# Patient Record
Sex: FEMALE | Race: WHITE | NOT HISPANIC OR LATINO | Employment: OTHER | URBAN - METROPOLITAN AREA
[De-identification: names, ages, dates, MRNs, and addresses within clinical notes are randomized per-mention and may not be internally consistent; named-entity substitution may affect disease eponyms.]

---

## 2017-05-09 ENCOUNTER — TRANSFERRED RECORDS (OUTPATIENT)
Dept: HEALTH INFORMATION MANAGEMENT | Facility: CLINIC | Age: 82
End: 2017-05-09

## 2017-06-06 ENCOUNTER — APPOINTMENT (OUTPATIENT)
Dept: OPHTHALMOLOGY | Facility: CLINIC | Age: 82
End: 2017-06-06
Attending: OPHTHALMOLOGY
Payer: MEDICARE

## 2017-06-06 DIAGNOSIS — H40.003 GLAUCOMA SUSPECT, BILATERAL: ICD-10-CM

## 2017-06-06 DIAGNOSIS — Z96.1 PSEUDOPHAKIA OF BOTH EYES: ICD-10-CM

## 2017-06-06 DIAGNOSIS — H40.1133 PRIMARY OPEN ANGLE GLAUCOMA OF BOTH EYES, SEVERE STAGE: Primary | ICD-10-CM

## 2017-06-06 PROCEDURE — 92020 GONIOSCOPY: CPT | Mod: ZF | Performed by: OPHTHALMOLOGY

## 2017-06-06 PROCEDURE — 92083 EXTENDED VISUAL FIELD XM: CPT | Mod: ZF | Performed by: OPHTHALMOLOGY

## 2017-06-06 PROCEDURE — 92133 CPTRZD OPH DX IMG PST SGM ON: CPT | Mod: ZF | Performed by: OPHTHALMOLOGY

## 2017-06-06 PROCEDURE — 99215 OFFICE O/P EST HI 40 MIN: CPT | Mod: ZF

## 2017-06-06 RX ORDER — DORZOLAMIDE HCL 20 MG/ML
SOLUTION/ DROPS OPHTHALMIC
Refills: 10 | COMMUNITY
Start: 2017-04-13 | End: 2020-01-06

## 2017-06-06 RX ORDER — LISINOPRIL 10 MG/1
10 TABLET ORAL
COMMUNITY
Start: 2016-08-15 | End: 2020-01-06

## 2017-06-06 RX ORDER — METOPROLOL SUCCINATE 25 MG/1
25 TABLET, EXTENDED RELEASE ORAL DAILY
COMMUNITY
Start: 2016-08-15 | End: 2020-03-23

## 2017-06-06 RX ORDER — BRINZOLAMIDE 10 MG/ML
SUSPENSION/ DROPS OPHTHALMIC
Refills: 2 | COMMUNITY
Start: 2017-03-13 | End: 2017-10-17

## 2017-06-06 RX ORDER — TRAZODONE HYDROCHLORIDE 50 MG/1
150 TABLET, FILM COATED ORAL AT BEDTIME
COMMUNITY
Start: 2017-02-21 | End: 2020-02-26

## 2017-06-06 RX ORDER — TRAVOPROST OPHTHALMIC SOLUTION 0.04 MG/ML
1 SOLUTION OPHTHALMIC AT BEDTIME
COMMUNITY
Start: 2016-04-12 | End: 2020-04-21

## 2017-06-06 RX ORDER — ALENDRONATE SODIUM 70 MG/1
70 TABLET ORAL
COMMUNITY
Start: 2017-04-13 | End: 2020-02-11

## 2017-06-06 RX ORDER — LEVOTHYROXINE SODIUM 88 UG/1
88 TABLET ORAL
COMMUNITY
Start: 2017-05-18 | End: 2017-06-06

## 2017-06-06 ASSESSMENT — PACHYMETRY
OS_CT(UM): 495
OD_CT(UM): 519

## 2017-06-06 ASSESSMENT — VISUAL ACUITY
OS_SC: 20/40-2
OD_PH_SC: 20/40
OD_SC: 20/70-1
METHOD: SNELLEN - LINEAR

## 2017-06-06 ASSESSMENT — GONIOSCOPY
OS_TEMPORAL: 4
OS_INFERIOR: 4
OD_TEMPORAL: 4
OS_NASAL: 4
ADDITIONAL_COMMENTS: 1-2+ TMP OU
OD_INFERIOR: 4
OS_SUPERIOR: 4
OD_NASAL: 4
OD_SUPERIOR: 4

## 2017-06-06 ASSESSMENT — SLIT LAMP EXAM - LIDS
COMMENTS: NORMAL
COMMENTS: NORMAL

## 2017-06-06 ASSESSMENT — TONOMETRY
IOP_METHOD: APPLANATION
OD_IOP_MMHG: 12
OS_IOP_MMHG: 19
OD_IOP_MMHG: 15
IOP_METHOD: APPLANATION
OS_IOP_MMHG: 22

## 2017-06-06 ASSESSMENT — REFRACTION_MANIFEST
OD_AXIS: 015
OD_ADD: +2.75
OS_AXIS: 015
OS_ADD: +2.75
OD_SPHERE: -1.50
OS_CYLINDER: +0.25
OD_CYLINDER: +0.75
OS_SPHERE: -0.50

## 2017-06-06 ASSESSMENT — CONF VISUAL FIELD
OD_INFERIOR_NASAL_RESTRICTION: 3
OS_INFERIOR_TEMPORAL_RESTRICTION: 3
OS_INFERIOR_NASAL_RESTRICTION: 1
OS_SUPERIOR_TEMPORAL_RESTRICTION: 3
OS_SUPERIOR_NASAL_RESTRICTION: 1
OD_SUPERIOR_TEMPORAL_RESTRICTION: 3
OD_INFERIOR_TEMPORAL_RESTRICTION: 3
OD_SUPERIOR_NASAL_RESTRICTION: 3

## 2017-06-06 ASSESSMENT — EXTERNAL EXAM - RIGHT EYE: OD_EXAM: NORMAL

## 2017-06-06 ASSESSMENT — CUP TO DISC RATIO
OD_RATIO: 0.6
OS_RATIO: 0.9

## 2017-06-06 ASSESSMENT — EXTERNAL EXAM - LEFT EYE: OS_EXAM: NORMAL

## 2017-06-06 NOTE — LETTER
June 6, 2017      Martín Jones MD  [facility]  [address] [place] [address]       RE: Annabel May  [address]  [facility] [address]       Dear [place]:     Thank you for referring your patient, Annabel May, to the EYE CLINIC at [facility]. Please see a copy of my visit note below.    Assessment:  1)POAG/?LTG OS>>OD -- ?H/O Hypotension and Bradycardia per son  K pachy: 519/495    Tmax: teens/24  HVF: OD:?Sup arcuate and OS:Sup hemifield with inferior arcuate and dec MD ?[place]  CDR: 0.6/0.9  HRT/OCT: OD:Mod-Severe RNFl thinning and OS:Severe RNFL thinning    FHX of Glc: No  Gonio: open  Intolerant to: ?Cosopt -- drop was d/c 2/2 irritation per son (?T1/2 as patient is tolerating Azopt)  Asthma/COPD: No, on po BB  Steroid Use: No  Kidney Stones: No    Sulfa Allergy: No  IOP targets: -- IOP above target OS   2)PCIOL OU -- following with Dr. Jones  3)H/O Afib on Eliquis s/p Pacemaker -- Brother (Kelvin De La Torre) who is a radiologist is POA -- [phone]    MD:Assisted living facility administers drops                        Plan:   Will obtain old notes and visual field tests.  Patient will return to clinic in 2-3 months with repeat visual field test (OS:LVC only) and IOP check.  Patient will continue on Travatan which is a teal top drop at bedtime in both eyes.  Patient will discontinue on Azopt (brinzolamide) which is an orange top drop and start Simbrinza (Brinzolamide/Brimonidine) which is a white top drop 3x/day (8 hours apart) in both eyes.  Pending results and review of old notes consideration will be given to possible surgery in the left eye.      Again, thank you for allowing me to participate in the care of your patient.        Sincerely,      Gisele Castro MD

## 2017-06-06 NOTE — NURSING NOTE
Chief Complaints and History of Present Illnesses   Patient presents with     Glaucoma Evaluation     HPI    Affected eye(s):  Left   Symptoms:     Blurred vision   Decreased vision   Dryness         Do you have eye pain now?:  No      Comments:  LE VA slowly getting worse. Pt has with azopt, dorzolamide and travatan   Beverly PANDYA June 6, 2017 9:32 AM

## 2017-06-06 NOTE — MR AVS SNAPSHOT
After Visit Summary   6/6/2017    Annabel May    MRN: [id number]           Patient Information     Date Of Birth          5/12/1934        Visit Information        Provider Department      6/6/2017 9:00 AM Gisele Castro MD Eye Clinic        Today's Diagnoses     Glaucoma suspect, bilateral    -  1      Care Instructions    Will obtain old notes and visual field tests.  Patient will return to clinic in 2-3 months with repeat visual field test (OS:LVC only) and IOP check.  Patient will continue on Travatan which is a teal top drop at bedtime in both eyes.  Patient will discontinue on Azopt (brinzolamide) which is an orange top drop and start Simbrinza (Brinzolamide/Brimonidine) which is a white top drop 3x/day (8 hours apart) in both eyes.            Follow-ups after your visit        Follow-up notes from your care team     Return 2-3 months with repeat visual field test (OS:LVC only) and IOP check.      Who to contact     Please call your clinic at [phone] to:    Ask questions about your health    Make or cancel appointments    Discuss your medicines    Learn about your test results    Speak to your doctor   If you have compliments or concerns about an experience at your clinic, or if you wish to file a complaint, please contact [facility] Patient Relations at [phone] or email us at [email or web address]         Additional Information About Your Visit        MyChart Information     DEMANDIT is an electronic gateway that provides easy, online access to your medical records. With DEMANDIT, you can request a clinic appointment, read your test results, renew a prescription or communicate with your care team.     To sign up for BTIGt visit the website at [email or web address]   You will be asked to enter the access code listed below, as well as some personal information. Please follow the directions to create your username and password.      Your access code is: [email or web address]  Expires: 2017  6:30 AM     Your access code will  in 90 days. If you need help or a new code, please contact your [facility] or call [phone] for assistance.        Care EveryWhere ID     This is your Care EveryWhere ID. This could be used by other organizations to access your [place] medical records  [id number]         Blood Pressure from Last 3 Encounters:   10/30/14 138/80   12 134/85   12 109/77    Weight from Last 3 Encounters:   10/30/14 59.9 kg (132 lb)   12 64.4 kg (142 lb)   12 65.8 kg (145 lb)              We Performed the Following     OCT Optic Nerve RNFL Spectralis OU (both eyes)     OVF 24-2 Dynamic OU          Today's Medication Changes          These changes are accurate as of: 17 11:35 AM.  If you have any questions, ask your nurse or doctor.               These medicines have changed or have updated prescriptions.        Dose/Directions    levothyroxine 100 MCG tablet   Commonly known as:  SYNTHROID   Indication:  Underactive Thyroid   This may have changed:  Another medication with the same name was removed. Continue taking this medication, and follow the directions you see here.   Used for:  Hypothyroidism   Changed by:  Mynor Alvarez MD        Dose:  100 mcg   Take 1 tablet by mouth daily. Indications: Underactive Thyroid   Quantity:  90 tablet   Refills:  3                Primary Care Provider Office Phone # Fax #    Mynor Alvarez [phone]       [address] [address]        Thank you!     Thank you for choosing EYE CLINIC  for your care. Our goal is always to provide you with excellent care. Hearing back from our patients is one way we can continue to improve our services. Please take a few minutes to complete the written survey that you may receive in the mail after your visit with us. Thank you!             Your  Updated Medication List - Protect others around you: Learn how to safely use, store and throw away your medicines at www.disposemymeds.org.          This list is accurate as of: 6/6/17 11:35 AM.  Always use your most recent med list.                   Brand Name Dispense Instructions for use    alendronate 70 MG tablet    FOSAMAX     Take 70 mg by mouth       apixaban ANTICOAGULANT 2.5 MG tablet    ELIQUIS     Take 2.5 mg by mouth       aspirin 325 MG tablet      Take 325 mg by mouth daily.       AZOPT 1 % ophthalmic susp   Generic drug:  brinzolamide      INSTILL 1 GTT IN LEFT EAR BID       butalbital-aspirin-caffeine -40 MG per capsule    FIORINAL     Take 1 capsule by mouth 3 times daily as needed.       dorzolamide 2 % ophthalmic solution    TRUSOPT     INT ONE GTT KELLEE BID       levothyroxine 100 MCG tablet    SYNTHROID    90 tablet    Take 1 tablet by mouth daily. Indications: Underactive Thyroid       lisinopril 10 MG tablet    PRINIVIL/ZESTRIL     Take 10 mg by mouth       metoprolol 25 MG 24 hr tablet    TOPROL-XL     Take 25 mg by mouth       TRAVATAN Z 0.004 % ophthalmic solution   Generic drug:  travoprost (BAK Free)      1 drop       traZODone 50 MG tablet    DESYREL     Take 50 mg by mouth

## 2017-06-06 NOTE — PROGRESS NOTES
1)POAG/?LTG OS>>OD -- ?H/O Hypotension and Bradycardia per son -- K pachy:519/495    Tmax: teens/24    HVF: OD:?Sup arcuate and OS:Sup hemifield with inferior arcuate and dec MD ?[place]      CDR:0.6/0.9     HRT/OCT: OD:Mod-Severe RNFl thinning and OS:Severe RNFL thinning       FHX of Glc: No     Gonio: open        Intolerant to: ?Cosopt -- drop was d/c 2/2 irritation per son (?T1/2 as patient is tolerating Azopt)    Asthma/COPD:No, on po BB   Steroid Use: No     Kidney Stones: No     Sulfa Allergy: No     IOP targets: -- IOP above target OS   2)PCIOL OU -- following with Dr. Jones  3)H/O Afib on Eliquis s/p Pacemaker -- Brother (Kelvin De La Torre) who is a radiologist is POA -- [phone]    MD:Assisted living facility administers drops    Will obtain old notes and visual field tests.  Patient will return to clinic in 2-3 months with repeat visual field test (OS:LVC only) and IOP check.  Patient will continue on Travatan which is a teal top drop at bedtime in both eyes.  Patient will discontinue on Azopt (brinzolamide) which is an orange top drop and start Simbrinza (Brinzolamide/Brimonidine) which is a white top drop 3x/day (8 hours apart) in both eyes.  Pending results and review of old notes consideration will be given to possible surgery in the left eye.      Attending Physician Attestation:  Complete documentation of historical and exam elements from today's encounter can be found in the full encounter summary report (not reduplicated in this progress note). I personally obtained the chief complaint(s) and history of present illness.  I confirmed and edited as necessary the review of systems, past medical/surgical history, family history, social history, and examination findings as documented by others; and I examined the patient myself. I personally reviewed the relevant tests, images, and reports as documented above. I formulated and edited as necessary the assessment and plan and discussed the findings  and management plan with the patient and family.  - Gisele Castro MD

## 2017-06-06 NOTE — PATIENT INSTRUCTIONS
Will obtain old notes and visual field tests.  Patient will return to clinic in 2-3 months with repeat visual field test (OS:LVC only) and IOP check.  Patient will continue on Travatan which is a teal top drop at bedtime in both eyes.  Patient will discontinue on Azopt (brinzolamide) which is an orange top drop and start Simbrinza (Brinzolamide/Brimonidine) which is a white top drop 3x/day (8 hours apart) in both eyes.

## 2017-06-15 ENCOUNTER — TELEPHONE (OUTPATIENT)
Dept: PSYCHIATRY | Facility: CLINIC | Age: 82
End: 2017-06-15

## 2017-06-15 DIAGNOSIS — H40.1130 PRIMARY OPEN ANGLE GLAUCOMA OF BOTH EYES, UNSPECIFIED GLAUCOMA STAGE: Primary | ICD-10-CM

## 2017-06-15 NOTE — TELEPHONE ENCOUNTER
Call from patients care giver concerned that the Rx for Simbrinza (Brinzolamide/Brimonidine) was sent to the wrong pharmacy. Medication list reviewed and there is no order for this medication. Chart note from 6-6-17 states: start Simbrinza (Brinzolamide/Brimonidine) which is a white top drop 3x/day (8 hours apart) in both eyes.  However the note is unsigned. The medication will pended and routed to the provider refill team for review and authorization. Once signed it will go to the requested pharmacy.      Kathleen M Doege RN

## 2017-08-24 ENCOUNTER — APPOINTMENT (OUTPATIENT)
Dept: OPHTHALMOLOGY | Facility: CLINIC | Age: 82
End: 2017-08-24
Attending: OPHTHALMOLOGY
Payer: MEDICARE

## 2017-08-24 DIAGNOSIS — Z96.1 PSEUDOPHAKIA OF BOTH EYES: ICD-10-CM

## 2017-08-24 DIAGNOSIS — H40.1133 PRIMARY OPEN ANGLE GLAUCOMA OF BOTH EYES, SEVERE STAGE: Primary | ICD-10-CM

## 2017-08-24 PROCEDURE — 92083 EXTENDED VISUAL FIELD XM: CPT | Mod: ZF | Performed by: OPHTHALMOLOGY

## 2017-08-24 PROCEDURE — 99212 OFFICE O/P EST SF 10 MIN: CPT | Mod: 25,ZF

## 2017-08-24 ASSESSMENT — VISUAL ACUITY
METHOD: SNELLEN - LINEAR
OS_CC: 20/25
OD_CC: 20/20
OD_CC+: -2
CORRECTION_TYPE: GLASSES
OS_CC+: -2

## 2017-08-24 ASSESSMENT — EXTERNAL EXAM - RIGHT EYE: OD_EXAM: NORMAL

## 2017-08-24 ASSESSMENT — SLIT LAMP EXAM - LIDS
COMMENTS: NORMAL
COMMENTS: NORMAL

## 2017-08-24 ASSESSMENT — CUP TO DISC RATIO
OD_RATIO: 0.6
OS_RATIO: 0.9

## 2017-08-24 ASSESSMENT — CONF VISUAL FIELD
OD_NORMAL: 1
OS_SUPERIOR_TEMPORAL_RESTRICTION: 3
OS_INFERIOR_TEMPORAL_RESTRICTION: 3
OS_INFERIOR_NASAL_RESTRICTION: 1
OS_SUPERIOR_NASAL_RESTRICTION: 3

## 2017-08-24 ASSESSMENT — TONOMETRY
OS_IOP_MMHG: 29
OD_IOP_MMHG: 15
IOP_METHOD: APPLANATION

## 2017-08-24 ASSESSMENT — REFRACTION_WEARINGRX
OD_CYLINDER: +0.75
OD_SPHERE: -1.50
OS_ADD: +2.75
OS_SPHERE: -0.50
OS_AXIS: 015
OD_ADD: +2.75
OD_AXIS: 015
OS_CYLINDER: +0.25

## 2017-08-24 ASSESSMENT — EXTERNAL EXAM - LEFT EYE: OS_EXAM: NORMAL

## 2017-08-24 NOTE — MR AVS SNAPSHOT
After Visit Summary   8/24/2017    Annabel May    MRN: [id number]           Patient Information     Date Of Birth          5/12/1934        Visit Information        Provider Department      8/24/2017 3:00 PM Gisele Castro MD Eye Clinic        Today's Diagnoses     Primary open angle glaucoma of both eyes, severe stage    -  1    Pseudophakia of both eyes          Care Instructions    Will obtain old notes and visual field tests.  Patient will continue on Travatan which is a teal top drop at bedtime in BOTH EYES and Azopt (brinzolamide) which is an orange top drop 3x/day (8 hours apart) in BOTH EYES.  A long discussion of the risks, benefits, and alternatives including potential treatment and management options were had with patient and a decision was made to proceed with possible Trabeculectomy (glaucoma surgery) in the left eye.              Follow-ups after your visit        Who to contact     Please call your clinic at [phone] to:    Ask questions about your health    Make or cancel appointments    Discuss your medicines    Learn about your test results    Speak to your doctor   If you have compliments or concerns about an experience at your clinic, or if you wish to file a complaint, please contact [facility] Patient Relations at [phone] or email us at [email or web address]         Additional Information About Your Visit        MyChart Information     HexAirbot is an electronic gateway that provides easy, online access to your medical records. With HexAirbot, you can request a clinic appointment, read your test results, renew a prescription or communicate with your care team.     To sign up for ConnectSolutionst visit the website at [email or web address]   You will be asked to enter the access code listed below, as well as some personal information. Please follow the directions to create your username and password.     Your access code is:  [email or web address]  Expires: 2017  4:29 PM     Your access code will  in 90 days. If you need help or a new code, please contact your [facility] or call [phone] for assistance.        Care EveryWhere ID     This is your Care EveryWhere ID. This could be used by other organizations to access your [place] medical records  [id number]         Blood Pressure from Last 3 Encounters:   10/30/14 138/80   12 134/85   12 109/77    Weight from Last 3 Encounters:   10/30/14 59.9 kg (132 lb)   12 64.4 kg (142 lb)   12 65.8 kg (145 lb)              We Performed the Following     Low Vision Central OS        Primary Care Provider Office Phone # Fax #    Mynor Alvarez [phone]       [place] [address]        Equal Access to Services     ILIA [facility] CenterLIZETTE : Hadii aad ku hadasho Soomaali, waaxda luqadaha, qaybta kaalmada adeegyada, waxay idiin hayaan adeeg kharash la'jrn . So [facility] [phone].    ATENCIÓN: Si habla español, tiene a murillo disposición servicios gratuitos de asistencia lingüística. Llame al [phone].    We comply with applicable federal civil rights laws and [place] laws. We do not discriminate on the basis of race, color, national origin, age, disability sex, sexual orientation or gender identity.            Thank you!     Thank you for choosing EYE CLINIC  for your care. Our goal is always to provide you with excellent care. Hearing back from our patients is one way we can continue to improve our services. Please take a few minutes to complete the written survey that you may receive in the mail after your visit with us. Thank you!             Your Updated Medication List - Protect others around you: Learn how to safely use, store and throw away your medicines at www.disposemymeds.org.          This list is accurate as of: 17  4:30 PM.  Always use your most recent med list.                    Brand Name Dispense Instructions for use Diagnosis    alendronate 70 MG tablet    FOSAMAX     Take 70 mg by mouth        apixaban ANTICOAGULANT 2.5 MG tablet    ELIQUIS     Take 2.5 mg by mouth        aspirin 325 MG tablet      Take 325 mg by mouth daily.        AZOPT 1 % ophthalmic susp   Generic drug:  brinzolamide      INSTILL 1 GTT IN LEFT EAR BID        brinzolamide-brimonidine 1-0.2 % ophthalmic suspension    SIMBRINZA    8 mL    Place 1 drop into both eyes 3 times daily 8 hours apart    Primary open angle glaucoma of both eyes, unspecified glaucoma stage       butalbital-aspirin-caffeine -40 MG per capsule    FIORINAL     Take 1 capsule by mouth 3 times daily as needed.        dorzolamide 2 % ophthalmic solution    TRUSOPT     INT ONE GTT KELLEE BID        levothyroxine 100 MCG tablet    SYNTHROID    90 tablet    Take 1 tablet by mouth daily. Indications: Underactive Thyroid    Hypothyroidism       lisinopril 10 MG tablet    PRINIVIL/ZESTRIL     Take 10 mg by mouth        metoprolol 25 MG 24 hr tablet    TOPROL-XL     Take 25 mg by mouth        TRAVATAN Z 0.004 % ophthalmic solution   Generic drug:  travoprost (BAK Free)      1 drop        traZODone 50 MG tablet    DESYREL     Take 50 mg by mouth

## 2017-08-24 NOTE — NURSING NOTE
Chief Complaints and History of Present Illnesses   Patient presents with     Follow Up For     Primary open angle glaucoma of both eyes, severe stage     HPI    Affected eye(s):  Both   Symptoms:        Duration:  2 months   Frequency:  Constant       Do you have eye pain now?:  No      Comments:  Pt. States no change in VA BE.  No c/o comfort BE.  Roxy Rios COT 2:42 PM August 24, 2017

## 2017-08-24 NOTE — PATIENT INSTRUCTIONS
Will obtain old notes and visual field tests.  Patient will continue on Travatan which is a teal top drop at bedtime in BOTH EYES and Azopt (brinzolamide) which is an orange top drop 3x/day (8 hours apart) in BOTH EYES.  A long discussion of the risks, benefits, and alternatives including potential treatment and management options were had with patient and a decision was made to proceed with possible Trabeculectomy (glaucoma surgery) in the left eye.

## 2017-08-24 NOTE — PROGRESS NOTES
1)POAG/?LTG OS>>OD -- ?H/O Hypotension and Bradycardia per son -- K pachy:519/495    Tmax: teens/28    HVF: OD:?Sup arcuate and OS:Sup hemifield with inferior arcuate and dec MD ?[place]      CDR:0.6/0.9     HRT/OCT: OD:Mod-Severe RNFl thinning and OS:Severe RNFL thinning       FHX of Glc: No     Gonio: open        Intolerant to: ?Cosopt -- drop was d/c 2/2 irritation per son (?T1/2 as patient is tolerating Azopt), Simbrinza -- irritation, injection all day  (d/c 8/17)   Asthma/COPD:No, on po BB   Steroid Use: No     Kidney Stones: No     Sulfa Allergy: No     IOP targets: -- IOP above target OS -- rec Trab, mCPC, Tube OS  2)PCIOL OU -- following with Dr. Jones  3)H/O Afib on Eliquis s/p Pacemaker -- (Kelvin De La Torre) who is a radiologist is POA -- [phone], Caregiver (Lou) [phone]    MD:Assisted living facility administers drops    Will obtain old notes and visual field tests.  Patient will continue on Travatan which is a teal top drop at bedtime in BOTH EYES and Azopt (brinzolamide) which is an orange top drop 3x/day (8 hours apart) in BOTH EYES.  A long discussion of the risks, benefits, and alternatives including potential treatment and management options were had with patient and a decision was made to proceed with possible Trabeculectomy (glaucoma surgery) in the left eye after discussing with her son.      8/19/17: Phone dIscussion with Kelvin (POA) -- would proceed with tube given follow up issues and declining misha.  WIll have aptient scheduled for tube surgery      Attending Physician Attestation:  Complete documentation of historical and exam elements from today's encounter can be found in the full encounter summary report (not reduplicated in this progress note). I personally obtained the chief complaint(s) and history of present illness.  I confirmed and edited as necessary the review of systems, past medical/surgical history, family history, social history, and examination findings as  documented by others; and I examined the patient myself. I personally reviewed the relevant tests, images, and reports as documented above. I formulated and edited as necessary the assessment and plan and discussed the findings and management plan with the patient and family.  - Gisele Castro MD

## 2017-09-01 ENCOUNTER — TELEPHONE (OUTPATIENT)
Dept: OPHTHALMOLOGY | Facility: CLINIC | Age: 82
End: 2017-09-01

## 2017-09-18 ENCOUNTER — TELEPHONE (OUTPATIENT)
Dept: OPHTHALMOLOGY | Facility: CLINIC | Age: 82
End: 2017-09-18

## 2017-10-12 ENCOUNTER — TRANSFERRED RECORDS (OUTPATIENT)
Dept: HEALTH INFORMATION MANAGEMENT | Facility: CLINIC | Age: 82
End: 2017-10-12

## 2017-10-16 ENCOUNTER — TRANSFERRED RECORDS (OUTPATIENT)
Dept: HEALTH INFORMATION MANAGEMENT | Facility: CLINIC | Age: 82
End: 2017-10-16

## 2017-10-17 ENCOUNTER — OFFICE VISIT (OUTPATIENT)
Dept: OPHTHALMOLOGY | Facility: CLINIC | Age: 82
End: 2017-10-17
Attending: OPHTHALMOLOGY
Payer: MEDICARE

## 2017-10-17 DIAGNOSIS — H40.1133 PRIMARY OPEN ANGLE GLAUCOMA OF BOTH EYES, SEVERE STAGE: Primary | ICD-10-CM

## 2017-10-17 DIAGNOSIS — Z96.1 PSEUDOPHAKIA OF BOTH EYES: ICD-10-CM

## 2017-10-17 PROCEDURE — 99213 OFFICE O/P EST LOW 20 MIN: CPT | Mod: ZF

## 2017-10-17 RX ORDER — BRINZOLAMIDE 10 MG/ML
1 SUSPENSION/ DROPS OPHTHALMIC 2 TIMES DAILY
COMMUNITY
End: 2020-04-29

## 2017-10-17 RX ORDER — CIPROFLOXACIN HYDROCHLORIDE 3.5 MG/ML
1 SOLUTION/ DROPS TOPICAL 4 TIMES DAILY
Qty: 1 BOTTLE | Refills: 0 | Status: SHIPPED | OUTPATIENT
Start: 2017-10-17 | End: 2020-01-06

## 2017-10-17 RX ORDER — PREDNISOLONE ACETATE 10 MG/ML
1 SUSPENSION/ DROPS OPHTHALMIC
Qty: 15 ML | Refills: 3 | Status: SHIPPED | OUTPATIENT
Start: 2017-10-17 | End: 2020-01-06

## 2017-10-17 ASSESSMENT — EXTERNAL EXAM - RIGHT EYE: OD_EXAM: NORMAL

## 2017-10-17 ASSESSMENT — REFRACTION_WEARINGRX
OS_AXIS: 015
OS_SPHERE: -0.50
OD_CYLINDER: +0.75
OD_ADD: +2.75
OD_SPHERE: -1.50
OS_CYLINDER: +0.25
OD_AXIS: 015
OS_ADD: +2.75

## 2017-10-17 ASSESSMENT — SLIT LAMP EXAM - LIDS
COMMENTS: NORMAL
COMMENTS: NORMAL

## 2017-10-17 ASSESSMENT — VISUAL ACUITY
OD_CC: 20/40
METHOD: SNELLEN - LINEAR
OS_SC: 20/125
OD_CC+: -2

## 2017-10-17 ASSESSMENT — TONOMETRY
IOP_METHOD: TONOPEN
OS_IOP_MMHG: 09
OD_IOP_MMHG: 14

## 2017-10-17 ASSESSMENT — EXTERNAL EXAM - LEFT EYE: OS_EXAM: NORMAL

## 2017-10-17 NOTE — NURSING NOTE
Chief Complaints and History of Present Illnesses   Patient presents with     Post Op (Ophthalmology) Left Eye     HPI    Symptoms:           Do you have eye pain now?:  Yes   Location:  OS   Pain Level:  Moderate Pain (4)   Pain Frequency:  Constant      Comments:  One day POP left eye.    MUMTAZ Larsen 8:01 AM 10/17/2017

## 2017-10-17 NOTE — PATIENT INSTRUCTIONS
Patient will continue on Travatan which is a teal top drop at bedtime in the RIGHT EYE ONLY and Azopt (brinzolamide) which is an orange top drop 3x/day (8 hours apart) in the RIGHT EYE ONLY.      No heavy lifting, bending, stooping, straining, rubbing the eye, or getting water in the eye.  Please wear protective eyewear over the eye at all times including glasses during the day and the protective shield at bedtime.  Patient will return to clinic in 1 week with repeat evaluation.    Use the following drops (LEFT EYE ONLY):  Antibiotic --  Ciprofloxacin: 4x/day until finished (use for at least 5-7 days after surgery)    Steroid -- Pred Forte/Prednisolone Acetate: every 2 hours while awake    Please be sure to call if you have any decrease in vision, increase in pain, flashing lights, redness, or a lot of drainage.

## 2017-10-17 NOTE — MR AVS SNAPSHOT
After Visit Summary   10/17/2017    Annabel May    MRN: [id number]           Patient Information     Date Of Birth          5/12/1934        Visit Information        Provider Department      10/17/2017 7:45 AM Gisele Castro MD [facility]        Today's Diagnoses     Primary open angle glaucoma of both eyes, severe stage    -  1    Pseudophakia of both eyes          Care Instructions    Patient will continue on Travatan which is a teal top drop at bedtime in the RIGHT EYE ONLY and Azopt (brinzolamide) which is an orange top drop 3x/day (8 hours apart) in the RIGHT EYE ONLY.      No heavy lifting, bending, stooping, straining, rubbing the eye, or getting water in the eye.  Please wear protective eyewear over the eye at all times including glasses during the day and the protective shield at bedtime.  Patient will return to clinic in 1 week with repeat evaluation.    Use the following drops (LEFT EYE ONLY):  Antibiotic --  Ciprofloxacin: 4x/day until finished (use for at least 5-7 days after surgery)    Steroid -- Pred Forte/Prednisolone Acetate: every 2 hours while awake    Please be sure to call if you have any decrease in vision, increase in pain, flashing lights, redness, or a lot of drainage.              Follow-ups after your visit        Your next 10 appointments already scheduled     Oct 24, 2017  2:45 PM CDT   Post-Op with Gisele Castro MD   Eye Clinic ([facility])    [facility]  [address]  [address]  [facility] [facility] [address]   [phone]              Who to contact     Please call your clinic at [phone] to:    Ask questions about your health    Make or cancel appointments    Discuss your medicines    Learn about your test results    Speak to your doctor   If you have compliments or concerns about an experience at your clinic, or if you wish to file a complaint, please contact [facility] Patient Relations at  [phone] or email us at [email or web address]         Additional Information About Your Visit        WorkHound Information     WorkHound is an electronic gateway that provides easy, online access to your medical records. With WorkHound, you can request a clinic appointment, read your test results, renew a prescription or communicate with your care team.     To sign up for WorkHound visit the website at [email or web address]   You will be asked to enter the access code listed below, as well as some personal information. Please follow the directions to create your username and password.     Your access code is: [email or web address]  Expires: 2017  4:29 PM     Your access code will  in 90 days. If you need help or a new code, please contact your [facility] or call [phone] for assistance.        Care EveryWhere ID     This is your Care EveryWhere ID. This could be used by other organizations to access your [facility] medical records  [id number]         Blood Pressure from Last 3 Encounters:   10/30/14 138/80   12 134/85   12 109/77    Weight from Last 3 Encounters:   10/30/14 59.9 kg (132 lb)   12 64.4 kg (142 lb)   12 65.8 kg (145 lb)              Today, you had the following     No orders found for display         Today's Medication Changes          These changes are accurate as of: 10/17/17  8:33 AM.  If you have any questions, ask your nurse or doctor.               Start taking these medicines.        Dose/Directions    ciprofloxacin 0.3 % ophthalmic solution   Commonly known as:  CILOXAN   Used for:  Primary open angle glaucoma of both eyes, severe stage   Started by:  Gisele Castro MD        Dose:  1 drop   Place 1 drop Into the left eye 4 times daily   Quantity:  1 Bottle   Refills:  0       prednisoLONE acetate 1 % ophthalmic susp   Commonly known as:  PRED FORTE   Used for:  Primary open angle glaucoma of both eyes, severe  stage   Started by:  Gisele Castro MD        Dose:  1 drop   Place 1 drop Into the left eye every 2 hours   Quantity:  15 mL   Refills:  3         These medicines have changed or have updated prescriptions.        Dose/Directions    brinzolamide 1 % ophthalmic susp   Commonly known as:  AZOPT   This may have changed:  Another medication with the same name was removed. Continue taking this medication, and follow the directions you see here.   Changed by:  Gisele Castro MD        Dose:  1 drop   Place 1 drop into the right eye 3 times daily   Refills:  0            Where to get your medicines      These medications were sent to [facility] [address] [facility], [place] - [address] [facility] [address] [place] [address]     Phone:  [phone]     ciprofloxacin 0.3 % ophthalmic solution    prednisoLONE acetate 1 % ophthalmic susp                Primary Care Provider Office Phone # Fax #    Mynor Alvarez [phone]       [address] [address]        Equal Access to Services     [facility]: Hadii aad ku hadasho Soomaali, waaxda luqadaha, qaybta kaalmada adeegyada, waxashtyn russ . So [facility] [phone].    ATENCIÓN: Si habla español, tiene a murillo disposición servicios gratmichaelaos de asistencia lingüística. [facility] [phone].    We comply with applicable federal civil rights laws and [place] laws. We do not discriminate on the basis of race, color, national origin, age, disability, sex, sexual orientation, or gender identity.            Thank you!     Thank you for choosing EYE CLINIC  for your care. Our goal is always to provide you with excellent care. Hearing back from our patients is one way we can continue to improve our services. Please take a few minutes to complete the written survey that you may receive in the mail after your visit with us. Thank you!             Your  Updated Medication List - Protect others around you: Learn how to safely use, store and throw away your medicines at www.disposemymeds.org.          This list is accurate as of: 10/17/17  8:33 AM.  Always use your most recent med list.                   Brand Name Dispense Instructions for use Diagnosis    alendronate 70 MG tablet    FOSAMAX     Take 70 mg by mouth        apixaban ANTICOAGULANT 2.5 MG tablet    ELIQUIS     Take 2.5 mg by mouth        aspirin 325 MG tablet      Take 325 mg by mouth daily.        brinzolamide 1 % ophthalmic susp    AZOPT     Place 1 drop into the right eye 3 times daily        brinzolamide-brimonidine 1-0.2 % ophthalmic suspension    SIMBRINZA    8 mL    Place 1 drop into both eyes 3 times daily 8 hours apart    Primary open angle glaucoma of both eyes, unspecified glaucoma stage       butalbital-aspirin-caffeine -40 MG per capsule    FIORINAL     Take 1 capsule by mouth 3 times daily as needed.        ciprofloxacin 0.3 % ophthalmic solution    CILOXAN    1 Bottle    Place 1 drop Into the left eye 4 times daily    Primary open angle glaucoma of both eyes, severe stage       dorzolamide 2 % ophthalmic solution    TRUSOPT     INT ONE GTT KELLEE BID        levothyroxine 100 MCG tablet    SYNTHROID    90 tablet    Take 1 tablet by mouth daily. Indications: Underactive Thyroid    Hypothyroidism       lisinopril 10 MG tablet    PRINIVIL/ZESTRIL     Take 10 mg by mouth        metoprolol 25 MG 24 hr tablet    TOPROL-XL     Take 25 mg by mouth        prednisoLONE acetate 1 % ophthalmic susp    PRED FORTE    15 mL    Place 1 drop Into the left eye every 2 hours    Primary open angle glaucoma of both eyes, severe stage       TRAVATAN Z 0.004 % ophthalmic solution   Generic drug:  travoprost (BAK Free)      Place 1 drop into the right eye At Bedtime        traZODone 50 MG tablet    DESYREL     Take 50 mg by mouth

## 2017-10-17 NOTE — PROGRESS NOTES
1)POAG/?LTG OS>>OD -- s/p Tube OS (10/16/17), ?H/O Hypotension and Bradycardia per son -- K pachy:519/495    Tmax: teens/28    HVF: OD:?Sup arcuate and OS:Sup hemifield with inferior arcuate and dec MD ?[place]      CDR:0.6/0.9     HRT/OCT: OD:Mod-Severe RNFl thinning and OS:Severe RNFL thinning       FHX of Glc: No     Gonio: open        Intolerant to: ?Cosopt -- drop was d/c 2/2 irritation per son (?T1/2 as patient is tolerating Azopt), Simbrinza -- irritation, injection all day  (d/c 8/17)   Asthma/COPD:No, on po BB   Steroid Use: No     Kidney Stones: No     Sulfa Allergy: No     IOP targets: -- IOP improved -- rec Trab, mCPC, Tube OS  2)PCIOL OU -- following with Dr. Jones  3)H/O Afib on Eliquis s/p Pacemaker -- (Kelvin De La Torre) who is a radiologist is POA -- [phone], Caregiver (Lou) [phone]    MD:Assisted living facility administers drops    8/19/17: Phone dIscussion with Kelvin (LETHA) -- would proceed with tube given follow up issues and declining misha.      Will obtain old notes and visual field tests.  Patient will continue on Travatan which is a teal top drop at bedtime in the RIGHT EYE ONLY and Azopt (brinzolamide) which is an orange top drop 3x/day (8 hours apart) in the RIGHT EYE ONLY.      No heavy lifting, bending, stooping, straining, rubbing the eye, or getting water in the eye.  Please wear protective eyewear over the eye at all times including glasses during the day and the protective shield at bedtime.  Patient will return to clinic in 1 week with repeat evaluation.    Use the following drops (LEFT EYE ONLY):  Antibiotic --  Ciprofloxacin: 4x/day until finished (use for at least 5-7 days after surgery)    Steroid -- Pred Forte/Prednisolone Acetate: every 2 hours while awake    Please be sure to call if you have any decrease in vision, increase in pain, flashing lights, redness, or a lot of drainage.          Attending Physician Attestation:  Complete documentation of historical  and exam elements from today's encounter can be found in the full encounter summary report (not reduplicated in this progress note). I personally obtained the chief complaint(s) and history of present illness.  I confirmed and edited as necessary the review of systems, past medical/surgical history, family history, social history, and examination findings as documented by others; and I examined the patient myself. I personally reviewed the relevant tests, images, and reports as documented above. I formulated and edited as necessary the assessment and plan and discussed the findings and management plan with the patient and family.  - Gisele Castro MD

## 2017-10-18 ENCOUNTER — TELEPHONE (OUTPATIENT)
Dept: OPHTHALMOLOGY | Facility: CLINIC | Age: 82
End: 2017-10-18

## 2017-10-18 NOTE — TELEPHONE ENCOUNTER
Daughter with pt today helping with care    Pt last seen yesterday for one day post-op glaucoma tube  Pt/daughter stated mattering in post-op eye during day yesterday and today-- yellowish  Started using warm cloth to clean and today improvement on yellow discharge and able to open eye more today than yesterday    Pt had complaint of eye pain after eye drop administered.  Reviewed may us preservative free artificial tear about 5 minutes before administering drops to see if helps    No complaints of vision changes  H/o dementia per daughter    Pt may use cool compresses also for comfort  Pt to monitor for any worsening eye pain, complaints of vision changes  Reviewed weekend on call protocols and after hours  Daughter comfortable with plan    Note to glaucoma resident for review and amendment if applicable  Fran Thomas RN 3:27 PM 10/18/17

## 2017-10-18 NOTE — TELEPHONE ENCOUNTER
----- Message from Frederick Grant sent at 10/18/2017  2:30 PM CDT -----  Regarding: Questions about Post Op Symptoms - Pt of Dr Castro  Contact: [phone]  Pt's daughter/care giver, Loli said mom had procedure done Mon (10/16) to take pressure off eye ball  On Tuesday, Pt had goopy (glue-like) eyes where the eye lashes were sticking to each other and on Wednesday, it felt like glued shut.  With warm water and constant wiping it is now starting to open. Pt's daughter says it's becoming painful and is still sticky. It's black and blue, no fever, no drainage.    Pt's daughter is wondering if the goop is from the drops.     Please call Pt's daughter back at [phone].    Thank you!  CH  Please DO NOT send this message and/or reply back to sender. Call Center Representatives DO NOT respond to messages.

## 2017-10-24 ENCOUNTER — OFFICE VISIT (OUTPATIENT)
Dept: OPHTHALMOLOGY | Facility: CLINIC | Age: 82
End: 2017-10-24
Attending: OPHTHALMOLOGY
Payer: MEDICARE

## 2017-10-24 DIAGNOSIS — H40.1133 PRIMARY OPEN ANGLE GLAUCOMA OF BOTH EYES, SEVERE STAGE: Primary | ICD-10-CM

## 2017-10-24 PROCEDURE — 99212 OFFICE O/P EST SF 10 MIN: CPT | Mod: ZF

## 2017-10-24 ASSESSMENT — VISUAL ACUITY
OS_SC: 20/30
OS_SC+: -2
METHOD: SNELLEN - LINEAR
OD_SC: 20/70
OD_PH_SC: 20/40

## 2017-10-24 ASSESSMENT — TONOMETRY
OD_IOP_MMHG: 12
OS_IOP_MMHG: 10
IOP_METHOD: APPLANATION

## 2017-10-24 ASSESSMENT — EXTERNAL EXAM - RIGHT EYE: OD_EXAM: NORMAL

## 2017-10-24 ASSESSMENT — SLIT LAMP EXAM - LIDS: COMMENTS: NORMAL

## 2017-10-24 ASSESSMENT — EXTERNAL EXAM - LEFT EYE: OS_EXAM: NORMAL

## 2017-10-24 NOTE — PATIENT INSTRUCTIONS
Will obtain old notes and visual field tests.  Patient will continue on Travatan which is a teal top drop at bedtime in the RIGHT EYE ONLY and Azopt (brinzolamide) which is an orange top drop 3x/day (8 hours apart) in the RIGHT EYE ONLY.      No heavy lifting, bending, stooping, straining, rubbing the eye, or getting water in the eye.  Please wear protective eyewear over the eye at all times including glasses during the day and the protective shield at bedtime.  Patient will return to clinic in 3-4 weeks with repeat evaluation.    Use the following drops (LEFT EYE ONLY):  Antibiotic --  Ciprofloxacin: DIscontinue    Steroid -- Pred Forte/Prednisolone Acetate: every 2 hours while awake for 1 week then 4x/day until seen    Please be sure to call if you have any decrease in vision, increase in pain, flashing lights, redness, or a lot of drainage.

## 2017-10-24 NOTE — NURSING NOTE
Chief Complaints and History of Present Illnesses   Patient presents with     Glaucoma Follow Up     1 week follow up      HPI    Affected eye(s):  Left   Symptoms:     No floaters   No flashes   Redness   No Dryness         Do you have eye pain now?:  No      Comments:  Pt uncertain if vision has changed since last visit. Pt notes LE is uncomfortable today.    Cassandra ZUNIGA October 24, 2017 2:55 PM

## 2017-10-24 NOTE — MR AVS SNAPSHOT
After Visit Summary   10/24/2017    Annabel May    MRN: [id number]           Patient Information     Date Of Birth          5/12/1934        Visit Information        Provider Department      10/24/2017 2:45 PM Gisele Castro MD [facility]        Today's Diagnoses     Primary open angle glaucoma of both eyes, severe stage    -  1      Care Instructions    Will obtain old notes and visual field tests.  Patient will continue on Travatan which is a teal top drop at bedtime in the RIGHT EYE ONLY and Azopt (brinzolamide) which is an orange top drop 3x/day (8 hours apart) in the RIGHT EYE ONLY.      No heavy lifting, bending, stooping, straining, rubbing the eye, or getting water in the eye.  Please wear protective eyewear over the eye at all times including glasses during the day and the protective shield at bedtime.  Patient will return to clinic in 3-4 weeks with repeat evaluation.    Use the following drops (LEFT EYE ONLY):  Antibiotic --  Ciprofloxacin: DIscontinue    Steroid -- Pred Forte/Prednisolone Acetate: every 2 hours while awake for 1 week then 4x/day until seen    Please be sure to call if you have any decrease in vision, increase in pain, flashing lights, redness, or a lot of drainage.            Follow-ups after your visit        Follow-up notes from your care team     Return 3-4 weeks with repeat evaluation..      Your next 10 appointments already scheduled     Nov 21, 2017  2:45 PM CST   RETURN GLAUCOMA with Gisele Castro MD   Eye Clinic ([facility])    [facility]  [address]  9[address]  [place] [address] [phone]              Who to contact     Please call your clinic at [phone] to:    Ask questions about your health    Make or cancel appointments    Discuss your medicines    Learn about your test results    Speak to your doctor   If you have compliments or concerns about an experience at your clinic, or if you wish to file a  complaint, please contact [facility] Patient Relations at [phone] or email us at [email or web address]         Additional Information About Your Visit        WhoochharProtectus Technologies Information     Canvita is an electronic gateway that provides easy, online access to your medical records. With Canvita, you can request a clinic appointment, read your test results, renew a prescription or communicate with your care team.     To sign up for Canvita visit the website at [email or web address]   You will be asked to enter the access code listed below, as well as some personal information. Please follow the directions to create your username and password.     Your access code is: [email or web address]  Expires: 2017  4:29 PM     Your access code will  in 90 days. If you need help or a new code, please contact your [facility] or call [phone] for assistance.        Care EveryWhere ID     This is your Care EveryWhere ID. This could be used by other organizations to access your [facility] medical records  [id number]         Blood Pressure from Last 3 Encounters:   10/30/14 138/80   12 134/85   12 109/77    Weight from Last 3 Encounters:   10/30/14 59.9 kg (132 lb)   12 64.4 kg (142 lb)   12 65.8 kg (145 lb)              Today, you had the following     No orders found for display       Primary Care Provider Office Phone # Fax #    Mynor Alvarez [phone]       [place] [address]        Equal Access to Services     [facility]: Hadii imchi ku hadasho Sogreyson, waaxda luqadaha, qaybta kaalmada randalyaizzy, zeinab parson. So [facility] [phone].    ATENCIÓN: Si habla español, tiene a murillo disposición servicios gratuitos de asistencia lingüística. Carlos Albertoame al [phone].    We comply with applicable federal civil rights laws and [place] laws. We do  not discriminate on the basis of race, color, national origin, age, disability, sex, sexual orientation, or gender identity.            Thank you!     Thank you for choosing EYE CLINIC  for your care. Our goal is always to provide you with excellent care. Hearing back from our patients is one way we can continue to improve our services. Please take a few minutes to complete the written survey that you may receive in the mail after your visit with us. Thank you!             Your Updated Medication List - Protect others around you: Learn how to safely use, store and throw away your medicines at www.disposemymeds.org.          This list is accurate as of: 10/24/17  3:28 PM.  Always use your most recent med list.                   Brand Name Dispense Instructions for use Diagnosis    alendronate 70 MG tablet    FOSAMAX     Take 70 mg by mouth        apixaban ANTICOAGULANT 2.5 MG tablet    ELIQUIS     Take 2.5 mg by mouth        aspirin 325 MG tablet      Take 325 mg by mouth daily.        brinzolamide 1 % ophthalmic susp    AZOPT     Place 1 drop into the right eye 3 times daily        butalbital-aspirin-caffeine -40 MG per capsule    FIORINAL     Take 1 capsule by mouth 3 times daily as needed.        ciprofloxacin 0.3 % ophthalmic solution    CILOXAN    1 Bottle    Place 1 drop Into the left eye 4 times daily    Primary open angle glaucoma of both eyes, severe stage       dorzolamide 2 % ophthalmic solution    TRUSOPT     INT ONE GTT KELLEE BID        levothyroxine 100 MCG tablet    SYNTHROID    90 tablet    Take 1 tablet by mouth daily. Indications: Underactive Thyroid    Hypothyroidism       lisinopril 10 MG tablet    PRINIVIL/ZESTRIL     Take 10 mg by mouth        metoprolol 25 MG 24 hr tablet    TOPROL-XL     Take 25 mg by mouth        prednisoLONE acetate 1 % ophthalmic susp    PRED FORTE    15 mL    Place 1 drop Into the left eye every 2 hours    Primary open angle glaucoma of both eyes, severe stage        TRAVATAN Z 0.004 % ophthalmic solution   Generic drug:  travoprost (SOCO Free)      Place 1 drop into the right eye At Bedtime        traZODone 50 MG tablet    DESYREL     Take 50 mg by mouth

## 2017-10-24 NOTE — PROGRESS NOTES
1)POAG/?LTG OS>>OD -- s/p Tube OS (10/16/17), ?H/O Hypotension and Bradycardia per son -- K pachy:519/495    Tmax: teens/28    HVF: OD:?Sup arcuate and OS:Sup hemifield with inferior arcuate and dec MD ?[place]      CDR:0.6/0.9     HRT/OCT: OD:Mod-Severe RNFl thinning and OS:Severe RNFL thinning       FHX of Glc: No     Gonio: open        Intolerant to: ?Cosopt -- drop was d/c 2/2 irritation per son (?T1/2 as patient is tolerating Azopt), Simbrinza -- irritation, injection all day  (d/c 8/17)   Asthma/COPD:No, on po BB   Steroid Use: No     Kidney Stones: No     Sulfa Allergy: No     IOP targets: -- IOP improved -- rec Trab, mCPC, Tube OS  2)PCIOL OU -- following with Dr. Jones  3)H/O Afib on Eliquis s/p Pacemaker -- (Kelvin De La Torre) who is a radiologist is POA -- [phone], Caregiver (Lou) [phone]    MD:Assisted living facility administers drops    8/19/17: Phone dIscussion with Kelvin (LETHA) -- would proceed with tube given follow up issues and declining misha.      Will obtain old notes and visual field tests.  Patient will continue on Travatan which is a teal top drop at bedtime in the RIGHT EYE ONLY and Azopt (brinzolamide) which is an orange top drop 3x/day (8 hours apart) in the RIGHT EYE ONLY.      No heavy lifting, bending, stooping, straining, rubbing the eye, or getting water in the eye.  Please wear protective eyewear over the eye at all times including glasses during the day and the protective shield at bedtime.  Patient will return to clinic in 3-4 weeks with repeat evaluation.    Use the following drops (LEFT EYE ONLY):  Antibiotic --  Ciprofloxacin: DIscontinue    Steroid -- Pred Forte/Prednisolone Acetate: every 2 hours while awake for 1 week then 4x/day until seen    Please be sure to call if you have any decrease in vision, increase in pain, flashing lights, redness, or a lot of drainage.          Attending Physician Attestation:  Complete documentation of historical and exam  elements from today's encounter can be found in the full encounter summary report (not reduplicated in this progress note). I personally obtained the chief complaint(s) and history of present illness.  I confirmed and edited as necessary the review of systems, past medical/surgical history, family history, social history, and examination findings as documented by others; and I examined the patient myself. I personally reviewed the relevant tests, images, and reports as documented above. I formulated and edited as necessary the assessment and plan and discussed the findings and management plan with the patient and family.  - Gisele Castro MD

## 2017-11-21 ENCOUNTER — OFFICE VISIT (OUTPATIENT)
Dept: OPHTHALMOLOGY | Facility: CLINIC | Age: 82
End: 2017-11-21
Attending: OPHTHALMOLOGY
Payer: MEDICARE

## 2017-11-21 DIAGNOSIS — H40.1133 PRIMARY OPEN ANGLE GLAUCOMA OF BOTH EYES, SEVERE STAGE: Primary | ICD-10-CM

## 2017-11-21 PROCEDURE — 99213 OFFICE O/P EST LOW 20 MIN: CPT | Mod: ZF

## 2017-11-21 ASSESSMENT — VISUAL ACUITY
OS_CC: 20/30
OD_CC: 20/25
METHOD: SNELLEN - LINEAR
CORRECTION_TYPE: GLASSES
OS_CC+: -2

## 2017-11-21 ASSESSMENT — SLIT LAMP EXAM - LIDS: COMMENTS: NORMAL

## 2017-11-21 ASSESSMENT — TONOMETRY
IOP_METHOD: APPLANATION
OS_IOP_MMHG: 14
OD_IOP_MMHG: 13

## 2017-11-21 ASSESSMENT — REFRACTION_WEARINGRX
OS_CYLINDER: +0.25
OS_ADD: +2.75
OD_ADD: +2.75
OS_AXIS: 015
OD_AXIS: 015
OD_SPHERE: -1.50
OS_SPHERE: -0.50
OD_CYLINDER: +0.75

## 2017-11-21 ASSESSMENT — CONF VISUAL FIELD: OS_SUPERIOR_TEMPORAL_RESTRICTION: 3

## 2017-11-21 ASSESSMENT — EXTERNAL EXAM - LEFT EYE: OS_EXAM: NORMAL

## 2017-11-21 ASSESSMENT — EXTERNAL EXAM - RIGHT EYE: OD_EXAM: NORMAL

## 2017-11-21 NOTE — NURSING NOTE
Chief Complaints and History of Present Illnesses   Patient presents with     Follow Up For     POAG     HPI    Affected eye(s):  Both   Symptoms:        Frequency:  Constant       Do you have eye pain now?:  No      Comments:  States va is the same since last visit  occ arnold Arrington COT 3:11 PM November 21, 2017

## 2017-11-21 NOTE — PROGRESS NOTES
1)POAG/?LTG OS>>OD -- s/p Tube OS (10/16/17), ?H/O Hypotension and Bradycardia per son -- K pachy:519/495    Tmax: teens/28    HVF: OD:?Sup arcuate and OS:Sup hemifield with inferior arcuate and dec MD ?[place]      CDR:0.6/0.9     HRT/OCT: OD:Mod-Severe RNFl thinning and OS:Severe RNFL thinning       FHX of Glc: No     Gonio: open        Intolerant to: ?Cosopt -- drop was d/c 2/2 irritation per son (?T1/2 as patient is tolerating Azopt), Simbrinza -- irritation, injection all day  (d/c 8/17)   Asthma/COPD:No, on po BB   Steroid Use: No     Kidney Stones: No     Sulfa Allergy: No     IOP targets: -- IOP improved -- rec Trab, mCPC, Tube OS  2)PCIOL OU -- following with Dr. Jones  3)H/O Afib on Eliquis s/p Pacemaker -- (Kelvin De La Torre) who is a radiologist is POA -- [phone], Caregiver (Lou) [phone]    MD:Assisted living facility administers drops    8/19/17: Phone dIscussion with Kelvin (LETHA) -- would proceed with tube given follow up issues and declining misha.      Will obtain old notes and visual field tests.  Patient will continue on Travatan which is a teal top drop at bedtime in the RIGHT EYE ONLY and Azopt (brinzolamide) which is an orange top drop 3x/day (8 hours apart) in the RIGHT EYE ONLY.      No heavy lifting, bending, stooping, straining, rubbing the eye, or getting water in the eye.  Please wear protective eyewear over the eye at all times including glasses during the day and the protective shield at bedtime.  Patient will return to clinic in 1-2 months with repeat evaluation.    Use the following drops (LEFT EYE ONLY):    Steroid -- Pred Forte/Prednisolone Acetate: 3x/day for 3 weeks then 2x/day until seen    Please be sure to call if you have any decrease in vision, increase in pain, flashing lights, redness, or a lot of drainage.          Attending Physician Attestation:  Complete documentation of historical and exam elements from today's encounter can be found in the full  encounter summary report (not reduplicated in this progress note). I personally obtained the chief complaint(s) and history of present illness.  I confirmed and edited as necessary the review of systems, past medical/surgical history, family history, social history, and examination findings as documented by others; and I examined the patient myself. I personally reviewed the relevant tests, images, and reports as documented above. I formulated and edited as necessary the assessment and plan and discussed the findings and management plan with the patient and family.  - Gisele Castro MD

## 2017-11-21 NOTE — PATIENT INSTRUCTIONS
Will obtain old notes and visual field tests.  Patient will continue on Travatan which is a teal top drop at bedtime in the RIGHT EYE ONLY and Azopt (brinzolamide) which is an orange top drop 3x/day (8 hours apart) in the RIGHT EYE ONLY.      No heavy lifting, bending, stooping, straining, rubbing the eye, or getting water in the eye.  Please wear protective eyewear over the eye at all times including glasses during the day and the protective shield at bedtime.  Patient will return to clinic in 1-2 months with repeat evaluation.    Use the following drops (LEFT EYE ONLY):    Steroid -- Pred Forte/Prednisolone Acetate: 3x/day for 3 weeks then 2x/day for 3 weeks then 1x/day for 3 weeks then stop    Please be sure to call if you have any decrease in vision, increase in pain, flashing lights, redness, or a lot of drainage.

## 2017-11-21 NOTE — MR AVS SNAPSHOT
After Visit Summary   11/21/2017    Annabel May    MRN: [id number]           Patient Information     Date Of Birth          5/12/1934        Visit Information        Provider Department      11/21/2017 2:45 PM Gisele Castro [facility]        Today's Diagnoses     Primary open angle glaucoma of both eyes, severe stage    -  1      Care Instructions    Will obtain old notes and visual field tests.  Patient will continue on Travatan which is a teal top drop at bedtime in the RIGHT EYE ONLY and Azopt (brinzolamide) which is an orange top drop 3x/day (8 hours apart) in the RIGHT EYE ONLY.      No heavy lifting, bending, stooping, straining, rubbing the eye, or getting water in the eye.  Please wear protective eyewear over the eye at all times including glasses during the day and the protective shield at bedtime.  Patient will return to clinic in 1-2 months with repeat evaluation.    Use the following drops (LEFT EYE ONLY):    Steroid -- Pred Forte/Prednisolone Acetate: 3x/day for 3 weeks then 2x/day for 3 weeks then 1x/day for 3 weeks then stop    Please be sure to call if you have any decrease in vision, increase in pain, flashing lights, redness, or a lot of drainage.            Follow-ups after your visit        Follow-up notes from your care team     Return 2-3 months with repeat evaluation.      Who to contact     Please call your clinic at [phone] to:    Ask questions about your health    Make or cancel appointments    Discuss your medicines    Learn about your test results    Speak to your doctor   If you have compliments or concerns about an experience at your clinic, or if you wish to file a complaint, please contact [facility] Patient Relations at [phone] or email us at [email or web address]         Additional Information About Your Visit        knowNormalhart Information     [employer] is an electronic gateway that provides easy, online access to  your medical records. With [employer], you can request a clinic appointment, read your test results, renew a prescription or communicate with your care team.     To sign up for [employer] visit the website at [email or web address]   You will be asked to enter the access code listed below, as well as some personal information. Please follow the directions to create your username and password.     Your access code is: [email or web address]  Expires: 2017  3:29 PM     Your access code will  in 90 days. If you need help or a new code, please contact your [facility] or call [phone] for assistance.        Care EveryWhere ID     This is your Care EveryWhere ID. This could be used by other organizations to access your [facility] medical records  [id number]         Blood Pressure from Last 3 Encounters:   10/30/14 138/80   12 134/85   12 109/77    Weight from Last 3 Encounters:   10/30/14 59.9 kg (132 lb)   12 64.4 kg (142 lb)   12 65.8 kg (145 lb)              Today, you had the following     No orders found for display       Primary Care Provider Office Phone # Fax #    Mynor Alvarez [phone]       [place] [address]        Equal Access to Services     [facility] : Hadii michi ku hadasho Soomaali, waaxda luqadaha, qaybta kaalmada adeegyada, zeinab gastelum hayhilario russ . So [facility] [phone].    ATENCIÓN: Si habla español, tiene a murillo disposición servicios gratuitos de asistencia lingüística. Llame al [phone].    We comply with applicable federal civil rights laws and [place] laws. We do not discriminate on the basis of race, color, national origin, age, disability, sex, sexual orientation, or gender identity.            Thank you!     Thank you for choosing [facility]  for your care. Our goal is always to provide you with excellent care. Hearing back from our patients is one  way we can continue to improve our services. Please take a few minutes to complete the written survey that you may receive in the mail after your visit with us. Thank you!             Your Updated Medication List - Protect others around you: Learn how to safely use, store and throw away your medicines at www.disposemymeds.org.          This list is accurate as of: 11/21/17  3:32 PM.  Always use your most recent med list.                   Brand Name Dispense Instructions for use Diagnosis    alendronate 70 MG tablet    FOSAMAX     Take 70 mg by mouth        apixaban ANTICOAGULANT 2.5 MG tablet    ELIQUIS     Take 2.5 mg by mouth        aspirin 325 MG tablet      Take 325 mg by mouth daily.        brinzolamide 1 % ophthalmic susp    AZOPT     Place 1 drop into the right eye 3 times daily        butalbital-aspirin-caffeine -40 MG per capsule    FIORINAL     Take 1 capsule by mouth 3 times daily as needed.        ciprofloxacin 0.3 % ophthalmic solution    CILOXAN    1 Bottle    Place 1 drop Into the left eye 4 times daily    Primary open angle glaucoma of both eyes, severe stage       dorzolamide 2 % ophthalmic solution    TRUSOPT     INT ONE GTT KELLEE BID        levothyroxine 100 MCG tablet    SYNTHROID    90 tablet    Take 1 tablet by mouth daily. Indications: Underactive Thyroid    Hypothyroidism       lisinopril 10 MG tablet    PRINIVIL/ZESTRIL     Take 10 mg by mouth        metoprolol 25 MG 24 hr tablet    TOPROL-XL     Take 25 mg by mouth        prednisoLONE acetate 1 % ophthalmic susp    PRED FORTE    15 mL    Place 1 drop Into the left eye every 2 hours    Primary open angle glaucoma of both eyes, severe stage       TRAVATAN Z 0.004 % ophthalmic solution   Generic drug:  travoprost (BAK Free)      Place 1 drop into the right eye At Bedtime        traZODone 50 MG tablet    DESYREL     Take 50 mg by mouth

## 2019-06-20 ENCOUNTER — HOSPITAL ENCOUNTER (EMERGENCY)
Facility: CLINIC | Age: 84
Discharge: HOME | End: 2019-06-20
Attending: EMERGENCY MEDICINE | Admitting: EMERGENCY MEDICINE
Payer: MEDICARE

## 2019-06-20 ENCOUNTER — APPOINTMENT (OUTPATIENT)
Dept: GENERAL RADIOLOGY | Facility: CLINIC | Age: 84
End: 2019-06-20
Attending: EMERGENCY MEDICINE
Payer: MEDICARE

## 2019-06-20 VITALS
BODY MASS INDEX: 21.03 KG/M2 | HEIGHT: 67 IN | DIASTOLIC BLOOD PRESSURE: 94 MMHG | RESPIRATION RATE: 16 BRPM | SYSTOLIC BLOOD PRESSURE: 166 MMHG | OXYGEN SATURATION: 96 % | TEMPERATURE: 98 F | WEIGHT: 134 LBS | HEART RATE: 62 BPM

## 2019-06-20 DIAGNOSIS — S97.80XA CRUSHING INJURY OF FOOT, UNSPECIFIED LATERALITY, INITIAL ENCOUNTER: ICD-10-CM

## 2019-06-20 PROCEDURE — 99283 EMERGENCY DEPT VISIT LOW MDM: CPT

## 2019-06-20 PROCEDURE — 25000132 ZZH RX MED GY IP 250 OP 250 PS 637: Mod: GY | Performed by: EMERGENCY MEDICINE

## 2019-06-20 PROCEDURE — A9270 NON-COVERED ITEM OR SERVICE: HCPCS | Mod: GY | Performed by: EMERGENCY MEDICINE

## 2019-06-20 PROCEDURE — 25000131 ZZH RX MED GY IP 250 OP 636 PS 637: Mod: GY | Performed by: EMERGENCY MEDICINE

## 2019-06-20 PROCEDURE — 73630 X-RAY EXAM OF FOOT: CPT | Mod: 50

## 2019-06-20 RX ORDER — ONDANSETRON 4 MG/1
4 TABLET, ORALLY DISINTEGRATING ORAL ONCE
Status: COMPLETED | OUTPATIENT
Start: 2019-06-20 | End: 2019-06-20

## 2019-06-20 RX ORDER — HYDROCODONE BITARTRATE AND ACETAMINOPHEN 5; 325 MG/1; MG/1
1 TABLET ORAL ONCE
Status: COMPLETED | OUTPATIENT
Start: 2019-06-20 | End: 2019-06-20

## 2019-06-20 RX ORDER — HYDROCODONE BITARTRATE AND ACETAMINOPHEN 5; 325 MG/1; MG/1
1 TABLET ORAL EVERY 6 HOURS PRN
Qty: 10 TABLET | Refills: 0 | Status: SHIPPED | OUTPATIENT
Start: 2019-06-20 | End: 2019-06-23

## 2019-06-20 RX ADMIN — ONDANSETRON 4 MG: 4 TABLET, ORALLY DISINTEGRATING ORAL at 19:01

## 2019-06-20 RX ADMIN — HYDROCODONE BITARTRATE AND ACETAMINOPHEN 1 TABLET: 5; 325 TABLET ORAL at 19:15

## 2019-06-20 ASSESSMENT — MIFFLIN-ST. JEOR: SCORE: 1085.45

## 2019-06-20 ASSESSMENT — ENCOUNTER SYMPTOMS
ARTHRALGIAS: 1
MYALGIAS: 1
JOINT SWELLING: 1
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
## 2019-06-20 NOTE — ED AVS SNAPSHOT
Emergency Department  [address] [place] [address]  Phone:  [phone]  Fax:  [phone]                                    Annabel May   MRN: [id number]    Department:   Emergency Department   Date of Visit:  6/20/2019           After Visit Summary Signature Page    I have received my discharge instructions, and my questions have been answered. I have discussed any challenges I see with this plan with the nurse or doctor.    ..........................................................................................................................................  Patient/Patient Representative Signature      ..........................................................................................................................................  Patient Representative Print Name and Relationship to Patient    ..................................................               ................................................  Date                                   Time    ..........................................................................................................................................  Reviewed by Signature/Title    ...................................................              ..............................................  Date                                               Time          22EPIC Rev 08/18

## 2019-06-20 NOTE — ED NOTES
Bed: ED15  Expected date:   Expected time:   Means of arrival:   Comments:  E1  86 F bilat foot injury  1818
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
## 2019-12-04 ENCOUNTER — APPOINTMENT (OUTPATIENT)
Dept: GENERAL RADIOLOGY | Facility: CLINIC | Age: 84
End: 2019-12-04
Attending: EMERGENCY MEDICINE
Payer: MEDICARE

## 2019-12-04 ENCOUNTER — HOSPITAL ENCOUNTER (EMERGENCY)
Facility: CLINIC | Age: 84
Discharge: HOME | End: 2019-12-04
Attending: EMERGENCY MEDICINE | Admitting: EMERGENCY MEDICINE
Payer: MEDICARE

## 2019-12-04 VITALS
OXYGEN SATURATION: 97 % | BODY MASS INDEX: 20.4 KG/M2 | WEIGHT: 130 LBS | DIASTOLIC BLOOD PRESSURE: 97 MMHG | SYSTOLIC BLOOD PRESSURE: 172 MMHG | HEIGHT: 67 IN | TEMPERATURE: 97.3 F | RESPIRATION RATE: 18 BRPM

## 2019-12-04 DIAGNOSIS — R07.9 CHEST PAIN, UNSPECIFIED TYPE: ICD-10-CM

## 2019-12-04 LAB
ANION GAP SERPL CALCULATED.3IONS-SCNC: 6 MMOL/L (ref 3–14)
BASOPHILS # BLD AUTO: 0 10E9/L (ref 0–0.2)
BASOPHILS NFR BLD AUTO: 0.2 %
BUN SERPL-MCNC: 19 MG/DL (ref 7–30)
CALCIUM SERPL-MCNC: 8.8 MG/DL (ref 8.5–10.1)
CHLORIDE SERPL-SCNC: 111 MMOL/L (ref 94–109)
CO2 SERPL-SCNC: 23 MMOL/L (ref 20–32)
CREAT SERPL-MCNC: 1.06 MG/DL (ref 0.52–1.04)
DIFFERENTIAL METHOD BLD: NORMAL
EOSINOPHIL # BLD AUTO: 0.1 10E9/L (ref 0–0.7)
EOSINOPHIL NFR BLD AUTO: 1.5 %
ERYTHROCYTE [DISTWIDTH] IN BLOOD BY AUTOMATED COUNT: 13.5 % (ref 10–15)
GFR SERPL CREATININE-BSD FRML MDRD: 48 ML/MIN/{1.73_M2}
GLUCOSE SERPL-MCNC: 95 MG/DL (ref 70–99)
HCT VFR BLD AUTO: 42.5 % (ref 35–47)
HGB BLD-MCNC: 14 G/DL (ref 11.7–15.7)
IMM GRANULOCYTES # BLD: 0 10E9/L (ref 0–0.4)
IMM GRANULOCYTES NFR BLD: 0.2 %
INTERPRETATION ECG - MUSE: NORMAL
LYMPHOCYTES # BLD AUTO: 2.4 10E9/L (ref 0.8–5.3)
LYMPHOCYTES NFR BLD AUTO: 29.6 %
MCH RBC QN AUTO: 31.6 PG (ref 26.5–33)
MCHC RBC AUTO-ENTMCNC: 32.9 G/DL (ref 31.5–36.5)
MCV RBC AUTO: 96 FL (ref 78–100)
MONOCYTES # BLD AUTO: 0.6 10E9/L (ref 0–1.3)
MONOCYTES NFR BLD AUTO: 7.9 %
NEUTROPHILS # BLD AUTO: 4.9 10E9/L (ref 1.6–8.3)
NEUTROPHILS NFR BLD AUTO: 60.6 %
NRBC # BLD AUTO: 0 10*3/UL
NRBC BLD AUTO-RTO: 0 /100
PLATELET # BLD AUTO: 163 10E9/L (ref 150–450)
POTASSIUM SERPL-SCNC: 4.5 MMOL/L (ref 3.4–5.3)
RBC # BLD AUTO: 4.43 10E12/L (ref 3.8–5.2)
SODIUM SERPL-SCNC: 140 MMOL/L (ref 133–144)
TROPONIN I SERPL-MCNC: <0.015 UG/L (ref 0–0.04)
WBC # BLD AUTO: 8.1 10E9/L (ref 4–11)

## 2019-12-04 PROCEDURE — 99285 EMERGENCY DEPT VISIT HI MDM: CPT | Mod: 25

## 2019-12-04 PROCEDURE — 93005 ELECTROCARDIOGRAM TRACING: CPT | Mod: 76

## 2019-12-04 PROCEDURE — 80048 BASIC METABOLIC PNL TOTAL CA: CPT | Performed by: EMERGENCY MEDICINE

## 2019-12-04 PROCEDURE — 93005 ELECTROCARDIOGRAM TRACING: CPT

## 2019-12-04 PROCEDURE — 71046 X-RAY EXAM CHEST 2 VIEWS: CPT

## 2019-12-04 PROCEDURE — 84484 ASSAY OF TROPONIN QUANT: CPT | Performed by: EMERGENCY MEDICINE

## 2019-12-04 PROCEDURE — 85025 COMPLETE CBC W/AUTO DIFF WBC: CPT | Performed by: EMERGENCY MEDICINE

## 2019-12-04 ASSESSMENT — MIFFLIN-ST. JEOR: SCORE: 1067.31

## 2019-12-04 ASSESSMENT — ENCOUNTER SYMPTOMS: VOMITING: 0

## 2019-12-04 NOTE — DISCHARGE INSTRUCTIONS
Return to the emergency department if you have chest pain again or any other new or concerning symptoms.  Otherwise, follow-up with your primary care provider later this week to ensure you are doing well.

## 2019-12-04 NOTE — ED AVS SNAPSHOT
Emergency Department  [address] [place] [address]  Phone:  [phone]  Fax:  [phone]                                    Annabel May   MRN: [id number]    Department:   Emergency Department   Date of Visit:  12/4/2019           After Visit Summary Signature Page    I have received my discharge instructions, and my questions have been answered. I have discussed any challenges I see with this plan with the nurse or doctor.    ..........................................................................................................................................  Patient/Patient Representative Signature      ..........................................................................................................................................  Patient Representative Print Name and Relationship to Patient    ..................................................               ................................................  Date                                   Time    ..........................................................................................................................................  Reviewed by Signature/Title    ...................................................              ..............................................  Date                                               Time          22EPIC Rev 08/18

## 2019-12-04 NOTE — ED PROVIDER NOTES
History     Chief Complaint:  Chest Pain    The history is provided by the patient, the spouse and a caregiver.      Annabel May is an 85 year old female with dementia, atrial fibrillation s/p pacemaker on Eliquis, and HTN who presents with chest pain. Annabel cannot provide meaningful history due to dementia but believes she has had intermittent chest pain. She is unsure of any other events today and is requesting discharge.    Her PCA was with the patient and her  in the bedroom. Annabel got up and walked into the hallway. The PCA did not hear Annabel fall, but when she went to check on her, the patient was on her hands and knees on the ground. Her PCA believes the patient lowered herself to the floor. She was complaining of chest pain - which she has never done before - prompting ER visit. She had no vomiting or other symptoms noted. She is at her baseline mental status per PCA.    Notably, the patient's son Kelvin makes medical decisions for her. He is a radiologist.     Allergies:  Fentanyl   Midazolam   Vecuronium     Medications:    Fosamax   Eliquis   Fiorinal   Levothyroxine   Lisinopril   Metoprolol   Desyrel      Past Medical History:    Hyperlipidemia   Cardiac dysrhythmia   Memory loss   Hypothyroid   Hypertension   Arthritis   Breast Cancer   Migraines   Syncope    Past Surgical History:    Appendectomy   Breast surgery   Cholecystectomy   Partial Thyroidectomy   Glaucoma surgery   Head and neck surgery   Hysterectomy   Cornea implant right   Pacemaker implant    Family History:   No past pertinent family history.    Social History:  Negative for tobacco use.  Occasional alcohol use   Negative for drug use.  Marital Status:     Presents with her  and PCA.    Review of Systems   Unable to perform ROS: Dementia   Cardiovascular: Positive for chest pain.   Gastrointestinal: Negative for vomiting.   Psychiatric/Behavioral: Positive for confusion (baseline). The patient is nervous/anxious.   "    Physical Exam     Patient Vitals for the past 24 hrs:   BP Temp Temp src Heart Rate Resp SpO2 Height Weight   12/04/19 1501 -- -- -- 85 18 -- -- --   12/04/19 1412 (!) 172/97 97.3  F (36.3  C) Oral 63 16 97 % 1.702 m (5' 7\") 59 kg (130 lb)       Physical Exam  General: Well-developed and well-nourished. Well appearing elderly  woman. Cooperative.  Head:  Atraumatic.  Eyes:  Conjunctivae, lids, and sclerae are normal.  ENT:    Normal nose. Moist mucous membranes.  Neck:  Supple. Normal range of motion.  CV:  Regular rate and rhythm. Normal heart sounds with no murmurs, rubs, or gallops detected.  Resp:  No respiratory distress. Clear to auscultation bilaterally without decreased breath sounds, wheezing, rales, or rhonchi.  GI:  Soft. Non-distended. Non-tender.    MS:  Normal ROM. No bilateral lower extremity edema. No reproducible chest wall pain.  Skin:  Warm. Non-diaphoretic. No pallor.  Neuro: Awake. A&Ox1 (unable to state year or location). Normal strength.  Psych:  Anxious mood and tearful affect. Normal speech.  Vitals reviewed.    Emergency Department Course   EKG #1  Indication: Chest Pain  Time: 1417  Rate 72 bpm. MO interval 190. QRS duration 162. QT/QTc 462/505.   Ventricular paced rhythm.   No acute ST changes.  Pacemaker new compared to prior, dated 10/31/14.    EKG #2  Indication: Chest Pain  Time: 1644  Rate 72 bpm. MO interval 190. QRS duration 162. QT/QTc 462/505.   Ventricular paced rhythm.  No acute ST changes.  Unchanged from EKG #1.    Imaging:  Radiographic findings were communicated with the patient who voiced understanding of the findings.    XR Chest 2 views:   Two views of the chest are performed. Mild interstitial  lung changes appears stable. Possible infiltrate posterior right  costophrenic angle, obscured on the lateral view. Lungs are otherwise  clear on the frontal view. Heart is mildly prominent in size but  unchanged. There has been interval placement of a left cardiac " pacer  with leads overlying the right atrium and right ventricle.  Degenerative spine changes are noted. No evidence of pneumothorax, As per radiology.       Laboratory:  CBC: WBC: 8.1, HGB: 14.0, PLT: 163  BMP: Chloride: 111 (H), Creatinine: 1.06 (H), GFR: 48 (L), o/w WNL    1421 Troponin: <0.015    Emergency Department Course:  Nursing notes and vitals reviewed. (1510) I performed an exam of the patient as documented above.     IV inserted. Blood drawn. This was sent to the lab for further testing, results above.    The patient was sent for a chest XR while in the emergency department, findings above.   EKG obtained in the ED, see results above.     (1717) I rechecked the patient and discussed the results of her workup thus far. Patient is crying and saying that she wants to go home. Very confused.     (1718) I called and spoke with her son Kelvin ([phone]) regarding his mother's stay in the ED.     Findings and plan explained to the patient and caregiver. Patient discharged home with instructions regarding supportive care, medications, and reasons to return. The importance of close follow-up was reviewed.     I personally reviewed the laboratory and imaging results with the caregiver and answered all related questions prior to discharge.     Impression & Plan    Medical Decision Making:  Annabel is an 85-year-old female with dementia brought in by her PCA after she was found on the ground saying she had chest pain.  Her PCA stated Annabel was just outside the door and she does not believe the patient fell but rather lowered herself to the floor.  Annabel's dementia limits history and she is unable to clearly state if and when she had chest pain.  Throughout interview she frequently asks if she can go home.  Her exam is unremarkable aside from her dementia and anxiety.  EKG is ventricularly paced rhythm and unchanged on repeat without ischemia or arrhythmias.  Troponin is undetectable and laboratory studies are grossly  unremarkable with a creatinine of 1.06 which is mildly increased from last known value in our system of 0.88 in 2014 though this was several years ago and 1.06 is likely her baseline.  Chest x-ray reveals possible infiltrate in the posterior right costophrenic angle which is obscured on the lateral view.  PCA states patient has been well recently and she has no leukocytosis and had no cough or fever here.  As such, I think this is unlikely to represent a pneumonia and will not give antibiotics. I doubt syncope based on history from PCA. I doubt PE as she is on Eliquis.  I discussed these results with the patient's son, Kelvin, via phone.  He is a radiologist and agrees that she is appropriate for discharge given her reassuring work-up.  He understands that cardiac evaluation with serial troponins was not completed but states that family would likely not want to pursue angiogram, for example, if she did need this and feels comfortable with her going home.  On reevaluation Annabel is even more anxious and seems to be sundowning and I feel it is more appropriate to discharge this woman rather than cause worsening of these symptoms by keeping her in the hospital for chest pain rule out work up.  I discussed the plan with patient's PCA who is comfortable with discharge but does agree to return with the patient should there be recurrence of chest pain or any other symptoms.  I recommended patient be seen by primary care later this week.  All questions answered.  Patient requesting discharge and family and PCA comfortable with this plan.    Diagnosis:    ICD-10-CM    1. Chest pain, unspecified type R07.9        Disposition:  discharged home with PCA and     Scribe Disclosure:  MARIA DE JESUS, Patti Harman, am serving as a scribe on 12/4/2019 at 3:10 PM to personally document services performed by Gricelda De Jesus MD based on my observations and the provider's statements to me.       Patti Harman  12/4/2019    EMERGENCY DEPARTMENT        Gricelda De Jesus MD  12/06/19 150       Gricelda De Jesus MD  12/06/19 5245

## 2019-12-04 NOTE — ED NOTES
Pt anxious, asking for in home caregiver to leave the room.  Caregiver states this is baseline behavior for the pt.

## 2019-12-04 NOTE — ED TRIAGE NOTES
Pt found on ground complaining of chest pain by RN doing wellness visit for .  Pt currently denies CP.  Pt denies falling.

## 2019-12-06 ASSESSMENT — ENCOUNTER SYMPTOMS
CONFUSION: 1
NERVOUS/ANXIOUS: 1

## 2020-01-06 ENCOUNTER — APPOINTMENT (OUTPATIENT)
Dept: CT IMAGING | Facility: CLINIC | Age: 85
DRG: 884 | End: 2020-01-06
Attending: EMERGENCY MEDICINE
Payer: MEDICARE

## 2020-01-06 ENCOUNTER — APPOINTMENT (OUTPATIENT)
Dept: GENERAL RADIOLOGY | Facility: CLINIC | Age: 85
DRG: 884 | End: 2020-01-06
Attending: EMERGENCY MEDICINE
Payer: MEDICARE

## 2020-01-06 ENCOUNTER — HOSPITAL ENCOUNTER (INPATIENT)
Facility: CLINIC | Age: 85
LOS: 5 days | Discharge: NURSING, REHAB OR LONG-TERM CARE | DRG: 884 | End: 2020-01-14
Attending: EMERGENCY MEDICINE | Admitting: HOSPITALIST
Payer: MEDICARE

## 2020-01-06 DIAGNOSIS — F03.91 DEMENTIA WITH BEHAVIORAL DISTURBANCE, UNSPECIFIED DEMENTIA TYPE: Primary | ICD-10-CM

## 2020-01-06 DIAGNOSIS — K59.00 CONSTIPATION, UNSPECIFIED CONSTIPATION TYPE: ICD-10-CM

## 2020-01-06 DIAGNOSIS — R41.89 COGNITIVE AND BEHAVIORAL CHANGES: ICD-10-CM

## 2020-01-06 DIAGNOSIS — R46.89 COGNITIVE AND BEHAVIORAL CHANGES: ICD-10-CM

## 2020-01-06 PROBLEM — F03.918 DEMENTIA WITH BEHAVIORAL DISTURBANCE (H): Status: ACTIVE | Noted: 2020-01-06

## 2020-01-06 LAB
ALBUMIN SERPL-MCNC: 4.4 G/DL (ref 3.4–5)
ALBUMIN UR-MCNC: NEGATIVE MG/DL
ALP SERPL-CCNC: 66 U/L (ref 40–150)
ALT SERPL W P-5'-P-CCNC: 29 U/L (ref 0–50)
ANION GAP SERPL CALCULATED.3IONS-SCNC: 7 MMOL/L (ref 3–14)
APPEARANCE UR: ABNORMAL
AST SERPL W P-5'-P-CCNC: 31 U/L (ref 0–45)
BASOPHILS # BLD AUTO: 0 10E9/L (ref 0–0.2)
BASOPHILS NFR BLD AUTO: 0.3 %
BILIRUB SERPL-MCNC: 0.9 MG/DL (ref 0.2–1.3)
BILIRUB UR QL STRIP: NEGATIVE
BUN SERPL-MCNC: 22 MG/DL (ref 7–30)
CALCIUM SERPL-MCNC: 9.9 MG/DL (ref 8.5–10.1)
CHLORIDE SERPL-SCNC: 111 MMOL/L (ref 94–109)
CO2 SERPL-SCNC: 23 MMOL/L (ref 20–32)
COLOR UR AUTO: ABNORMAL
CREAT SERPL-MCNC: 1.02 MG/DL (ref 0.52–1.04)
DIFFERENTIAL METHOD BLD: NORMAL
EOSINOPHIL # BLD AUTO: 0.1 10E9/L (ref 0–0.7)
EOSINOPHIL NFR BLD AUTO: 1.5 %
ERYTHROCYTE [DISTWIDTH] IN BLOOD BY AUTOMATED COUNT: 13.8 % (ref 10–15)
GFR SERPL CREATININE-BSD FRML MDRD: 50 ML/MIN/{1.73_M2}
GLUCOSE SERPL-MCNC: 93 MG/DL (ref 70–99)
GLUCOSE UR STRIP-MCNC: NEGATIVE MG/DL
HCT VFR BLD AUTO: 44.3 % (ref 35–47)
HGB BLD-MCNC: 14.7 G/DL (ref 11.7–15.7)
HGB UR QL STRIP: NEGATIVE
IMM GRANULOCYTES # BLD: 0 10E9/L (ref 0–0.4)
IMM GRANULOCYTES NFR BLD: 0 %
KETONES UR STRIP-MCNC: 10 MG/DL
LEUKOCYTE ESTERASE UR QL STRIP: ABNORMAL
LIPASE SERPL-CCNC: 244 U/L (ref 73–393)
LYMPHOCYTES # BLD AUTO: 2.9 10E9/L (ref 0.8–5.3)
LYMPHOCYTES NFR BLD AUTO: 36.8 %
MCH RBC QN AUTO: 31.3 PG (ref 26.5–33)
MCHC RBC AUTO-ENTMCNC: 33.2 G/DL (ref 31.5–36.5)
MCV RBC AUTO: 94 FL (ref 78–100)
MONOCYTES # BLD AUTO: 0.7 10E9/L (ref 0–1.3)
MONOCYTES NFR BLD AUTO: 8.5 %
MUCOUS THREADS #/AREA URNS LPF: PRESENT /LPF
NEUTROPHILS # BLD AUTO: 4.2 10E9/L (ref 1.6–8.3)
NEUTROPHILS NFR BLD AUTO: 52.9 %
NITRATE UR QL: NEGATIVE
PH UR STRIP: 8 PH (ref 5–7)
PLATELET # BLD AUTO: 172 10E9/L (ref 150–450)
POTASSIUM SERPL-SCNC: 3.8 MMOL/L (ref 3.4–5.3)
PROT SERPL-MCNC: 7.7 G/DL (ref 6.8–8.8)
RBC # BLD AUTO: 4.7 10E12/L (ref 3.8–5.2)
RBC #/AREA URNS AUTO: 13 /HPF (ref 0–2)
SODIUM SERPL-SCNC: 141 MMOL/L (ref 133–144)
SOURCE: ABNORMAL
SP GR UR STRIP: 1.01 (ref 1–1.03)
SQUAMOUS #/AREA URNS AUTO: 5 /HPF (ref 0–1)
UROBILINOGEN UR STRIP-MCNC: NORMAL MG/DL (ref 0–2)
WBC # BLD AUTO: 7.9 10E9/L (ref 4–11)
WBC #/AREA URNS AUTO: 9 /HPF (ref 0–5)

## 2020-01-06 PROCEDURE — 73130 X-RAY EXAM OF HAND: CPT | Mod: RT

## 2020-01-06 PROCEDURE — 87086 URINE CULTURE/COLONY COUNT: CPT | Performed by: EMERGENCY MEDICINE

## 2020-01-06 PROCEDURE — 25800030 ZZH RX IP 258 OP 636: Performed by: HOSPITALIST

## 2020-01-06 PROCEDURE — 96374 THER/PROPH/DIAG INJ IV PUSH: CPT

## 2020-01-06 PROCEDURE — G0378 HOSPITAL OBSERVATION PER HR: HCPCS

## 2020-01-06 PROCEDURE — 99220 ZZC INITIAL OBSERVATION CARE,LEVL III: CPT | Performed by: HOSPITALIST

## 2020-01-06 PROCEDURE — 70450 CT HEAD/BRAIN W/O DYE: CPT

## 2020-01-06 PROCEDURE — 25000132 ZZH RX MED GY IP 250 OP 250 PS 637: Mod: GY | Performed by: EMERGENCY MEDICINE

## 2020-01-06 PROCEDURE — 74177 CT ABD & PELVIS W/CONTRAST: CPT

## 2020-01-06 PROCEDURE — 99291 CRITICAL CARE FIRST HOUR: CPT | Mod: 25

## 2020-01-06 PROCEDURE — 25000125 ZZHC RX 250: Performed by: EMERGENCY MEDICINE

## 2020-01-06 PROCEDURE — 85025 COMPLETE CBC W/AUTO DIFF WBC: CPT | Performed by: EMERGENCY MEDICINE

## 2020-01-06 PROCEDURE — 80053 COMPREHEN METABOLIC PANEL: CPT | Performed by: EMERGENCY MEDICINE

## 2020-01-06 PROCEDURE — 87186 SC STD MICRODIL/AGAR DIL: CPT | Performed by: EMERGENCY MEDICINE

## 2020-01-06 PROCEDURE — 83690 ASSAY OF LIPASE: CPT | Performed by: EMERGENCY MEDICINE

## 2020-01-06 PROCEDURE — 25000128 H RX IP 250 OP 636: Performed by: HOSPITALIST

## 2020-01-06 PROCEDURE — 25000132 ZZH RX MED GY IP 250 OP 250 PS 637: Mod: GY | Performed by: HOSPITALIST

## 2020-01-06 PROCEDURE — 87088 URINE BACTERIA CULTURE: CPT | Performed by: EMERGENCY MEDICINE

## 2020-01-06 PROCEDURE — 81001 URINALYSIS AUTO W/SCOPE: CPT | Performed by: EMERGENCY MEDICINE

## 2020-01-06 PROCEDURE — 25000128 H RX IP 250 OP 636: Performed by: EMERGENCY MEDICINE

## 2020-01-06 RX ORDER — QUETIAPINE FUMARATE 25 MG/1
25 TABLET, FILM COATED ORAL ONCE
Status: COMPLETED | OUTPATIENT
Start: 2020-01-06 | End: 2020-01-06

## 2020-01-06 RX ORDER — CEFTRIAXONE 1 G/1
1 INJECTION, POWDER, FOR SOLUTION INTRAMUSCULAR; INTRAVENOUS EVERY 24 HOURS
Status: DISCONTINUED | OUTPATIENT
Start: 2020-01-06 | End: 2020-01-09

## 2020-01-06 RX ORDER — IOPAMIDOL 755 MG/ML
65 INJECTION, SOLUTION INTRAVASCULAR ONCE
Status: COMPLETED | OUTPATIENT
Start: 2020-01-06 | End: 2020-01-06

## 2020-01-06 RX ORDER — AMOXICILLIN 250 MG
1 CAPSULE ORAL 2 TIMES DAILY
Status: DISCONTINUED | OUTPATIENT
Start: 2020-01-06 | End: 2020-01-14 | Stop reason: HOSPADM

## 2020-01-06 RX ORDER — AMOXICILLIN 250 MG
1 CAPSULE ORAL 2 TIMES DAILY PRN
Status: DISCONTINUED | OUTPATIENT
Start: 2020-01-06 | End: 2020-01-14 | Stop reason: HOSPADM

## 2020-01-06 RX ORDER — SODIUM CHLORIDE 9 MG/ML
INJECTION, SOLUTION INTRAVENOUS CONTINUOUS
Status: ACTIVE | OUTPATIENT
Start: 2020-01-06 | End: 2020-01-07

## 2020-01-06 RX ORDER — ACETAMINOPHEN 500 MG
500 TABLET ORAL EVERY 6 HOURS PRN
COMMUNITY
End: 2020-05-15

## 2020-01-06 RX ORDER — POLYETHYLENE GLYCOL 3350 17 G/17G
17 POWDER, FOR SOLUTION ORAL ONCE
Status: DISCONTINUED | OUTPATIENT
Start: 2020-01-06 | End: 2020-01-11 | Stop reason: CLARIF

## 2020-01-06 RX ORDER — QUETIAPINE FUMARATE 25 MG/1
25 TABLET, FILM COATED ORAL 2 TIMES DAILY
Status: DISCONTINUED | OUTPATIENT
Start: 2020-01-06 | End: 2020-01-07

## 2020-01-06 RX ORDER — QUETIAPINE FUMARATE 25 MG/1
25 TABLET, FILM COATED ORAL 2 TIMES DAILY
Status: ON HOLD | COMMUNITY
End: 2020-01-14

## 2020-01-06 RX ORDER — ONDANSETRON 2 MG/ML
4 INJECTION INTRAMUSCULAR; INTRAVENOUS EVERY 6 HOURS PRN
Status: DISCONTINUED | OUTPATIENT
Start: 2020-01-06 | End: 2020-01-14 | Stop reason: HOSPADM

## 2020-01-06 RX ORDER — LEVOTHYROXINE SODIUM 75 UG/1
75 TABLET ORAL DAILY
COMMUNITY
End: 2020-03-23

## 2020-01-06 RX ORDER — BISACODYL 10 MG
10 SUPPOSITORY, RECTAL RECTAL DAILY PRN
Status: DISCONTINUED | OUTPATIENT
Start: 2020-01-06 | End: 2020-01-14 | Stop reason: HOSPADM

## 2020-01-06 RX ORDER — ACETAMINOPHEN 650 MG/1
650 SUPPOSITORY RECTAL EVERY 4 HOURS PRN
Status: DISCONTINUED | OUTPATIENT
Start: 2020-01-06 | End: 2020-01-14 | Stop reason: HOSPADM

## 2020-01-06 RX ORDER — LEVOTHYROXINE SODIUM 75 UG/1
75 TABLET ORAL DAILY
Status: DISCONTINUED | OUTPATIENT
Start: 2020-01-07 | End: 2020-01-14 | Stop reason: HOSPADM

## 2020-01-06 RX ORDER — NALOXONE HYDROCHLORIDE 0.4 MG/ML
.1-.4 INJECTION, SOLUTION INTRAMUSCULAR; INTRAVENOUS; SUBCUTANEOUS
Status: DISCONTINUED | OUTPATIENT
Start: 2020-01-06 | End: 2020-01-14 | Stop reason: HOSPADM

## 2020-01-06 RX ORDER — QUETIAPINE FUMARATE 25 MG/1
25 TABLET, FILM COATED ORAL 2 TIMES DAILY PRN
Status: DISCONTINUED | OUTPATIENT
Start: 2020-01-06 | End: 2020-01-14 | Stop reason: HOSPADM

## 2020-01-06 RX ORDER — UBIDECARENONE 75 MG
100 CAPSULE ORAL DAILY
COMMUNITY
End: 2020-02-12

## 2020-01-06 RX ORDER — VIT A/VIT C/VIT E/ZINC/COPPER 4296-226
1 CAPSULE ORAL 2 TIMES DAILY
COMMUNITY
End: 2020-02-14

## 2020-01-06 RX ORDER — METOPROLOL SUCCINATE 25 MG/1
25 TABLET, EXTENDED RELEASE ORAL DAILY
Status: DISCONTINUED | OUTPATIENT
Start: 2020-01-07 | End: 2020-01-14 | Stop reason: HOSPADM

## 2020-01-06 RX ORDER — ACETAMINOPHEN 325 MG/1
650 TABLET ORAL EVERY 4 HOURS PRN
Status: DISCONTINUED | OUTPATIENT
Start: 2020-01-06 | End: 2020-01-14 | Stop reason: HOSPADM

## 2020-01-06 RX ORDER — AMOXICILLIN 250 MG
2 CAPSULE ORAL 2 TIMES DAILY PRN
Status: DISCONTINUED | OUTPATIENT
Start: 2020-01-06 | End: 2020-01-14 | Stop reason: HOSPADM

## 2020-01-06 RX ORDER — ONDANSETRON 4 MG/1
4 TABLET, ORALLY DISINTEGRATING ORAL EVERY 6 HOURS PRN
Status: DISCONTINUED | OUTPATIENT
Start: 2020-01-06 | End: 2020-01-14 | Stop reason: HOSPADM

## 2020-01-06 RX ORDER — ACETAMINOPHEN 500 MG
500 TABLET ORAL EVERY 6 HOURS PRN
Status: DISCONTINUED | OUTPATIENT
Start: 2020-01-06 | End: 2020-01-06

## 2020-01-06 RX ORDER — AMOXICILLIN 250 MG
2 CAPSULE ORAL 2 TIMES DAILY
Status: DISCONTINUED | OUTPATIENT
Start: 2020-01-06 | End: 2020-01-14 | Stop reason: HOSPADM

## 2020-01-06 RX ADMIN — SODIUM CHLORIDE 60 ML: 9 INJECTION, SOLUTION INTRAVENOUS at 16:39

## 2020-01-06 RX ADMIN — CEFTRIAXONE SODIUM 1 G: 1 INJECTION, POWDER, FOR SOLUTION INTRAMUSCULAR; INTRAVENOUS at 21:34

## 2020-01-06 RX ADMIN — SODIUM CHLORIDE: 9 INJECTION, SOLUTION INTRAVENOUS at 22:21

## 2020-01-06 RX ADMIN — APIXABAN 2.5 MG: 2.5 TABLET, FILM COATED ORAL at 21:34

## 2020-01-06 RX ADMIN — QUETIAPINE 25 MG: 25 TABLET ORAL at 16:23

## 2020-01-06 RX ADMIN — TRAZODONE HYDROCHLORIDE 150 MG: 100 TABLET ORAL at 21:24

## 2020-01-06 RX ADMIN — IOPAMIDOL 65 ML: 755 INJECTION, SOLUTION INTRAVENOUS at 16:39

## 2020-01-06 RX ADMIN — QUETIAPINE 25 MG: 25 TABLET ORAL at 18:39

## 2020-01-06 RX ADMIN — ACETAMINOPHEN 650 MG: 325 TABLET, FILM COATED ORAL at 21:24

## 2020-01-06 RX ADMIN — MELATONIN 5 MG TABLET 5 MG: at 21:41

## 2020-01-06 RX ADMIN — SENNOSIDES AND DOCUSATE SODIUM 1 TABLET: 8.6; 5 TABLET ORAL at 21:25

## 2020-01-06 RX ADMIN — QUETIAPINE 25 MG: 25 TABLET ORAL at 21:25

## 2020-01-06 NOTE — PROGRESS NOTES
I was asked to see this patient's family member who is at bedside.  I went to this patients room and Morteza the step daughter wanted to talk to me outside in the hallway.  In our discussion Morteza said that this patient lives at [facility] and has a care giver from 8 am-8pm everyday.  Morteza's sister has been staying over night with this patient and their father. This patient has been peeing and pooping in places in the apt.  The family is wondering if this patient needs memory care and stated that [facility] has Memory Care.  The obstacle that [facility] is having currently is with this patients son who is a radiologist.  She would like him to be more involved with what this patient needs.  Morteza would like this patient to go to memory care.  I encouraged her to talk with [facility] staff about that arrangement. It is not determined yet if this patient needs to be admitted. If this patient is discharged she can return to [facility] and Morteza is aware of this but is insistent that this patient be admitted for a full neurological work up for Alzheimer's.     I updated the ER provider of the above.

## 2020-01-06 NOTE — ED PROVIDER NOTES
History     Chief Complaint:  Abdominal Pain and Social Work Services      HPI   Annabel May is a 85 year old female with a history of atrial fibrillation anticoagulated on Eliquis who presents with abdominal pain and behavioral changes.  She has had a long history of suspected dementia.  She is currently been living in assisted living with her .  She has began requiring multiple family members to provide care for her 24 hours a day.  She has been grabbing at her stool from her rectum.  It is thought to be that she has been trying to disimpact herself for having some pain in the rectal area.  She is unable to provide any history herself.    Discussed care with patient's stepdaughter and son.  I spoke with son who is power of  by telephone.    She developed bruising on her right hand several days ago that is spread.  No known falls.    Allergies:  Fentanyl   Midazolam   Vecuronium     Medications:    Fosamax   Eliquis   Fiorinal   Levothyroxine   Lisinopril   Metoprolol   Desyrel       Past Medical History:    Hyperlipidemia   Cardiac dysrhythmia   Memory loss   Hypothyroid   Hypertension   Arthritis   Breast Cancer   Migraines   Syncope  PONV     Past Surgical History:    Appendectomy   Breast surgery   Cholecystectomy   Partial Thyroidectomy   Glaucoma surgery   Head and neck surgery   Hysterectomy   Cornea implant right   Pacemaker implant     Family History:   No past pertinent family history.    Social History:  Presents to the ED with step-daughter at the bedside  Tobacco Use: no  Alcohol Use: yes; occasionally  Drug Use: no  Marital Status:   [2]    Review of Systems   Unable to perform ROS: Dementia       Physical Exam     Patient Vitals for the past 24 hrs:   BP Temp Temp src Pulse Resp SpO2   01/06/20 2209 -- -- -- -- 20 --   01/06/20 1434 (!) 160/81 98.1  F (36.7  C) Temporal 80 20 99 %       Physical Exam  Eyes:  The pupils are equal and round    Conjunctivae and sclerae are  normal  ENT:    The nose is normal    Pinnae are normal    The oropharynx is normal  CV:  Regular rate and rhythm     No edema  Resp:  Lungs are clear    Non-labored    No rales    No wheezing   GI:  Abdomen is soft with tenderness on the left abdomen, there is no rigidity    No distension    No rebound tenderness   Rectal: No fissures or masses noted  MS:  Normal muscular tone    No asymmetric leg swelling    Bruising on right hand, tender to palpation  Skin:  No rash or acute skin lesions noted  Neuro:   Awake, alert.      Speech is normal and fluent.    Face is symmetric.     Moves all extremities      Emergency Department Course   Imaging:  XR Hand Right G/E 3 Views   Final Result   IMPRESSION: No acute-appearing bony abnormality. Generalized bony   demineralization. Advanced osteoarthritis at the first carpometacarpal   joint with milder osteoarthritis at the triscaphe joint.   Osteoarthritis at multiple metacarpophalangeal and interphalangeal   joints is also seen. The mid diaphysis of the proximal phalanx of the   fourth and fifth fingers are obscured by overlying metallic rings.      JETT DEJESUS MD      CT Head w/o Contrast   Final Result   IMPRESSION: Diffuse cerebral volume loss and cerebral white matter   changes consistent with chronic small vessel ischemic disease. No   evidence for acute intracranial pathology. Probable chronic ischemic   infarct involving the right frontal lobe and right insular cortex.         Radiation dose for this scan was reduced using automated exposure   control, adjustment of the mA and/or kV according to patient size, or   iterative reconstruction technique      DOMONIQUE DUBOIS MD      CT Abdomen Pelvis w Contrast   Final Result   IMPRESSION:   1. No acute pathology in the abdomen or pelvis.   2. Large amount of stool throughout the colon, nonspecific, can be   seen with constipation.      ALEXANDREA SCOTT MD           Laboratory:  Labs Ordered and Resulted from Time of  ED Arrival Up to the Time of Departure from the ED   COMPREHENSIVE METABOLIC PANEL - Abnormal; Notable for the following components:       Result Value    Chloride 111 (*)     GFR Estimate 50 (*)     GFR Estimate If Black 58 (*)     All other components within normal limits   UA MACROSCOPIC WITH REFLEX TO MICRO AND CULTURE - Abnormal; Notable for the following components:    Ketones Urine 10 (*)     pH Urine 8.0 (*)     Leukocyte Esterase Urine Large (*)     RBC Urine 13 (*)     WBC Urine 9 (*)     Squamous Epithelial /HPF Urine 5 (*)     Mucous Urine Present (*)     All other components within normal limits   CBC WITH PLATELETS DIFFERENTIAL   LIPASE   PERIPHERAL IV CATHETER   FREE WATER      Interventions:  Medications   QUEtiapine (SEROquel) tablet 25 mg (25 mg Oral Given 1/6/20 1623)   Saline Flush - CT (60 mLs Intravenous Given 1/6/20 1639)   iopamidol (ISOVUE-370) solution 65 mL (65 mLs Intravenous Given 1/6/20 1639)   QUEtiapine (SEROquel) tablet 25 mg (25 mg Oral Given 1/6/20 1839)           Impression & Plan      Medical Decision Making:  Annabel May is a 85 year old female who presents the emergency department with behavioral disturbances and abdominal pain with suspected constipation.  Patient does not able to provide any history due to presumed advanced dementia.  Began having more behavioral disturbances at home in her assisted living facility.  She is currently on blood thinner.  No known trauma although she has bruising on her right hand.  X-ray of her hand was negative.  CT scan of her abdomen pelvis was obtained due to concerns for abdominal pain, rectal pain and possible obstruction.  CT scan revealed quite a bit of stool through her colon but no significant amount of stool in her rectal vault.  CT scan otherwise did not reveal any cause of her pain.  Rectal exam did not reveal any large hemorrhoids or fissures.  The patient here was having anxiety.  She is given 2 doses of Seroquel.  After  discussion with family it was felt most appropriate to have her admitted to observation likely get placed in memory care where there is more help.  Patient admitted to the hospitalist for further evaluation.    Diagnosis:    ICD-10-CM    1. Constipation, unspecified constipation type K59.00    2. Cognitive and behavioral changes R41.89     R46.89        Disposition:  Admitted to observation    Andreea Licea  1/6/2020    EMERGENCY DEPARTMENT       Jay Lazaro MD  01/06/20 3809
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
## 2020-01-07 PROCEDURE — 99221 1ST HOSP IP/OBS SF/LOW 40: CPT | Performed by: PSYCHIATRY & NEUROLOGY

## 2020-01-07 PROCEDURE — G0378 HOSPITAL OBSERVATION PER HR: HCPCS

## 2020-01-07 PROCEDURE — 25000128 H RX IP 250 OP 636: Performed by: HOSPITALIST

## 2020-01-07 PROCEDURE — 99207 ZZC CDG-CODE CATEGORY CHANGED: CPT | Performed by: INTERNAL MEDICINE

## 2020-01-07 PROCEDURE — 40000893 ZZH STATISTIC PT IP EVAL DEFER

## 2020-01-07 PROCEDURE — 96376 TX/PRO/DX INJ SAME DRUG ADON: CPT

## 2020-01-07 PROCEDURE — 25800030 ZZH RX IP 258 OP 636: Performed by: HOSPITALIST

## 2020-01-07 PROCEDURE — 25000132 ZZH RX MED GY IP 250 OP 250 PS 637: Mod: GY | Performed by: HOSPITALIST

## 2020-01-07 PROCEDURE — 99225 ZZC SUBSEQUENT OBSERVATION CARE,LEVEL II: CPT | Performed by: INTERNAL MEDICINE

## 2020-01-07 RX ADMIN — LEVOTHYROXINE SODIUM 75 MCG: 75 TABLET ORAL at 08:27

## 2020-01-07 RX ADMIN — METOPROLOL SUCCINATE 25 MG: 25 TABLET, EXTENDED RELEASE ORAL at 08:27

## 2020-01-07 RX ADMIN — TRAZODONE HYDROCHLORIDE 150 MG: 100 TABLET ORAL at 22:22

## 2020-01-07 RX ADMIN — MELATONIN 5 MG TABLET 5 MG: at 22:22

## 2020-01-07 RX ADMIN — SENNOSIDES AND DOCUSATE SODIUM 1 TABLET: 8.6; 5 TABLET ORAL at 08:26

## 2020-01-07 RX ADMIN — APIXABAN 2.5 MG: 2.5 TABLET, FILM COATED ORAL at 08:27

## 2020-01-07 RX ADMIN — SENNOSIDES AND DOCUSATE SODIUM 2 TABLET: 8.6; 5 TABLET ORAL at 20:32

## 2020-01-07 RX ADMIN — APIXABAN 2.5 MG: 2.5 TABLET, FILM COATED ORAL at 20:32

## 2020-01-07 RX ADMIN — CEFTRIAXONE SODIUM 1 G: 1 INJECTION, POWDER, FOR SOLUTION INTRAMUSCULAR; INTRAVENOUS at 20:29

## 2020-01-07 RX ADMIN — QUETIAPINE 25 MG: 25 TABLET ORAL at 08:27

## 2020-01-07 RX ADMIN — SODIUM CHLORIDE: 9 INJECTION, SOLUTION INTRAVENOUS at 12:31

## 2020-01-07 RX ADMIN — BISACODYL 10 MG: 10 SUPPOSITORY RECTAL at 10:55

## 2020-01-07 NOTE — PLAN OF CARE
Discharge Planner OT   Patient plan for discharge: not stated  Current status: OT orders received and chart reviewed. PTA, pt was living at the [facility], had a caregiver 8AM-8PM and a daughter slept overnight with her per chart. Pt was up with SBA at baseline. Currently requires SBA for gait in room and transfers with nursing. Pt was observed ambulating in the halls with nursing with SBA and was moving safely. No skilled IP OT needs as pt has A w/ ADL's from her caregiver at baseline. OT order completed.  Barriers to return to prior living situation: Family unable to meet patient's needs in her current apartment at the [facility].  Recommendations for discharge: Higher level of care such as memory care  Rationale for recommendations: Pt's admission was due to dementia with behaviors and did not affect her ADL's/mobility. Pt has no skilled IP OT needs. Defer to  for placement in a higher level of care.       Entered by: Claudine Ellisno 01/07/2020 4:01 PM

## 2020-01-07 NOTE — PROGRESS NOTES
RECEIVING UNIT ED HANDOFF REVIEW    ED Nurse Handoff Report was reviewed by: Pamela Curran RN on January 6, 2020 at 8:05 PM

## 2020-01-07 NOTE — PLAN OF CARE
PT:  Discharge Planner PT   Patient plan for discharge: Did not state  Current status: Orders received, chart reviewed, spoke with RN. Pt admitted under observation status with dementia with agitation and behavioral disturbances. Prior to admit pt was living at the [facility], had a caregiver 8AM-8PM and a daughter slept overnight with her per chart. Pt was up with SBA at baseline. Currently requires SBA for gait in room and transfers with nursing. Pt was observed ambulating in the halls with nursing with SBA and was moving safely.  Barriers to return to prior living situation: Family unable to meet patient's needs in her current apartment at the [facility].  Recommendations for discharge: Pt will need to live in a higher level of care with more assistance and supervision. Would benefit from memory care.  Rationale for recommendations: Pt's admission was due to dementia with behaviors and did not affect her mobility. Pt has no skilled IP PT needs at this time. Defer to  for placement in a higher level of care.       Entered by: Reema Dewitt 01/07/2020 2:18 PM
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
## 2020-01-07 NOTE — H&P
Admitted:     01/06/2020      PRIMARY CARE PHYSICIAN:  Mynor Alvarez MD      CHIEF COMPLAINT:  Dementia with agitation.      HISTORY OF PRESENT ILLNESS:  History is limited from the patient because of her dementia.  History was reviewed from her stepdaughter present in the room and also her son over the phone, who is a radiologist.  Ms. Annabel May is an 85-year-old female with severe dementia, hypertension, hypothyroidism, and glaucoma who was brought to ER for agitation issues.  She lives with her  at [facility] assisted living facility, but it seems like her memory issues have been progressively worse, and now she does not recognize her  and it has been getting difficult for family to take care of her.  Difficult to assess if she has any particular symptoms.  She was recently started on Seroquel.  She was taken to primary care provider today and was sent to ER for further evaluation.  Per family, she has not been having any fever, chills or rigors.  No chest pain or shortness of breath.  She apparently has been reaching out to her rectal area constantly.  I am not sure if she has been having irregular bowel or bladder movements.  In the ER, she was seen by Dr. Lazaro.  Initial workup was noted with slightly abnormal UA and CT with some large stools.  Hospitalist was requested admission for observation.      REVIEW OF SYSTEMS:  A limited 10-point review of systems was done and was negative apart from those mentioned in the history of present illness.      PAST MEDICAL HISTORY:   1.  Hypertension.   2.  Hypothyroidism.   3.  Severe dementia.   4.  Glaucoma.   5.  Atrial fibrillation, on anticoagulation.      MEDICATIONS PRIOR TO ADMISSION:  Medications Prior to Admission   Medication Sig Dispense Refill Last Dose     acetaminophen (TYLENOL) 500 MG tablet Take 500 mg by mouth every 6 hours as needed for mild pain   prn     alendronate (FOSAMAX) 70 MG tablet Take 70 mg by mouth every 7 days sunday    1/5/2020     apixaban ANTICOAGULANT (ELIQUIS) 2.5 MG tablet Take 2.5 mg by mouth 2 times daily    1/6/2020 at am     brinzolamide (AZOPT) 1 % ophthalmic susp Place 1 drop into both eyes 2 times daily    1/6/2020 at am     cyanocobalamin (VITAMIN B-12) 100 MCG tablet Take 100 mcg by mouth daily   1/6/2020 at am     levothyroxine (SYNTHROID/LEVOTHROID) 75 MCG tablet Take 75 mcg by mouth daily   1/6/2020 at am     melatonin 5 MG tablet Take 5 mg by mouth At Bedtime   1/5/2020 at hs     metoprolol (TOPROL-XL) 25 MG 24 hr tablet Take 25 mg by mouth daily    1/6/2020 at am     Multiple Vitamins-Minerals (PRESERVISION AREDS) CAPS Take 1 capsule by mouth 2 times daily   1/6/2020 at am     QUEtiapine (SEROQUEL) 25 MG tablet Take 25 mg by mouth 2 times daily Takes 4-5 hours apart   1/6/2020 at am     travoprost, SOCO Free, (TRAVATAN Z) 0.004 % ophthalmic solution Place 1 drop into both eyes At Bedtime    1/5/2020 at hs     traZODone (DESYREL) 50 MG tablet Take 150 mg by mouth At Bedtime    1/5/2020 at hs          ALLERGIES:  TO FENTANYL, MIDAZOLAM, VECURONIUM.      SOCIAL HISTORY:  She resides at assisted living facility.  No smoking, alcohol or illicit drug use.      FAMILY HISTORY:  Reviewed and not pertinent to current presentation.      PHYSICAL EXAMINATION:   GENERAL:  The patient is conscious, alert, awake.  Seems oriented to self only, lying in bed.  Does appear slightly restless.   VITAL SIGNS:  Temperature 98.1, heart rate of 80, blood pressure 160/81, saturation 99% on room air.   HEENT:  Pupils are equal and reactive to light and accommodation.  Extraocular movements are intact.  Oral mucosa appears slightly dry.   NECK:  Supple, no raised JVD.   RESPIRATORY SYSTEM:  Lung sounds bilaterally clear to auscultation, no wheezes or crepitation.   CARDIOVASCULAR:  Normal S1, S2.  Regular rate and rhythm, no murmur.   ABDOMEN:  Soft, nontender.  No guarding, rigidity or rebound tenderness.   LOWER EXTREMITIES:  With no  edema.   NEUROLOGIC:  Difficult to assess full neurological exam because of her dementia, but she seems to be moving all her extremities equally and no focal weakness noted on limited exam.      LABORATORY AND IMAGING:  Reviewed in Epic.  CBC, CMP are mostly unremarkable.  UA with large leukocyte esterase, 9 WBC.  Urine culture is pending.  X-ray of right hand noted with no acute-appearing bony abnormality.  Did note advanced osteoarthritis changes.  CT abdomen and pelvis reviewed and noted with no acute pathology.  Did note large amount of stool throughout the colon.  CT head without contrast noted with no acute intracranial pathology.  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      ASSESSMENT AND PLAN:  Ms. Annabel May is an 85-year-old female with severe dementia, hypertension, hypothyroidism, glaucoma, and atrial fibrillation who was brought to ER today for dementia with agitation.     1.  Dementia with behavioral disturbances and agitation.    - We will admit her for observation.  It seems her memory issues have been worsening and she has been getting more agitated, getting difficult for family to control at the assisted living facility.  Will address probable urinary tract infection and constipation as below.  Will get PT/OT evaluation and  for disposition planning.     2.  Abnormal urinalysis, likely urinary tract infection.   - Difficult to assess her symptoms.  UA does look slightly abnormal.  We will start her on empiric Rocephin while awaiting urine culture.     3.  Constipation.    - CT abdomen, as noted, showed large stool, and I suspect constipation might be worsening her agitation.  We will start her on scheduled laxatives along with p.r.n.  We will give a dose of MiraLax tonight, and we will try to avoid narcotics.     4.  Hypertension.  Resume lisinopril and metoprolol.   5.  Atrial fibrillation.  Continue  metoprolol.  Continue Eliquis.   6.  Hypothyroidism.  Continue Synthroid.   7.  Anxiety, depression.  Continue trazodone.   8.  Glaucoma.  We will hold off on her eyedrops while under observation status.   9.  Deep venous thrombosis prophylaxis:  She will be on Eliquis for atrial fibrillation.      CODE STATUS:  This was discussed with family, and she will be DNR/DNI.         RENETTA SOTO MD             D: 2020   T: 2020   MT:       Name:     VANDANA CUNNINGHAM   MRN:      [id number]        Account:      [id number]   :      1934        Admitted:     2020                   Document: [id number]       cc: Mynor Alvarez MD

## 2020-01-07 NOTE — CONSULTS
Psychiatry consult:  Patient seen; full note to follow    Recommend increasing the dose of Seroquel to 37.5mg BID to address agitation and paranoia in the context of dementia. Timing of the second dose was also changed to be sooner to reflect her outpatient schedule.  Superimposed delirium may be complicated her presentation in the context of a suspected UTI and constipation. These issues are being address by her primary treatment team.    Please reconsult with psychiatry as needed.

## 2020-01-07 NOTE — PLAN OF CARE
DATE & TIME: 1/6/2020 evening              Cognitive Concerns/ Orientation : Alert to self only- hx alzheimers  BEHAVIOR & AGGRESSION TOOL COLOR: Green- can be yellow at times- ED reported pt was verbally aggressive and physically combative. Pt has been anxious but cooperative on this unit.  CIWA SCORE: n/a  ABNL VS/O2: Unable to get vital signs since admission to this unit- pt refuses  MOBILITY: SBA with GB  PAIN MANAGMENT: c/o perineal pain- skin looks good no redness/rash. Given Tylenol PRN x1 and positioned on side.  DIET: Reg- ate well  BOWEL/BLADDER: continent- incontinent at times per report  ABNL LAB/BG: none  DRAIN/DEVICES: PIV in R arm infusing NaCl 75mL/hr  TELEMETRY RHYTHM: n/a  SKIN: WDL ex bruising/dry skin  TESTS/PROCEDURES: none  D/C DAY/GOALS/PLACE: to memory care unit  OTHER IMPORTANT INFO: Pt anxious and confused, can be aggressive verbally and physically per ED report. Sitter at bedside.

## 2020-01-07 NOTE — PHARMACY-ADMISSION MEDICATION HISTORY
Pharmacy Medication History  Admission medication history interview status for the 1/6/2020  admission is complete. See EPIC admission navigator for prior to admission medications     Medication history sources: Patient's family/friend (stepdaughter who had written list)  Medication history source reliability: Good  Adherence assessment: Good    Significant changes made to the medication list:  Removed aspirin, fiorinal, dorzolamide 2%, lisinopril  Changed levothyroxine dose  Added Vit b12, AREDS, seroquel, melatonin, tylenol prn    Medication reconciliation completed by provider prior to medication history? No    Time spent in this activity: 20 min      Prior to Admission medications    Medication Sig Last Dose Taking? Auth Provider   acetaminophen (TYLENOL) 500 MG tablet Take 500 mg by mouth every 6 hours as needed for mild pain prn Yes Unknown, Entered By History   alendronate (FOSAMAX) 70 MG tablet Take 70 mg by mouth every 7 days sunday 1/5/2020 Yes Reported, Patient   apixaban ANTICOAGULANT (ELIQUIS) 2.5 MG tablet Take 2.5 mg by mouth 2 times daily  1/6/2020 at am Yes Reported, Patient   brinzolamide (AZOPT) 1 % ophthalmic susp Place 1 drop into both eyes 2 times daily  1/6/2020 at am Yes Reported, Patient   cyanocobalamin (VITAMIN B-12) 100 MCG tablet Take 100 mcg by mouth daily 1/6/2020 at am Yes Unknown, Entered By History   levothyroxine (SYNTHROID/LEVOTHROID) 75 MCG tablet Take 75 mcg by mouth daily 1/6/2020 at am Yes Unknown, Entered By History   melatonin 5 MG tablet Take 5 mg by mouth At Bedtime 1/5/2020 at hs Yes Unknown, Entered By History   metoprolol (TOPROL-XL) 25 MG 24 hr tablet Take 25 mg by mouth daily  1/6/2020 at am Yes Reported, Patient   Multiple Vitamins-Minerals (PRESERVISION AREDS) CAPS Take 1 capsule by mouth 2 times daily 1/6/2020 at am Yes Unknown, Entered By History   QUEtiapine (SEROQUEL) 25 MG tablet Take 25 mg by mouth 2 times daily Takes 4-5 hours apart 1/6/2020 at am Yes  Unknown, Entered By History   travoprost, SOCO Free, (TRAVATAN Z) 0.004 % ophthalmic solution Place 1 drop into both eyes At Bedtime  1/5/2020 at hs Yes Reported, Patient   traZODone (DESYREL) 50 MG tablet Take 150 mg by mouth At Bedtime  1/5/2020 at hs Yes Reported, Patient

## 2020-01-07 NOTE — PLAN OF CARE
"DATE & TIME: 1/7/20, AM shift     Cognitive Concerns/ Orientation : Alert to self only. Very confused and tearful at times. Asking about her family and why they are not here with her, asks about her  but does not remember his name.   BEHAVIOR & AGGRESSION TOOL COLOR: Green to yellow. Restless and agitated at times. Redirectable at times, needs a lot of reassurance and emotional support  CIWA SCORE: N/a    ABNL VS/O2: VSS on room air  MOBILITY: Up SBA and GB. Ambulated in the ramírez.   PAIN MANAGMENT: c/o abdominal pain and points at her rectum \"its there...dig it out\". Senna x 1 given, Suppository x 1 given, Pt was able to have very small BM. Abdomen is distended. Denies pain.   DIET: Regular  BOWEL/BLADDER: Incontinent of urine at times. Voiding.   ABNL LAB/BG: urine culture growing gram negative rods  DRAIN/DEVICES: PIV infusing at 75 ml/hr  TELEMETRY RHYTHM: N/a  SKIN: Bruised especially R hand/wrist area.   TESTS/PROCEDURES: N/a  D/C DAY/GOALS/PLACE: Pending. Continues on Seroquel and IV Rocephin.   OTHER IMPORTANT INFO: Sitter at bedside  "

## 2020-01-07 NOTE — PROGRESS NOTES
Admission    Patient arrives to room 624 via cart from ED.  Care plan note: Pt anxious and agitated upon arrival- calmed down with redirection and warm blankets.    Inpatient nursing criteria listed below were met:    PCD's Documented: No  Skin issues/needs documented :Yes  Isolation education started/completed NA  Patient allergies verified with patient: No  Verified completion of [place] Risk Assessment Tool:  Yes  Verified completion of Guardianship screening tool: Yes  Fall Prevention: Care plan updated, Education given and documented Yes  Care Plan initiated: Yes  Home medications documented in belongings flowsheet: Yes  Patient belongings documented in belongings flowsheet: No  Reminder note (belongings/ medications) placed in discharge instructions:No  Admission profile/ required documentation complete: No  Bedside Report Letter given and explained to patient Yes

## 2020-01-07 NOTE — PLAN OF CARE
DATE & TIME: 1/6/20, 4090 - 1993   Cognitive Concerns/ Orientation : A&O x self  nly, confused   BEHAVIOR & AGGRESSION TOOL COLOR: Green  CIWA SCORE: N/a   ABNL VS/O2: VSS on room air  MOBILITY: Up SBA and GB  PAIN MANAGMENT: Denied  DIET: Regular  BOWEL/BLADDER: Incontinent of urine at times  ABNL LAB/BG: N/a  DRAIN/DEVICES: PIV infusing at 75 ml/hr  TELEMETRY RHYTHM: N/a  SKIN: Bruised  TESTS/PROCEDURES: N/a  D/C DAY/GOALS/PLACE: Pending  OTHER IMPORTANT INFO: Sitter at bedside

## 2020-01-08 LAB
BACTERIA SPEC CULT: ABNORMAL
Lab: ABNORMAL
SPECIMEN SOURCE: ABNORMAL

## 2020-01-08 PROCEDURE — 25000128 H RX IP 250 OP 636: Performed by: INTERNAL MEDICINE

## 2020-01-08 PROCEDURE — 93005 ELECTROCARDIOGRAM TRACING: CPT

## 2020-01-08 PROCEDURE — 96375 TX/PRO/DX INJ NEW DRUG ADDON: CPT

## 2020-01-08 PROCEDURE — 25000132 ZZH RX MED GY IP 250 OP 250 PS 637: Mod: GY | Performed by: HOSPITALIST

## 2020-01-08 PROCEDURE — 99225 ZZC SUBSEQUENT OBSERVATION CARE,LEVEL II: CPT | Performed by: INTERNAL MEDICINE

## 2020-01-08 PROCEDURE — 25000132 ZZH RX MED GY IP 250 OP 250 PS 637: Mod: GY | Performed by: PSYCHIATRY & NEUROLOGY

## 2020-01-08 PROCEDURE — G0378 HOSPITAL OBSERVATION PER HR: HCPCS

## 2020-01-08 PROCEDURE — 25000128 H RX IP 250 OP 636: Performed by: HOSPITALIST

## 2020-01-08 PROCEDURE — 93010 ELECTROCARDIOGRAM REPORT: CPT | Performed by: INTERNAL MEDICINE

## 2020-01-08 PROCEDURE — 99232 SBSQ HOSP IP/OBS MODERATE 35: CPT | Performed by: PSYCHIATRY & NEUROLOGY

## 2020-01-08 PROCEDURE — 96376 TX/PRO/DX INJ SAME DRUG ADON: CPT

## 2020-01-08 RX ORDER — QUETIAPINE FUMARATE 50 MG/1
50 TABLET, FILM COATED ORAL
Status: DISCONTINUED | OUTPATIENT
Start: 2020-01-09 | End: 2020-01-14 | Stop reason: HOSPADM

## 2020-01-08 RX ORDER — HALOPERIDOL 5 MG/ML
1-2 INJECTION INTRAMUSCULAR EVERY 6 HOURS PRN
Status: DISCONTINUED | OUTPATIENT
Start: 2020-01-08 | End: 2020-01-14 | Stop reason: HOSPADM

## 2020-01-08 RX ORDER — BENZTROPINE MESYLATE 1 MG/1
1-2 TABLET ORAL 3 TIMES DAILY PRN
Status: DISCONTINUED | OUTPATIENT
Start: 2020-01-08 | End: 2020-01-14 | Stop reason: HOSPADM

## 2020-01-08 RX ADMIN — SENNOSIDES AND DOCUSATE SODIUM 2 TABLET: 8.6; 5 TABLET ORAL at 20:36

## 2020-01-08 RX ADMIN — APIXABAN 2.5 MG: 2.5 TABLET, FILM COATED ORAL at 20:36

## 2020-01-08 RX ADMIN — HALOPERIDOL LACTATE 1 MG: 5 INJECTION, SOLUTION INTRAMUSCULAR at 02:53

## 2020-01-08 RX ADMIN — QUETIAPINE 25 MG: 25 TABLET ORAL at 05:09

## 2020-01-08 RX ADMIN — QUETIAPINE 37.5 MG: 25 TABLET ORAL at 14:16

## 2020-01-08 RX ADMIN — TRAZODONE HYDROCHLORIDE 150 MG: 100 TABLET ORAL at 22:01

## 2020-01-08 RX ADMIN — QUETIAPINE 37.5 MG: 25 TABLET ORAL at 08:20

## 2020-01-08 RX ADMIN — CEFTRIAXONE SODIUM 1 G: 1 INJECTION, POWDER, FOR SOLUTION INTRAMUSCULAR; INTRAVENOUS at 20:34

## 2020-01-08 RX ADMIN — METOPROLOL SUCCINATE 25 MG: 25 TABLET, EXTENDED RELEASE ORAL at 13:21

## 2020-01-08 RX ADMIN — QUETIAPINE 12.5 MG: 25 TABLET, FILM COATED ORAL at 15:23

## 2020-01-08 RX ADMIN — HALOPERIDOL LACTATE 1 MG: 5 INJECTION, SOLUTION INTRAMUSCULAR at 04:20

## 2020-01-08 RX ADMIN — MELATONIN 5 MG TABLET 5 MG: at 22:01

## 2020-01-08 NOTE — PROGRESS NOTES
[facility]    Medicine Progress Note - Hospitalist Service       Date of Admission:  1/6/2020  Assessment & Plan   Annabel May is an 85-year-old female with severe dementia, hypertension, hypothyroidism, glaucoma, and atrial fibrillation who was brought to ER today for dementia with agitation.     Dementia with behavioral disturbances and agitation  It seems her memory issues have been worsening and she has been getting more agitated, getting difficult for family to control at the assisted living facility.   Suspected UTI may also be contributing  - PT/OT evaluation and  for disposition planning  - Scheduled Seroquel increased by psychiatry  - Seroquel PRN   - Re-consulted psychiatry as had worsening agitation over night and required Haldol     Abnormal urinalysis, likely urinary tract infection  Difficult to assess her symptoms.  UA does look slightly abnormal.   Urine culture growing 10-50k E coli that is sensitive to Ceftriaxone   - Continue to follow urine culture  - Continue Ceftriaxone     Constipation  Noted on CT scan with large stool.  It is possible this could be worsening her agitation.    - Continue scheduled laxatives along with PRN for now   - Enema given on 1/7    Hypertension  - PTA Lisinopril and metoprolol    Atrial fibrillation  - Continue metoprolol  - Continue Eliquis    Hypothyroidism  - Continue Synthroid    Anxiety, depression  - Continue trazodone    Glaucoma  We will hold off on her eyedrops while under observation status       Diet: Regular Diet Adult    DVT Prophylaxis: Eliquis  Garcia Catheter: not present  Code Status: DNR/DNI      Disposition Plan   Expected discharge: TBD, once agitation controlled.  Likely needs a new long term care facility.   Entered: Jesus Meyers DO 01/08/2020, 12:14 PM       The patient's care was discussed with the Bedside Nurse, Care Coordinator/ and Patient.    Jesus Meyers DO  Hospitalist  Service  [facility]    ______________________________________________________________________    Interval History   Patient seen and examined.  Continues to have issues with agitation.  Over night required IV haldol.  No fevers or chills.  No complaints of pain on my evaluation.  Breathing is non-labored     Data reviewed today: I reviewed all medications, new labs and imaging results over the last 24 hours. I personally reviewed no images or EKG's today.    Physical Exam   Vital Signs: Temp: 97.6  F (36.4  C) Temp src: Oral BP: (!) 140/83 Pulse: 80   Resp: 16 SpO2: 98 % O2 Device: None (Room air)    Weight: 0 lbs 0 oz  General Appearance: Resting in bed.  Still easily agitated  Respiratory: Clear to auscultation.  No respiratory distress  Cardiovascular: RRR.  No obvious murmurs  GI: Bowel sounds present.  Non-tender  Skin: No rashes.  No cyanosis  Other: Demented.  Oriented to person only      Data   Recent Labs   Lab 01/06/20  1530   WBC 7.9   HGB 14.7   MCV 94         POTASSIUM 3.8   CHLORIDE 111*   CO2 23   BUN 22   CR 1.02   ANIONGAP 7   AMERICO 9.9   GLC 93   ALBUMIN 4.4   PROTTOTAL 7.7   BILITOTAL 0.9   ALKPHOS 66   ALT 29   AST 31   LIPASE 244     No results found for this or any previous visit (from the past 24 hour(s)).

## 2020-01-08 NOTE — CONSULTS
[facility]  Psychiatry Consultation      Patient name: Annabel May   MRN: [id number]    Age: 85 year old    YOB: 1934    Identifying information:   The patient is a 85 year old White female who is currently admitted to the hospitalist service on unit 66.    Reason for consult: Assist in comanaging agitation associated with dementia.    History of present illness:   The patient has a history of a neurocognitive disorder who currently resides in an assisted living home with her .  She was brought to the emergency room for evaluation of recent worsening of her mental state involving more pronounced confusion, emotional lability, and episodes of agitation.  During her hospital course, there was suspicion for the presence of a urinary tract infection along with ongoing constipation which could be complicating her presentation.  Psychiatric consultation was requested to further assist in treating these episodes of agitation.  Records indicate that due to the recent decline in her cognitive state, it has been difficult to care for her in her current living arrangement.  On examination today, the patient was sitting in a chair in her room next to the window with staff at her side.  She seemed upset and fearful of her surroundings.  She reiterated her anxiety in an unfamiliar setting and around unfamiliar people.  She would like to return home however could not provide additional details regarding where she resides or whom she resides with.  She had difficulty identifying details related to her surroundings or reasons related to her admission.  She denied suicidal and homicidal thoughts.  She did not endorse any auditory or visual hallucinations today however endorse some paranoia as she questioned the intent of others and her safety in the current environment.    Psychiatric Review of Systems:    -- Depressive episode: She reported depressed mood secondary to an unfamiliar  environment and being isolated from her family.  No vegetative symptoms reported.  She denied suicidal and homicidal thoughts.  -- Tanvi:  denies symptoms  -- Psychosis: She made some paranoid references however did not identify any specific delusions and did not endorse auditory or visual hallucinations.  -- Anxiety: denies symptoms corresponding to TRUMAN or panic disorder  -- PTSD: denies symptoms  -- OCD: denies  symptoms  -- Eating disorder: denies symptoms    Psychiatric History:    No prior inpatient hospitalizations.  No prior suicide attempts.  She is currently prescribed a combination of Seroquel and trazodone by her outpatient provider.    Substance Use History:    Denies using illicit substances or alcohol corresponding to a diagnosis of abuse or dependence. No prior chemical dependency treatments reported.    Medical History:  Refer to the internal medicine notes which I reviewed.       Current Facility-Administered Medications:      acetaminophen (TYLENOL) Suppository 650 mg, 650 mg, Rectal, Q4H PRN, Unruly Faust MD     acetaminophen (TYLENOL) tablet 650 mg, 650 mg, Oral, Q4H PRN, Unruly Faust MD, 650 mg at 01/06/20 2124     apixaban ANTICOAGULANT (ELIQUIS) tablet 2.5 mg, 2.5 mg, Oral, BID, Unruly Faust MD, 2.5 mg at 01/07/20 2032     bisacodyl (DULCOLAX) Suppository 10 mg, 10 mg, Rectal, Daily PRN, Unruly Faust MD, 10 mg at 01/07/20 1055     cefTRIAXone (ROCEPHIN) 1 g vial to attach to  mL bag for ADULTS or NS 50 mL bag for PEDS, 1 g, Intravenous, Q24H, Unruly Faust MD, Last Rate: 200 mL/hr at 01/07/20 2029, 1 g at 01/07/20 2029     levothyroxine (SYNTHROID/LEVOTHROID) tablet 75 mcg, 75 mcg, Oral, Daily, Unruly Faust MD, 75 mcg at 01/07/20 0827     magnesium hydroxide (MILK OF MAGNESIA) suspension 30 mL, 30 mL, Oral, Daily PRN, Unruly Faust MD     melatonin tablet 1 mg, 1 mg, Oral, At Bedtime PRN, Unruly Faust MD     melatonin  "tablet 5 mg, 5 mg, Oral, At Bedtime, Unruly Faust MD, 5 mg at 01/07/20 2222     metoprolol succinate ER (TOPROL-XL) 24 hr tablet 25 mg, 25 mg, Oral, Daily, Unruly Faust MD, 25 mg at 01/07/20 0827     naloxone (NARCAN) injection 0.1-0.4 mg, 0.1-0.4 mg, Intravenous, Q2 Min PRN, Unruly Faust MD     ondansetron (ZOFRAN-ODT) ODT tab 4 mg, 4 mg, Oral, Q6H PRN **OR** ondansetron (ZOFRAN) injection 4 mg, 4 mg, Intravenous, Q6H PRN, Unruly Faust MD     polyethylene glycol (MIRALAX/GLYCOLAX) Packet 17 g, 17 g, Oral, Once, Unruly Faust MD     QUEtiapine (SEROquel) half-tab 37.5 mg, 37.5 mg, Oral, BID, Andish, Daniel HORNE MD     QUEtiapine (SEROquel) tablet 25 mg, 25 mg, Oral, BID PRN, Unruly Faust MD     senna-docusate (SENOKOT-S/PERICOLACE) 8.6-50 MG per tablet 1 tablet, 1 tablet, Oral, BID, 1 tablet at 01/07/20 0826 **OR** senna-docusate (SENOKOT-S/PERICOLACE) 8.6-50 MG per tablet 2 tablet, 2 tablet, Oral, BID, Unruly Faust MD, 2 tablet at 01/07/20 2032     senna-docusate (SENOKOT-S/PERICOLACE) 8.6-50 MG per tablet 1 tablet, 1 tablet, Oral, BID PRN **OR** senna-docusate (SENOKOT-S/PERICOLACE) 8.6-50 MG per tablet 2 tablet, 2 tablet, Oral, BID PRN, Unruly Faust MD     traZODone (DESYREL) tablet 150 mg, 150 mg, Oral, At Bedtime, Unruly Faust MD, 150 mg at 01/07/20 3800     Social History:  Refer to the psychosocial assessment completed by the .     Family History:    No knowledge of mental health conditions in the family.    Vital Signs:    B/P: 166/95, T: 97.3, P: 83, R: 18  Estimated body mass index is 20.36 kg/m  as calculated from the following:    Height as of 12/4/19: 1.702 m (5' 7\").    Weight as of 12/4/19: 59 kg (130 lb).       Mental status examination:  Appearance:  Alert, hair is slightly unkempt, dressed in hospital scrubs, sitting in a chair, belt restraint was visible.  Mild emotional distress.  Attitude: Guarded however " "attempts to be cooperative  Eye Contact: Intense at times  Mood: \"Scared\"  Affect: Mood congruent, anxious, slightly labile  Speech: Slightly shaky with occasional difficulty with word finding  Psychomotor Behavior:  No psychomotor abnormalities noted  Thought Process: Slightly disorganized  Associations: No loose associations  Thought Content: Mild paranoia, no evidence of auditory or visual hallucinations. Denies suicidal Ideations. Denies homicidal ideations.  Insight: Limited  Judgment: Limited  Oriented to: Person only  Attention Span and Concentration: Limited  Recent and Remote Memory: Impaired  Language: Appropriate based on interviewing  Fund of Knowledge: Not able to assess  Muscle Strength and Tone: Not able to assess  Gait and Station: Not able to assess       Diagnoses:    Neurocognitive disorder, moderate  Rule out a delirium secondary to another medical condition (possible UTI, constipation)     Recommendations:  Recommend increasing the dose of Seroquel to 37.5mg BID to address agitation and paranoia in the context of dementia. Timing of the second dose was also changed to be sooner to reflect her outpatient schedule.    Superimposed delirium may be complicating her presentation in the context of a suspected UTI and constipation. These issues are being address by her primary treatment team.     Please reconsult with psychiatry as needed.   "

## 2020-01-08 NOTE — PLAN OF CARE
DATE & TIME: 1/8/20 0700- 1930  Cognitive Concerns/ Orientation : Alert to self, confused. Impulsive. Agitated, crying, scared, wants to go home.   BEHAVIOR & AGGRESSION TOOL COLOR: Green/yellow.      CIWA SCORE: N/A               ABNL VS/O2: VSS , / 94  MOBILITY: SBA with gait belt   PAIN MANAGMENT: Denies   DIET: Regular   BOWEL/BLADDER: continent and incontinent, need encouragement  to use BR  ABNL LAB/BG: N/A   DRAIN/DEVICES: Peripheral IV SL left arm   TELEMETRY RHYTHM: N/A   SKIN: Bruising   TESTS/PROCEDURES: N/A   D/C DAY/GOALS/PLACE: Pending   OTHER IMPORTANT INFO: Sitter at bedside, patient can get impulsive.  DNR-DNI.  Abdomen is rounded/distended, positive for bowel sounds., poor appetite, on scheduled Seroquel  and   PRN haldol, refused to take  AM meds, psych following, increased PM seroquel dose to 50 mg.Seems more calm and relaxed this evening.

## 2020-01-08 NOTE — PROGRESS NOTES
INTERNAL MEDICINE UPDATE    Called re: increased agitation    Won't take PRN Seroquel.    PLAN:  - Add PRN IV Haldol.    Abdon Rojo Jr., MD  [phone] (p)  Text Page

## 2020-01-08 NOTE — CONSULTS
[facility]  Psychiatry Consultation - Follow-up note      Interim History:   Reason for consultation: Continued agitation.  Records indicate that overnight, the patient had an episode of agitation in the context of confusion and disorientation.  She refused to take oral medications and has an alternative, received 2 injections of Haldol totaling 2 mg.  Staff reports that today, the patient has been concerned regarding the whereabouts of her mother and father, verbalizing concern that they have been kidnapped and that she needs to find them.    On examination today, the patient was sitting in a chair and seemed to be enjoying her lunch.  She continued to verbalize concern regarding the whereabouts of her mother and father.  She seems to miss them very much and is worried about them.  She continues to appear confused and disoriented.  As a result, she has moments of heightened anxiety and sadness.    No medication side effects reported.  She denied auditory and visual hallucinations today.  No reports indicating suicidal or homicidal thoughts.           Medications:       apixaban ANTICOAGULANT  2.5 mg Oral BID     cefTRIAXone  1 g Intravenous Q24H     levothyroxine  75 mcg Oral Daily     melatonin  5 mg Oral At Bedtime     metoprolol succinate ER  25 mg Oral Daily     polyethylene glycol  17 g Oral Once     QUEtiapine  37.5 mg Oral BID     senna-docusate  1 tablet Oral BID    Or     senna-docusate  2 tablet Oral BID     traZODone  150 mg Oral At Bedtime          Allergies:     Allergies   Allergen Reactions     Fentanyl Nausea and Vomiting     Midazolam      Other reaction(s): *Unknown     Vecuronium Unknown     Son reports reaction was severe          Labs:   No results found for this or any previous visit (from the past 24 hour(s)).       Psychiatric Examination:     BP (!) 140/83 (BP Location: Left arm)   Pulse 80   Temp 97.6  F (36.4  C) (Oral)   Resp 16   SpO2 98%   Weight is 0 lbs  0 oz  There is no height or weight on file to calculate BMI.  Orthostatic Vitals     None            Appearance: awake, alert and adequately groomed  Attitude:  somewhat cooperative  Eye Contact:  fair; less intense today  Mood:  anxious and better;   Affect:  Mood congruent, appearing less labile, and less tense.  Overall she appeared more calm and comfortable today.    Speech:  clear, coherent  Psychomotor Behavior:  tremor observed , Mild  Throught Process:  linear  Associations:  no loose associations  Thought Content:  no evidence of suicidal ideation or homicidal ideation and no evidence of psychotic thought  Insight:  limited  Judgement:  limited  Oriented to:  Person only  Attention Span and Concentration:  limited  Recent and Remote Memory:  poor           DIagnoses:     Neurocognitive disorder, moderate  Rule out a delirium secondary to another medical condition (possible UTI, constipation)      Recommendations:     Noting no significant sedation today to the higher dose of Seroquel along with another incident of significant agitation overnight, I would recommend increasing the afternoon dose of Seroquel to 50 mg. Consider increasing Seroquel to TID dosing if agitation continues.      Using Haldol injection as needed for severe agitation when the patient is refusing additional oral Seroquel would be reasonable to continue.  She appears to have responded adequately to 2 mg yesterday evening with no concerning side effects today.    EKG ordered to ensure stability of her QTc interval while utilizing antipsychotic medications.    Please reconsult with Psychiatry as needed.

## 2020-01-08 NOTE — PLAN OF CARE
Cognitive Concerns/ Orientation : Alert to self, confused. Impulsive.  Agitated, crying, scared, thought we were in her home and wanted us to leave starting at 0245.   BEHAVIOR & AGGRESSION TOOL COLOR: Green/yellow.  Code green called at 0250.  Patient with increased agitation, aggressive behavior, unable to redirect.  IV haldol given at 0250 and 0420 for a total of 2 mg.  PO seroquel given at 0510.    CIWA SCORE: N/A               ABNL VS/O2: VSS   MOBILITY: SBA with gait belt   PAIN MANAGMENT: Denies   DIET: Regular   BOWEL/BLADDER: Incontinent of bladder, patient has had senna, supps, tap water enema with little results   ABNL LAB/BG: N/A   DRAIN/DEVICES: Peripheral IV SL left arm   TELEMETRY RHYTHM: N/A   SKIN: Bruising   TESTS/PROCEDURES: N/A   D/C DAY/GOALS/PLACE: Pending   OTHER IMPORTANT INFO: Sitter at bedside, patient is impulsive.  DNR-DNI.  Abdomen is rounded/distended, positive for bowel sounds.

## 2020-01-08 NOTE — PLAN OF CARE
[id number]: Alert and oriented to self only. Sitter present at bedside. VSS. Denies pain. Refused enema from writer is ambulating hallways very well with sitter. Tolerating regular diet. Plan to possibly discharge tomorrow or Thursday. Will continue to monitor.

## 2020-01-08 NOTE — PROGRESS NOTES
SW:   D: Consult acknowledged. Awaiting patient stabilization from agitation. Currently has sitter.   P: Will continue to follow.     GODWIN Meléndez, [facility]  [phone]

## 2020-01-09 PROBLEM — F03.90 DEMENTIA (H): Status: ACTIVE | Noted: 2020-01-09

## 2020-01-09 LAB
CREAT SERPL-MCNC: 0.97 MG/DL (ref 0.52–1.04)
GFR SERPL CREATININE-BSD FRML MDRD: 53 ML/MIN/{1.73_M2}

## 2020-01-09 PROCEDURE — 36415 COLL VENOUS BLD VENIPUNCTURE: CPT | Performed by: HOSPITALIST

## 2020-01-09 PROCEDURE — 99232 SBSQ HOSP IP/OBS MODERATE 35: CPT | Performed by: INTERNAL MEDICINE

## 2020-01-09 PROCEDURE — 82565 ASSAY OF CREATININE: CPT | Performed by: HOSPITALIST

## 2020-01-09 PROCEDURE — 25000132 ZZH RX MED GY IP 250 OP 250 PS 637: Mod: GY | Performed by: HOSPITALIST

## 2020-01-09 PROCEDURE — 12000000 ZZH R&B MED SURG/OB

## 2020-01-09 PROCEDURE — 25000132 ZZH RX MED GY IP 250 OP 250 PS 637: Mod: GY | Performed by: PSYCHIATRY & NEUROLOGY

## 2020-01-09 PROCEDURE — G0378 HOSPITAL OBSERVATION PER HR: HCPCS

## 2020-01-09 RX ADMIN — QUETIAPINE FUMARATE 50 MG: 50 TABLET ORAL at 15:29

## 2020-01-09 RX ADMIN — QUETIAPINE 37.5 MG: 25 TABLET ORAL at 08:22

## 2020-01-09 RX ADMIN — MELATONIN 5 MG TABLET 5 MG: at 21:29

## 2020-01-09 RX ADMIN — TRAZODONE HYDROCHLORIDE 150 MG: 100 TABLET ORAL at 21:25

## 2020-01-09 RX ADMIN — LEVOTHYROXINE SODIUM 75 MCG: 75 TABLET ORAL at 06:38

## 2020-01-09 RX ADMIN — METOPROLOL SUCCINATE 25 MG: 25 TABLET, EXTENDED RELEASE ORAL at 08:23

## 2020-01-09 RX ADMIN — SENNOSIDES AND DOCUSATE SODIUM 2 TABLET: 8.6; 5 TABLET ORAL at 08:23

## 2020-01-09 RX ADMIN — SENNOSIDES AND DOCUSATE SODIUM 2 TABLET: 8.6; 5 TABLET ORAL at 21:25

## 2020-01-09 RX ADMIN — APIXABAN 2.5 MG: 2.5 TABLET, FILM COATED ORAL at 21:25

## 2020-01-09 RX ADMIN — APIXABAN 2.5 MG: 2.5 TABLET, FILM COATED ORAL at 08:23

## 2020-01-09 ASSESSMENT — ACTIVITIES OF DAILY LIVING (ADL)
ADLS_ACUITY_SCORE: 24
ADLS_ACUITY_SCORE: 24

## 2020-01-09 NOTE — PLAN OF CARE
DATE & TIME: 1/9/200700- 1930  Cognitive Concerns/ Orientation : Alert to self,  pleasentley confused.  Calm and   cooperative  BEHAVIOR & AGGRESSION TOOL COLOR: Green   CIWA SCORE: N/A               ABNL VS/O2: VSS 96% room air   MOBILITY: SBA   PAIN MANAGMENT: Denies   DIET: Regular   BOWEL/BLADDER: continent., needs encouragement to use BR   ABNL LAB/BG: N/A   DRAIN/DEVICES: Peripheral IV SL left arm   TELEMETRY RHYTHM: N/A   SKIN: Bruising   TESTS/PROCEDURES: N/A   D/C DAY/GOALS/PLACE: Pending   OTHER IMPORTANT INFO: DNR-DNI.  Sitter at bedside. Abdomen distended/rounded, bowel sounds hypoactive, appetite fair. Ambulated on the [address] many times. Steady on feet.sitter at bed side,. low urine OP,  Bladder scanned for zero,drinking fluids MD sybil aware.

## 2020-01-09 NOTE — PROGRESS NOTES
[facility]    Medicine Progress Note - Hospitalist Service       Date of Admission:  1/6/2020  Assessment & Plan   Annabel May is an 85-year-old female with severe dementia, hypertension, hypothyroidism, glaucoma, and atrial fibrillation who was brought to ER today for dementia with agitation.     Dementia with behavioral disturbances and agitation  It seems her memory issues have been worsening and she has been getting more agitated, getting difficult for family to control at the assisted living facility.   Suspected UTI may also be contributing.  Agitation improved with recent increase to Seroquel   - PT/OT evaluation and  for disposition planning  - Scheduled Seroquel increased by psychiatry to 50 mg in the evening and 37.5 mg in the AM   - Seroquel PRN     Abnormal urinalysis, likely urinary tract infection  Difficult to assess her symptoms.  UA does look slightly abnormal.   Urine culture growing 10-50k E coli that is sensitive to Ceftriaxone   - Finished a 3 day course of Ceftriaxone     Constipation  Noted on CT scan with large stool.  It is possible this could be worsening her agitation.    - Continue scheduled laxatives along with PRN for now   - Enema given on 1/7    Hypertension  - PTA Lisinopril and metoprolol    Atrial fibrillation  - Continue metoprolol  - Continue Eliquis    Hypothyroidism  - Continue Synthroid    Anxiety, depression  - Continue trazodone    Glaucoma  We will hold off on her eyedrops while under observation status       Diet: Regular Diet Adult    DVT Prophylaxis: Eliquis  Garcia Catheter: not present  Code Status: DNR/DNI      Disposition Plan   Expected discharge: TBD, patient is medically stable for discharge.  Now just need to find placement and be sitter free if required by the new facility.  Entered: Jesus Meyers DO 01/09/2020, 2:53 PM       The patient's care was discussed with the Bedside Nurse, Care Coordinator/ and  Patient.    Jesus Meyers DO  Hospitalist Service  [facility]    ______________________________________________________________________    Interval History   Patient seen and examined.  No acute events over night.  Patient is more calm and cooperative.  No complaints of pain or difficulty breathing.     Data reviewed today: I reviewed all medications, new labs and imaging results over the last 24 hours. I personally reviewed no images or EKG's today.    Physical Exam   Vital Signs: Temp: 98.7  F (37.1  C) Temp src: Oral BP: (!) 144/86(BP was taken 2x on both arms. Notified RN.) Pulse: 85   Resp: 16 SpO2: 97 % O2 Device: None (Room air)    Weight: 0 lbs 0 oz  General Appearance: Resting comfortably in chair.  NAD  Respiratory: Clear to auscultation.  No respiratory distress  Cardiovascular: RRR.  No murmurs  GI: Bowel sounds present.  Non-tender  Skin: No rashes.  No cyanosis  Other: Pleasantly demented.  Oriented to person only.  No edema     Data   Recent Labs   Lab 01/09/20  0903 01/06/20  1530   WBC  --  7.9   HGB  --  14.7   MCV  --  94   PLT  --  172   NA  --  141   POTASSIUM  --  3.8   CHLORIDE  --  111*   CO2  --  23   BUN  --  22   CR 0.97 1.02   ANIONGAP  --  7   AMERICO  --  9.9   GLC  --  93   ALBUMIN  --  4.4   PROTTOTAL  --  7.7   BILITOTAL  --  0.9   ALKPHOS  --  66   ALT  --  29   AST  --  31   LIPASE  --  244     No results found for this or any previous visit (from the past 24 hour(s)).

## 2020-01-09 NOTE — CONSULTS
Care Transition Initial Assessment - DIETER     Met with: Spoke with sonKelvin and step-daughter, Morteza    Active Problems:    Dementia with behavioral disturbance (H)    Dementia (H)       DATA  Lives With: facility resident, [facility] independent [facility]   Quality of Family Relationships: involved  Description of Support System: Involved  Who is your support system?: Children  Support Assessment: Adequate family and caregiver support.   Identified issues/concerns regarding health management: DIETER called [facility] and spoke with Paola, [phone]. Pt lives in an independent living apartment with her spouse. He has parkinson's. Neither receive services. Per RN CC note from 1/6, there was discussion about pt moving to memory care. Paola reports pt is not on the waitlist for memory care and they are currently full. DIETER spoke with both Kelvin and Morteza who have differing opinions about pt's disposition. Annabel appears to be Kelvin's mother while Morteza is her stepdaughter of 32 years. Initially, Kelvin prefered his mother return to [facility]. They have a caregiver with her and her spouse from 8am-8pm. Family occasionally spends the night. DIETER strongly encourged he get her on the wait-list for the memory care unit and hire a professional caregiver for the evening. DIETER then spoke with Morteza who was against this plan as she felt pt did not have enough support at [facility]. DIETER suggested they talk to each other. Kelvin then called DIETER back and agreed to TCU referrals until he can find a memory care assisted living facility. Pt is OBS and TCU would be private pay. Informed him of the down payment requirement for TCU, which he understood. Will send referrals to TCU.      Quality of Family Relationships: involved     ASSESSMENT  Cognitive Status: Confused, per nursing.  Concerns to be addressed: On-going discharge planning.     PLAN  Financial costs for the patient includes: Room and board at TCU.  Patient given options and  choices for discharge: Yes.  Patient/family is agreeable to the plan? YES  Patient Goals and Preferences: TCU.  Patient anticipates discharging to: TCU.    GODWIN Laughlin, [facility]  [phone]  [facility]

## 2020-01-09 NOTE — PLAN OF CARE
Cognitive Concerns/ Orientation : Alert to self, confused.  Pleasant and cooperative this evening  BEHAVIOR & AGGRESSION TOOL COLOR: Green   CIWA SCORE: N/A               ABNL VS/O2: VSS 96% room air   MOBILITY: SBA   PAIN MANAGMENT: Denies   DIET: Regular   BOWEL/BLADDER: Incontinent of urine   ABNL LAB/BG: N/A   DRAIN/DEVICES: Peripheral IV SL left arm   TELEMETRY RHYTHM: N/A   SKIN: Bruising   TESTS/PROCEDURES: N/A   D/C DAY/GOALS/PLACE: Pending   OTHER IMPORTANT INFO: DNR-DNI.  Sitter at bedside. Abdomen distended/rounded, bowel sounds hypoactive.

## 2020-01-10 LAB — INTERPRETATION ECG - MUSE: NORMAL

## 2020-01-10 PROCEDURE — 25000132 ZZH RX MED GY IP 250 OP 250 PS 637: Mod: GY | Performed by: PSYCHIATRY & NEUROLOGY

## 2020-01-10 PROCEDURE — 12000000 ZZH R&B MED SURG/OB

## 2020-01-10 PROCEDURE — 25000132 ZZH RX MED GY IP 250 OP 250 PS 637: Mod: GY | Performed by: HOSPITALIST

## 2020-01-10 PROCEDURE — 99231 SBSQ HOSP IP/OBS SF/LOW 25: CPT | Performed by: INTERNAL MEDICINE

## 2020-01-10 RX ADMIN — LEVOTHYROXINE SODIUM 75 MCG: 75 TABLET ORAL at 06:37

## 2020-01-10 RX ADMIN — QUETIAPINE 37.5 MG: 25 TABLET ORAL at 09:03

## 2020-01-10 RX ADMIN — SENNOSIDES AND DOCUSATE SODIUM 2 TABLET: 8.6; 5 TABLET ORAL at 22:17

## 2020-01-10 RX ADMIN — QUETIAPINE FUMARATE 50 MG: 50 TABLET ORAL at 16:04

## 2020-01-10 RX ADMIN — MELATONIN 5 MG TABLET 5 MG: at 22:14

## 2020-01-10 RX ADMIN — APIXABAN 2.5 MG: 2.5 TABLET, FILM COATED ORAL at 22:13

## 2020-01-10 RX ADMIN — SENNOSIDES AND DOCUSATE SODIUM 2 TABLET: 8.6; 5 TABLET ORAL at 09:04

## 2020-01-10 RX ADMIN — TRAZODONE HYDROCHLORIDE 150 MG: 100 TABLET ORAL at 22:15

## 2020-01-10 RX ADMIN — METOPROLOL SUCCINATE 25 MG: 25 TABLET, EXTENDED RELEASE ORAL at 09:04

## 2020-01-10 RX ADMIN — ACETAMINOPHEN 650 MG: 325 TABLET, FILM COATED ORAL at 11:56

## 2020-01-10 RX ADMIN — APIXABAN 2.5 MG: 2.5 TABLET, FILM COATED ORAL at 09:04

## 2020-01-10 ASSESSMENT — ACTIVITIES OF DAILY LIVING (ADL)
ADLS_ACUITY_SCORE: 15
ADLS_ACUITY_SCORE: 15
ADLS_ACUITY_SCORE: 13

## 2020-01-10 NOTE — PROGRESS NOTES
[facility]    Medicine Progress Note - Hospitalist Service       Date of Admission:  1/6/2020  Assessment & Plan   Annabel May is an 85-year-old female with severe dementia, hypertension, hypothyroidism, glaucoma, and atrial fibrillation who was brought to ER today for dementia with agitation.     Dementia with behavioral disturbances and agitation  It seems her memory issues have been worsening and she has been getting more agitated, getting difficult for family to control at the assisted living facility.   Suspected UTI may also be contributing.  Agitation improved with recent increase to Seroquel   - PT/OT evaluation and  for disposition planning  - Scheduled Seroquel increased by psychiatry to 50 mg in the evening and 37.5 mg in the AM   - Seroquel PRN     Abnormal urinalysis, likely urinary tract infection  Difficult to assess her symptoms.  UA does look slightly abnormal.   Urine culture growing 10-50k E coli that is sensitive to Ceftriaxone   - Finished a 3 day course of Ceftriaxone     Constipation  Noted on CT scan with large stool.  It is possible this could be worsening her agitation.    - Continue scheduled laxatives along with PRN for now   - Enema given on 1/7    Hypertension  - PTA Lisinopril and metoprolol    Atrial fibrillation  - Continue metoprolol  - Continue Eliquis    Hypothyroidism  - Continue Synthroid    Anxiety, depression  - Continue trazodone    Glaucoma  We will hold off on her eyedrops while under observation status       Diet: Regular Diet Adult    DVT Prophylaxis: Eliquis  Garcia Catheter: not present  Code Status: DNR/DNI      Disposition Plan   Expected discharge: TBD, recommended to transitional care unit once safe disposition plan/ TCU bed available.  Entered: Jesus Meyers DO 01/10/2020, 2:35 PM       The patient's care was discussed with the Bedside Nurse, Care Coordinator/ and Patient.    Jeuss Meyers DO  Hospitalist  Service  [facility]    ______________________________________________________________________    Interval History   Patient seen and examined.  No acute events over night.  No complaints of pain on my evaluation.  No difficulty breathing.  Tolerating diet     Data reviewed today: I reviewed all medications, new labs and imaging results over the last 24 hours. I personally reviewed no images or EKG's today.    Physical Exam   Vital Signs: Temp: 97.8  F (36.6  C) Temp src: Oral BP: 128/81 Pulse: 60   Resp: 16 SpO2: 95 % O2 Device: None (Room air)    Weight: 0 lbs 0 oz  General Appearance: Resting comfortably.  NAD   Respiratory: Clear to auscultation.  No respiratory distress   Cardiovascular: RRR.  No murmurs  GI: Bowel sounds present.  Non-tender  Skin: Bruising to the hand noted.  No rashes.  No cyanosis  Other: No edema.  Pleasantly demented     Data   Recent Labs   Lab 01/09/20  0903 01/06/20  1530   WBC  --  7.9   HGB  --  14.7   MCV  --  94   PLT  --  172   NA  --  141   POTASSIUM  --  3.8   CHLORIDE  --  111*   CO2  --  23   BUN  --  22   CR 0.97 1.02   ANIONGAP  --  7   AMERICO  --  9.9   GLC  --  93   ALBUMIN  --  4.4   PROTTOTAL  --  7.7   BILITOTAL  --  0.9   ALKPHOS  --  66   ALT  --  29   AST  --  31   LIPASE  --  244     No results found for this or any previous visit (from the past 24 hour(s)).

## 2020-01-10 NOTE — PLAN OF CARE
Cognitive Concerns/ Orientation : Alert to self, confused.  Pleasantly confused    BEHAVIOR & AGGRESSION TOOL COLOR: Green   CIWA SCORE: N/A               ABNL VS/O2: VSS 97% room air   MOBILITY: SBA with gait belt   PAIN MANAGMENT: Denies   DIET: Regular   BOWEL/BLADDER: Continent   ABNL LAB/BG: N/A   DRAIN/DEVICES: Peripheral IV SL left arm   TELEMETRY RHYTHM: N/A  SKIN: Bruising   TESTS/PROCEDURES: N/A   D/C DAY/GOALS/PLACE: Pending   OTHER IMPORTANT INFO: DNR-DNI.  Sitter at bedside, patient impulsive.  Abdomen distended/rounded.  Bowel sounds hypoactive.

## 2020-01-10 NOTE — PROGRESS NOTES
D: DIETER following for discharge planning.   I: Son Kelvin has contacted [facility]. They have an opening in the secured memory care unit. DIETER faxed a referral and spoke with Hollie Paige, [phone]. Kathy the nurse will come to assess Monday. Earliest they can accept is Tuesday. Family to make final arrangements such as deposit. Step-daughter Morteza will assist with moving belongings.   P: DIETER following.     GODWIN Laughlin, [facility]  [phone]  [facility]

## 2020-01-10 NOTE — PLAN OF CARE
Cognitive Concerns/ Orientation : Alert to self, confused.  Confused, tearful at times.  BEHAVIOR & AGGRESSION TOOL COLOR: Green   CIWA SCORE: N/A               ABNL VS/O2: VSS on r/a   MOBILITY: SBA    PAIN MANAGMENT: Denies   DIET: Regular   BOWEL/BLADDER: Continent   ABNL LAB/BG: N/A   DRAIN/DEVICES: Peripheral IV SL left arm   TELEMETRY RHYTHM: N/A  SKIN: Bruising   TESTS/PROCEDURES: N/A   D/C DAY/GOALS/PLACE: Plan is Tuesday to [facility], per CC.  OTHER IMPORTANT INFO:

## 2020-01-11 PROCEDURE — 12000000 ZZH R&B MED SURG/OB

## 2020-01-11 PROCEDURE — 25000132 ZZH RX MED GY IP 250 OP 250 PS 637: Mod: GY | Performed by: HOSPITALIST

## 2020-01-11 PROCEDURE — 25000132 ZZH RX MED GY IP 250 OP 250 PS 637: Mod: GY | Performed by: PSYCHIATRY & NEUROLOGY

## 2020-01-11 PROCEDURE — 99231 SBSQ HOSP IP/OBS SF/LOW 25: CPT | Performed by: INTERNAL MEDICINE

## 2020-01-11 RX ADMIN — SENNOSIDES AND DOCUSATE SODIUM 2 TABLET: 8.6; 5 TABLET ORAL at 08:31

## 2020-01-11 RX ADMIN — APIXABAN 2.5 MG: 2.5 TABLET, FILM COATED ORAL at 08:31

## 2020-01-11 RX ADMIN — ACETAMINOPHEN 650 MG: 325 TABLET, FILM COATED ORAL at 08:30

## 2020-01-11 RX ADMIN — APIXABAN 2.5 MG: 2.5 TABLET, FILM COATED ORAL at 21:03

## 2020-01-11 RX ADMIN — TRAZODONE HYDROCHLORIDE 150 MG: 100 TABLET ORAL at 21:02

## 2020-01-11 RX ADMIN — QUETIAPINE 37.5 MG: 25 TABLET ORAL at 08:31

## 2020-01-11 RX ADMIN — METOPROLOL SUCCINATE 25 MG: 25 TABLET, EXTENDED RELEASE ORAL at 08:30

## 2020-01-11 RX ADMIN — MELATONIN 5 MG TABLET 5 MG: at 21:03

## 2020-01-11 RX ADMIN — QUETIAPINE FUMARATE 50 MG: 50 TABLET ORAL at 16:24

## 2020-01-11 RX ADMIN — LEVOTHYROXINE SODIUM 75 MCG: 75 TABLET ORAL at 08:30

## 2020-01-11 RX ADMIN — SENNOSIDES AND DOCUSATE SODIUM 2 TABLET: 8.6; 5 TABLET ORAL at 21:03

## 2020-01-11 ASSESSMENT — ACTIVITIES OF DAILY LIVING (ADL)
ADLS_ACUITY_SCORE: 13

## 2020-01-11 NOTE — PLAN OF CARE
DATE & TIME: 1/11/20 @ 4101-4069  Cognitive Concerns/ Orientation : Oriented to self. Confused, forgetful and tearful.   BEHAVIOR & AGGRESSION TOOL COLOR: Green  CIWA SCORE: n/a   ABNL VS/O2: VSS on RA  MOBILITY: SB  PAIN MANAGMENT: Tylenol prn for HA  DIET: regular  BOWEL/BLADDER: BR  ABNL LAB/BG:   DRAIN/DEVICES: PIV SL  TELEMETRY RHYTHM: n/a  SKIN: intact  TESTS/PROCEDURES: none  D/C DAY/GOALS/PLACE:  [facility] Tuesday?  OTHER IMPORTANT INFO: Sitter at the bedside

## 2020-01-11 NOTE — PLAN OF CARE
DATE & TIME: 1/10/20 1900 - 1/11/20 0700    COGNITION/BEHAVIOR: Alert to self only.  Behavior is appropriate.  Vital signs:  Temp: 97.5  F (36.4  C) Temp src: Oral BP: 114/77 Pulse: 82   Resp: 16 SpO2: 97 % via RA  MOBILITY: SBA  PAIN: Denies  DIET: Regular  RESP: LS clear  CV/PV: On metoprolol and Eliquis for A-Fib.  Rate controlled.  GI/: Voiding adequately  SKIN: Intact  LINES: PIV  OTHER: Bedside attendant pulled at 0300

## 2020-01-11 NOTE — PROGRESS NOTES
[facility]    Medicine Progress Note - Hospitalist Service       Date of Admission:  1/6/2020  Assessment & Plan   Annabel May is an 85-year-old female with severe dementia, hypertension, hypothyroidism, glaucoma, and atrial fibrillation who was brought to ER today for dementia with agitation.     Dementia with behavioral disturbances and agitation  It seems her memory issues have been worsening and she has been getting more agitated, getting difficult for family to control at the assisted living facility.   Suspected UTI may also be contributing.  Agitation improved with recent increase to Seroquel   - PT/OT evaluation and  for disposition planning  - Scheduled Seroquel increased by psychiatry to 50 mg in the evening and 37.5 mg in the AM   - Seroquel PRN     Abnormal urinalysis, likely urinary tract infection  Difficult to assess her symptoms.  UA does look slightly abnormal.   Urine culture growing 10-50k E coli that is sensitive to Ceftriaxone   - Finished a 3 day course of Ceftriaxone     Constipation  Noted on CT scan with large stool.  It is possible this could be worsening her agitation.    - Continue scheduled laxatives along with PRN for now   - Enema given on 1/7    Hypertension  - PTA Lisinopril and metoprolol    Atrial fibrillation  - Continue metoprolol  - Continue Eliquis    Hypothyroidism  - Continue Synthroid    Anxiety, depression  - Continue trazodone    Glaucoma  We will hold off on her eyedrops while under observation status       Diet: Regular Diet Adult    DVT Prophylaxis: Pneumatic Compression Devices  Garcia Catheter: not present  Code Status: DNR/DNI      Disposition Plan   Expected discharge: 3-4 days, recommended to transitional care unit once safe disposition plan/ TCU bed available.  Entered: Jesus Meyers DO 01/11/2020, 1:18 PM       The patient's care was discussed with the Bedside Nurse, Care Coordinator/ and Patient.    Jesus FARMER  DO Luigi  Hospitalist Service  [facility]    ______________________________________________________________________    Interval History   Patient seen and examined.  No acute events over night.  No pain or difficulty breathing.     Data reviewed today: I reviewed all medications, new labs and imaging results over the last 24 hours. I personally reviewed no images or EKG's today.    Physical Exam   Vital Signs: Temp: 98  F (36.7  C) Temp src: Oral BP: (!) 140/80 Pulse: 74   Resp: 16 SpO2: 100 % O2 Device: None (Room air)    Weight: 0 lbs 0 oz  General Appearance: Pleasantly dementing.  NAD   Respiratory: Clear to auscultation.  No respiratory distress  Cardiovascular: RRR.  No obvious murmurs  GI: Bowel sounds present.  Non-tender  Skin: No rashes.  No cyanosis  Other: No edema.  No calf tenderness      Data   Recent Labs   Lab 01/09/20  0903 01/06/20  1530   WBC  --  7.9   HGB  --  14.7   MCV  --  94   PLT  --  172   NA  --  141   POTASSIUM  --  3.8   CHLORIDE  --  111*   CO2  --  23   BUN  --  22   CR 0.97 1.02   ANIONGAP  --  7   AMERICO  --  9.9   GLC  --  93   ALBUMIN  --  4.4   PROTTOTAL  --  7.7   BILITOTAL  --  0.9   ALKPHOS  --  66   ALT  --  29   AST  --  31   LIPASE  --  244     No results found for this or any previous visit (from the past 24 hour(s)).   Current some day smoker

## 2020-01-11 NOTE — PLAN OF CARE
"DATE & TIME: 1/10/2020 7485-6209                        Cognitive Concerns/ Orientation : Oriented to self only   BEHAVIOR & AGGRESSION TOOL COLOR: Yellow due to confusion  CIWA SCORE: NA    ABNL VS/O2: VSS on RA  MOBILITY: Up with SBA/independent, sitter at bedside  PAIN MANAGMENT: Denies  DIET: Regular  BOWEL/BLADDER: Continent  ABNL LAB/BG: see epic  DRAIN/DEVICES: PIV SL  TELEMETRY RHYTHM: NA  SKIN: Bruising  TESTS/PROCEDURES: NA  D/C DAY/GOALS/PLACE: Pending  OTHER IMPORTANT INFO: Sitter at bedside. Restless at times. Tearful, states \"she has been waiting for her  for a long time.\" Given scheduled seroquel. Nursing will continue to monitor.  "

## 2020-01-12 LAB
CREAT SERPL-MCNC: 1.09 MG/DL (ref 0.52–1.04)
GFR SERPL CREATININE-BSD FRML MDRD: 46 ML/MIN/{1.73_M2}

## 2020-01-12 PROCEDURE — 99231 SBSQ HOSP IP/OBS SF/LOW 25: CPT | Performed by: INTERNAL MEDICINE

## 2020-01-12 PROCEDURE — 82565 ASSAY OF CREATININE: CPT | Performed by: HOSPITALIST

## 2020-01-12 PROCEDURE — 25000132 ZZH RX MED GY IP 250 OP 250 PS 637: Mod: GY | Performed by: HOSPITALIST

## 2020-01-12 PROCEDURE — 36415 COLL VENOUS BLD VENIPUNCTURE: CPT | Performed by: HOSPITALIST

## 2020-01-12 PROCEDURE — 12000000 ZZH R&B MED SURG/OB

## 2020-01-12 PROCEDURE — 25000132 ZZH RX MED GY IP 250 OP 250 PS 637: Mod: GY | Performed by: PSYCHIATRY & NEUROLOGY

## 2020-01-12 RX ADMIN — APIXABAN 2.5 MG: 2.5 TABLET, FILM COATED ORAL at 22:51

## 2020-01-12 RX ADMIN — APIXABAN 2.5 MG: 2.5 TABLET, FILM COATED ORAL at 08:11

## 2020-01-12 RX ADMIN — MELATONIN 5 MG TABLET 5 MG: at 22:51

## 2020-01-12 RX ADMIN — SENNOSIDES AND DOCUSATE SODIUM 1 TABLET: 8.6; 5 TABLET ORAL at 22:50

## 2020-01-12 RX ADMIN — QUETIAPINE 37.5 MG: 25 TABLET ORAL at 08:12

## 2020-01-12 RX ADMIN — MAGNESIUM HYDROXIDE 30 ML: 400 SUSPENSION ORAL at 10:13

## 2020-01-12 RX ADMIN — SENNOSIDES AND DOCUSATE SODIUM 2 TABLET: 8.6; 5 TABLET ORAL at 08:11

## 2020-01-12 RX ADMIN — LEVOTHYROXINE SODIUM 75 MCG: 75 TABLET ORAL at 08:12

## 2020-01-12 RX ADMIN — METOPROLOL SUCCINATE 25 MG: 25 TABLET, EXTENDED RELEASE ORAL at 08:11

## 2020-01-12 RX ADMIN — TRAZODONE HYDROCHLORIDE 150 MG: 100 TABLET ORAL at 22:50

## 2020-01-12 RX ADMIN — BISACODYL 10 MG: 10 SUPPOSITORY RECTAL at 13:49

## 2020-01-12 RX ADMIN — QUETIAPINE FUMARATE 50 MG: 50 TABLET ORAL at 16:37

## 2020-01-12 ASSESSMENT — ACTIVITIES OF DAILY LIVING (ADL)
ADLS_ACUITY_SCORE: 20
ADLS_ACUITY_SCORE: 20
ADLS_ACUITY_SCORE: 13
ADLS_ACUITY_SCORE: 20

## 2020-01-12 NOTE — PLAN OF CARE
DATE & TIME: 1/11/20 @ 1900- 2300  Cognitive Concerns/ Orientation : Oriented to self. Confused, forgetful and tearful at times.   BEHAVIOR & AGGRESSION TOOL COLOR: Green  CIWA SCORE: n/a   ABNL VS/O2: VSS on RA  MOBILITY: SBA  PAIN MANAGMENT: Denied pain  DIET: regular  BOWEL/BLADDER: BR, voiding  ABNL LAB/BG: n/a  DRAIN/DEVICES: PIV SL  TELEMETRY RHYTHM: n/a  SKIN: intact, R hand bruised  TESTS/PROCEDURES: none  D/C DAY/GOALS/PLACE:  Possibly [facility] Tuesday  OTHER IMPORTANT INFO: Sitter at the bedside

## 2020-01-12 NOTE — PLAN OF CARE
DATE & TIME: 1/11/20, 6054 - 8820   Cognitive Concerns/ Orientation : A&O x self only. Confused   BEHAVIOR & AGGRESSION TOOL COLOR: Green   ABNL VS/O2: BP soft, other VSS on room air  MOBILITY: SBA  PAIN MANAGMENT: Denied  DIET: Regular  BOWEL/BLADDER: Voids in bathroom  ABNL LAB/BG: N/a  DRAIN/DEVICES: PIV SL  SKIN: Right hand bruised  TESTS/PROCEDURES: N/a  D/C DAY/GOALS/PLACE: Possible discharge to [facility] on Tuesday  OTHER IMPORTANT INFO: Patient has a long arm sitter

## 2020-01-12 NOTE — PLAN OF CARE
DATE & TIME: 1/12/20. 3659-5547   Cognitive Concerns/ Orientation : A/O to self, sometimes to time.  BEHAVIOR & AGGRESSION TOOL COLOR: Yellow. Tearful, restless and wanting to leave. Bedside attendant utilized.  CIWA SCORE: N/A  ABNL VS/O2: WNL  MOBILITY: SBA  PAIN MANAGMENT: Denies  DIET: Regular   BOWEL/BLADDER: Continent.  ABNL LAB/BG:  Slightly elevated creatinine.   DRAIN/DEVICES: PIV SL  TELEMETRY RHYTHM: None  SKIN: WNL  TESTS/PROCEDURES: None  D/C DAY/GOALS/PLACE: Kathy the nurse from [place] will come to assess pt on Monday. Earliest they can accept is Tuesday.  OTHER IMPORTANT INFO: Family visited today.

## 2020-01-12 NOTE — PROGRESS NOTES
Addended by: EDGAR CASH on: 10/22/2019 03:09 PM     Modules accepted: Orders     [facility]    Medicine Progress Note - Hospitalist Service       Date of Admission:  1/6/2020  Assessment & Plan   Annabel May is an 85-year-old female with severe dementia, hypertension, hypothyroidism, glaucoma, and atrial fibrillation who was brought to ER today for dementia with agitation.     Dementia with behavioral disturbances and agitation  It seems her memory issues have been worsening and she has been getting more agitated, getting difficult for family to control at the assisted living facility.   Suspected UTI may also be contributing.  Agitation improved with recent increase to Seroquel   - PT/OT evaluation and  for disposition planning  - Scheduled Seroquel increased by psychiatry to 50 mg in the evening and 37.5 mg in the AM   - Seroquel PRN     Abnormal urinalysis, likely urinary tract infection  Difficult to assess her symptoms.  UA does look slightly abnormal.   Urine culture growing 10-50k E coli that is sensitive to Ceftriaxone   - Finished a 3 day course of Ceftriaxone     Constipation  Noted on CT scan with large stool.  It is possible this could be worsening her agitation.    - Continue scheduled laxatives along with PRN for now   - Enema given on 1/7    Hypertension  - PTA Lisinopril and metoprolol    Atrial fibrillation  - Continue metoprolol  - Continue Eliquis    Hypothyroidism  - Continue Synthroid    Anxiety, depression  - Continue trazodone    Glaucoma  We will hold off on her eyedrops while under observation status       Diet: Regular Diet Adult    DVT Prophylaxis: Pneumatic Compression Devices  Garcia Catheter: not present  Code Status: DNR/DNI      Disposition Plan   Expected discharge: 2 - 3 days, recommended to transitional care unit once safe disposition plan/ TCU bed available.  Entered: Jesus Meyers DO 01/12/2020, 1:47 PM       The patient's care was discussed with the Bedside Nurse, Care Coordinator/ and Patient.    Jesus FARMER  DO Luigi  Hospitalist Service  [facility]    ______________________________________________________________________    Interval History   Patient seen and examined.  No acute events over night.  No pain or difficulty breathing.  Tolerating diet.  No fevers or chills.    Data reviewed today: I reviewed all medications, new labs and imaging results over the last 24 hours. I personally reviewed no images or EKG's today.    Physical Exam   Vital Signs: Temp: 96.4  F (35.8  C) Temp src: Axillary BP: 111/57 Pulse: 95   Resp: 16 SpO2: 99 % O2 Device: None (Room air)    Weight: 0 lbs 0 oz  General Appearance: Resting comfortably.  NAD   Respiratory: Clear to auscultation.  No respiratory distress  Cardiovascular: RRR.  No murmurs  GI: Bowel sounds present.  Non-tender  Skin: No rashes.  No cyanosis  Other: Demented.  Tearful.  No edema      Data   Recent Labs   Lab 01/12/20  0831 01/09/20  0903 01/06/20  1530   WBC  --   --  7.9   HGB  --   --  14.7   MCV  --   --  94   PLT  --   --  172   NA  --   --  141   POTASSIUM  --   --  3.8   CHLORIDE  --   --  111*   CO2  --   --  23   BUN  --   --  22   CR 1.09* 0.97 1.02   ANIONGAP  --   --  7   AMERICO  --   --  9.9   GLC  --   --  93   ALBUMIN  --   --  4.4   PROTTOTAL  --   --  7.7   BILITOTAL  --   --  0.9   ALKPHOS  --   --  66   ALT  --   --  29   AST  --   --  31   LIPASE  --   --  244     No results found for this or any previous visit (from the past 24 hour(s)).

## 2020-01-13 PROCEDURE — 25000132 ZZH RX MED GY IP 250 OP 250 PS 637: Mod: GY | Performed by: PSYCHIATRY & NEUROLOGY

## 2020-01-13 PROCEDURE — 12000000 ZZH R&B MED SURG/OB

## 2020-01-13 PROCEDURE — 25000132 ZZH RX MED GY IP 250 OP 250 PS 637: Mod: GY | Performed by: HOSPITALIST

## 2020-01-13 PROCEDURE — 99232 SBSQ HOSP IP/OBS MODERATE 35: CPT | Performed by: INTERNAL MEDICINE

## 2020-01-13 PROCEDURE — 25000132 ZZH RX MED GY IP 250 OP 250 PS 637: Mod: GY | Performed by: INTERNAL MEDICINE

## 2020-01-13 RX ORDER — BRINZOLAMIDE 10 MG/ML
1 SUSPENSION/ DROPS OPHTHALMIC 2 TIMES DAILY
Status: DISCONTINUED | OUTPATIENT
Start: 2020-01-13 | End: 2020-01-14 | Stop reason: HOSPADM

## 2020-01-13 RX ORDER — TRAVOPROST OPHTHALMIC SOLUTION 0.04 MG/ML
1 SOLUTION OPHTHALMIC AT BEDTIME
Status: DISCONTINUED | OUTPATIENT
Start: 2020-01-13 | End: 2020-01-14 | Stop reason: HOSPADM

## 2020-01-13 RX ADMIN — BRINZOLAMIDE 1 DROP: 10 SUSPENSION/ DROPS OPHTHALMIC at 13:10

## 2020-01-13 RX ADMIN — TRAZODONE HYDROCHLORIDE 150 MG: 100 TABLET ORAL at 21:00

## 2020-01-13 RX ADMIN — MELATONIN 5 MG TABLET 5 MG: at 21:00

## 2020-01-13 RX ADMIN — QUETIAPINE 37.5 MG: 25 TABLET ORAL at 09:08

## 2020-01-13 RX ADMIN — METOPROLOL SUCCINATE 25 MG: 25 TABLET, EXTENDED RELEASE ORAL at 09:08

## 2020-01-13 RX ADMIN — BRINZOLAMIDE 1 DROP: 10 SUSPENSION/ DROPS OPHTHALMIC at 20:56

## 2020-01-13 RX ADMIN — QUETIAPINE FUMARATE 50 MG: 50 TABLET ORAL at 16:12

## 2020-01-13 RX ADMIN — LEVOTHYROXINE SODIUM 75 MCG: 75 TABLET ORAL at 09:08

## 2020-01-13 RX ADMIN — ACETAMINOPHEN 650 MG: 325 TABLET, FILM COATED ORAL at 19:43

## 2020-01-13 RX ADMIN — TRAVOPROST 1 DROP: 0.04 SOLUTION/ DROPS OPHTHALMIC at 21:00

## 2020-01-13 RX ADMIN — SENNOSIDES AND DOCUSATE SODIUM 2 TABLET: 8.6; 5 TABLET ORAL at 20:58

## 2020-01-13 RX ADMIN — APIXABAN 2.5 MG: 2.5 TABLET, FILM COATED ORAL at 09:08

## 2020-01-13 RX ADMIN — SENNOSIDES AND DOCUSATE SODIUM 1 TABLET: 8.6; 5 TABLET ORAL at 09:08

## 2020-01-13 RX ADMIN — APIXABAN 2.5 MG: 2.5 TABLET, FILM COATED ORAL at 20:58

## 2020-01-13 ASSESSMENT — ACTIVITIES OF DAILY LIVING (ADL)
ADLS_ACUITY_SCORE: 13
ADLS_ACUITY_SCORE: 20
ADLS_ACUITY_SCORE: 12
ADLS_ACUITY_SCORE: 13

## 2020-01-13 NOTE — PLAN OF CARE
DATE & TIME: 1/12/2020 5623-6194    Cognitive Concerns/ Orientation : A+Ox1   BEHAVIOR & AGGRESSION TOOL COLOR: Yellow; anxiety and pacing  CIWA SCORE: NA   ABNL VS/O2: RA  MOBILITY: WDL  PAIN MANAGMENT: Denies  DIET: Reg  BOWEL/BLADDER: WDL  ABNL LAB/BG: NA  DRAIN/DEVICES: NA  TELEMETRY RHYTHM: NA  SKIN: WDL  TESTS/PROCEDURES: None  D/C DAY/GOALS/PLACE: [facility] on Tuesday  OTHER IMPORTANT INFO: Do not discuss discharge with patient- per family

## 2020-01-13 NOTE — PLAN OF CARE
DATE & TIME: 1/12/20, 9050 - 5408   Cognitive Concerns/ Orientation : A&O x self only   BEHAVIOR & AGGRESSION TOOL COLOR: Green   ABNL VS/O2: VSS on room air  MOBILITY: SBA  PAIN MANAGMENT: Denied  DIET: Regular  BOWEL/BLADDER: Continent  ABNL LAB/BG: N/a  DRAIN/DEVICES: PIV SL  SKIN: Bruises  TESTS/PROCEDURES: N/a  D/C DAY/GOALS/PLACE: Patient to be assessed by Nurse Chavez from [place] today. Earliest they can accept patient is on Tuesday  OTHER IMPORTANT INFO: Do not discuss discharge with patient-per family

## 2020-01-13 NOTE — PLAN OF CARE
DATE & TIME: 1/13/20  7421-8171   Cognitive Concerns/ Orientation : Oriented x self; forgetful to full birthdate at times   BEHAVIOR & AGGRESSION TOOL COLOR: green, frequently asking for her glasses which are not here, glasses were supplied for reading, walking halls and up frequently with sitter.  CIWA SCORE:   ABNL VS/O2: VSS  MOBILITY: steady gait; up ad manny  PAIN MANAGMENT: denies  DIET: regular; fair intake, requires encouragement  BOWEL/BLADDER: continent  ABNL LAB/BG:   DRAIN/DEVICES: PIV LFA  TELEMETRY RHYTHM:   SKIN: intact, bruises  TESTS/PROCEDURES:   D/C DAY/GOALS/PLACE: possibly tomorrow to [facility]  OTHER IMPORTANT INFO:

## 2020-01-13 NOTE — PROGRESS NOTES
SW  WANG) Patient's family is working toward a move to [facility] memory care unit on 1/14.    I) Kathy RN from [place] assessed patient today. She believes patient is appropriate for their memory care. She notes son,  Kelvin will be in to sign the paperwork at 8:30 Tuesday morning and he is in the process of obtaining furniture.  Kathy believes patient can be moved there on Tuesday after the paperwork is signed and furniture is in place.    Hollie is the contact at [phone].    P) Following.

## 2020-01-13 NOTE — PROGRESS NOTES
[facility]    Hospitalist Progress Note    Interval History   - Pleasantly confused, asking to leave the hospital  - Per , possible discharge to [facility]    Assessment & Plan   Summary: Annabel May is a 85 year old female with PMH dementia, HTN, hypothyroidism, glaucoma, afib who was admitted on 1/6/2020 with agitation, acute metabolic encephalopathy.    Dementia with behavioral disturbances and agitation  It seems her memory issues have been worsening and she has been getting more agitated, getting difficult for family to control at the assisted living facility.   Suspected UTI may also be contributing.  Agitation improved with recent increase to Seroquel   - PT/OT,   - Scheduled Seroquel increased by psychiatry to 50 mg in the evening and 37.5 mg in the AM   - Seroquel PRN     Stable/Resolved  Abnormal urinalysis, likely urinary tract infection: Urine culture growing 10-50k E coli that is sensitive to Ceftriaxone. Finished a 3 day course of Ceftriaxone     Constipation: Noted on CT scan with large stool.  It is possible this could be worsening her agitation.  - Continue scheduled laxatives along with PRN for now   - Enema given on 1/7    Hypertension: PTA Lisinopril and metoprolol  Atrial fibrillation: PTA metoprolol, Eliquis  Hypothyroidism: PTA Synthroid  Anxiety, depression: PTA trazodone  Glaucoma: PTA eyedrops    DVT Prophylaxis: Pneumatic Compression Devices  Code Status: DNR/DNI  PT/OT: ordered    Disposition: Expected discharge per     Song Blancas MD  Text Page  (7am to 6pm)  -Data reviewed today: I reviewed all new labs and imaging results over the last 24 hours.    Physical Exam   Temp: 98  F (36.7  C) Temp src: Oral BP: 126/74 Pulse: 83 Heart Rate: 78 Resp: 16 SpO2: 97 % O2 Device: None (Room air)    There were no vitals filed for this visit.  Vital Signs with Ranges  Temp:  [97.9  F (36.6  C)-98.4  F (36.9  C)] 98  F (36.7   C)  Pulse:  [83] 83  Heart Rate:  [66-84] 78  Resp:  [16] 16  BP: (107-131)/(59-78) 126/74  SpO2:  [94 %-99 %] 97 %  I/O last 3 completed shifts:  In: 720 [P.O.:720]  Out: 300 [Urine:300]  O2 requirements: none    Constitutional: Elderly female in NAD  Cardiovascular: RRR, normal S1/2, no m/r/g  Respiratory: CTAB, no wheezing or crackles  Vascular: No LE pitting edema  GI: Nontender  Skin/Integumen: No rashes  Neuro/Psych: A&O to self only, moves all extremities    Medications       apixaban ANTICOAGULANT  2.5 mg Oral BID     levothyroxine  75 mcg Oral Daily     melatonin  5 mg Oral At Bedtime     metoprolol succinate ER  25 mg Oral Daily     QUEtiapine  37.5 mg Oral Daily     QUEtiapine  50 mg Oral Daily at 4 pm     senna-docusate  1 tablet Oral BID    Or     senna-docusate  2 tablet Oral BID     traZODone  150 mg Oral At Bedtime       Data   Recent Labs   Lab 01/12/20  0831 01/09/20  0903 01/06/20  1530   WBC  --   --  7.9   HGB  --   --  14.7   MCV  --   --  94   PLT  --   --  172   NA  --   --  141   POTASSIUM  --   --  3.8   CHLORIDE  --   --  111*   CO2  --   --  23   BUN  --   --  22   CR 1.09* 0.97 1.02   ANIONGAP  --   --  7   AMERICO  --   --  9.9   GLC  --   --  93   ALBUMIN  --   --  4.4   PROTTOTAL  --   --  7.7   BILITOTAL  --   --  0.9   ALKPHOS  --   --  66   ALT  --   --  29   AST  --   --  31   LIPASE  --   --  244       Imaging:   No results found for this or any previous visit (from the past 24 hour(s)).

## 2020-01-13 NOTE — PROGRESS NOTES
"BRIEF NUTRITION ASSESSMENT      REASON FOR ASSESSMENT:  Annabel May is a 85 year old female seen by Registered Dietitian for [facility]    NUTRITION HISTORY:  - PMH: severe dementia, hypertension. Hypothyroidism, glaucoma.   - lives at the [facility] with , however memory issues are getting progressively worse  - Admits for agitation.   - Pt unable to give reliable nutrition hx, though she mentions that she loves milk.   - NKFA    CURRENT DIET AND INTAKE:  Diet:  Regular diet            Pt consumes % of meals, with adequate meals ordered.   Per review pro/kcal ordered for the past 4 days:   1/12 - 1087 kcal, 51 g pro (2 meals)  1/11 - 2710 kcal, 117 g pro (3 meals)  1/10 - 2109 kcal, 95 g pro (3 meals)  1/9 - 2002 kcal, 82 g pro (3 meals)    ANTHROPOMETRICS:  Height: 5' 7\"  Weight:  59 kg  (taken 12/4/2019  BMI: 20.3 kg/m^2  Weight Status: Normal BMI  IBW:  61.4 kg  %IBW: 96%  Weight History: no recent weights on file.   Wt Readings from Last 10 Encounters:   12/04/19 59 kg (130 lb)   06/20/19 60.8 kg (134 lb)   10/30/14 59.9 kg (132 lb)   08/03/12 64.4 kg (142 lb)   06/28/12 65.8 kg (145 lb)   06/17/12 66.2 kg (146 lb)   10/26/11 67.1 kg (148 lb)   10/13/11 66.7 kg (147 lb)       LABS/MEDS/PHYSICAL FINDINGS:  Reviewed    Minimal muscle/fat mass at baseline. Suspect no acute changes.     BMx4 1/12. Bowel program in place.     MALNUTRITION:  Visual Nutrition Focused Physical Assessment (NFPA) completed. Patient does not meet two of the following criteria necessary for diagnosing malnutrition: significant weight loss, reduced intake, subcutaneous fat loss, muscle loss or fluid retention.     NUTRITION INTERVENTION:  Nutrition Diagnosis:  No nutrition diagnosis at this time.    Implementation:  Nutrition Education:  Not appropriate at this time due to patient condition    FOLLOW UP/MONITORING:   Will re-evaluate in 7 - 10 days, or sooner, if re-consulted.    Rachana Wilkinson, SANDI, LD  [facility], [address], MH "   Pager: [phone]  Weekend Pager: [phone]

## 2020-01-14 VITALS
OXYGEN SATURATION: 99 % | TEMPERATURE: 97.3 F | RESPIRATION RATE: 16 BRPM | HEART RATE: 83 BPM | SYSTOLIC BLOOD PRESSURE: 136 MMHG | DIASTOLIC BLOOD PRESSURE: 78 MMHG

## 2020-01-14 PROCEDURE — 25000132 ZZH RX MED GY IP 250 OP 250 PS 637: Mod: GY | Performed by: HOSPITALIST

## 2020-01-14 PROCEDURE — 99238 HOSP IP/OBS DSCHRG MGMT 30/<: CPT | Performed by: INTERNAL MEDICINE

## 2020-01-14 PROCEDURE — 25000132 ZZH RX MED GY IP 250 OP 250 PS 637: Mod: GY | Performed by: PSYCHIATRY & NEUROLOGY

## 2020-01-14 RX ORDER — QUETIAPINE FUMARATE 25 MG/1
37.5 TABLET, FILM COATED ORAL 2 TIMES DAILY
Qty: 90 TABLET | Refills: 0 | Status: SHIPPED | OUTPATIENT
Start: 2020-01-14 | End: 2020-02-19

## 2020-01-14 RX ORDER — QUETIAPINE FUMARATE 25 MG/1
25 TABLET, FILM COATED ORAL 2 TIMES DAILY PRN
Qty: 30 TABLET | Refills: 0 | Status: SHIPPED | OUTPATIENT
Start: 2020-01-14 | End: 2021-12-01

## 2020-01-14 RX ORDER — AMOXICILLIN 250 MG
1 CAPSULE ORAL 2 TIMES DAILY
Qty: 60 TABLET | Refills: 0 | Status: SHIPPED | OUTPATIENT
Start: 2020-01-14 | End: 2020-01-18

## 2020-01-14 RX ORDER — BISACODYL 10 MG
10 SUPPOSITORY, RECTAL RECTAL DAILY PRN
Qty: 5 SUPPOSITORY | Refills: 0 | Status: SHIPPED | OUTPATIENT
Start: 2020-01-14 | End: 2020-01-18

## 2020-01-14 RX ADMIN — BRINZOLAMIDE 1 DROP: 10 SUSPENSION/ DROPS OPHTHALMIC at 08:16

## 2020-01-14 RX ADMIN — APIXABAN 2.5 MG: 2.5 TABLET, FILM COATED ORAL at 08:14

## 2020-01-14 RX ADMIN — LEVOTHYROXINE SODIUM 75 MCG: 75 TABLET ORAL at 08:15

## 2020-01-14 RX ADMIN — SENNOSIDES AND DOCUSATE SODIUM 1 TABLET: 8.6; 5 TABLET ORAL at 08:15

## 2020-01-14 RX ADMIN — METOPROLOL SUCCINATE 25 MG: 25 TABLET, EXTENDED RELEASE ORAL at 08:15

## 2020-01-14 RX ADMIN — QUETIAPINE 37.5 MG: 25 TABLET ORAL at 08:14

## 2020-01-14 RX ADMIN — ACETAMINOPHEN 650 MG: 325 TABLET, FILM COATED ORAL at 08:17

## 2020-01-14 ASSESSMENT — ACTIVITIES OF DAILY LIVING (ADL)
ADLS_ACUITY_SCORE: 12

## 2020-01-14 NOTE — PROGRESS NOTES
"SPIRITUAL HEALTH SERVICES Progress Note  [facility]    Initiated visit due to LOS.  Pt was in room w/ sitter.  Pt stated that she was doing \"great,\" and stated that she was happy to be getting discharged \"home.\"  Pt states that she has 3 sons, at least one of which will be coming to get her today.  Pt states that she is Judaism, although does not follow any specific denomination.  Pt welcomed prayer.   offered emotional support and prayer.   has no plans to f/u at this time due to discharge plans, but remains available upon request.      Tho Borges  Chaplain Resident    "

## 2020-01-14 NOTE — PROGRESS NOTES
SW:  D:  Patient has been accepted by [facility] for today.  Writer spoke with son, Kelvin, and Hollie at [facility] admissions.  Kelvin would like to transport patient as soon as hospital can prepare for patient's discharge today.  CARLA Chavez, and [facility] needs to call writer to clarify if hospital needs to fill any discharge medications or if she will use their provider which is the [facility] pharmacy.   Writer has web paged Dr Blancas asking he complete orders.  PLAN: Will coordinate discharge time for today with son once writer hears back from CARLA Chavez.    Final note:    Patient was discharged today to [facility], son provided transportation.  New medications were filled by the [facility] pharmacy.  All arrangements reviewed with SHANNEN Chavez at [facility].

## 2020-01-14 NOTE — PLAN OF CARE
DATE & TIME: 1/14/2020 11pm-7am              Cognitive Concerns/ Orientation : Oriented x self. Slept for majority of shift peacefully  BEHAVIOR & AGGRESSION TOOL COLOR: green, remains with sit in room for roaming habits.  CIWA SCORE: N/A  ABNL VS/O2: VSS on RA  MOBILITY: steady gait; up ad manny/SBA  PAIN MANAGMENT: no pain this shift  DIET: regular  BOWEL/BLADDER: continent to bathroom  ABNL LAB/BG: crt. 1.09  DRAIN/DEVICES: PIV LFA  TELEMETRY RHYTHM: N/A  SKIN: intact, bruises  TESTS/PROCEDURES: N/A  D/C DAY/GOALS/PLACE: likely this morning to [facility]- family requests not mentioning discharge time to patient  OTHER IMPORTANT INFO:

## 2020-01-14 NOTE — DISCHARGE SUMMARY
[facility]    Hospitalist Discharge Summary       Date of Admission:  1/6/2020  Date of Discharge:  1/14/2020  Discharging Provider: Song Blancas MD      Discharge Diagnoses   Dementia with behavioral disturbances and agitation  UTI  Constipation    Follow-ups Needed After Discharge   Follow-up Appointments     Follow-up and recommended labs and tests       Follow up with primary care provider, Mynor Alvarez, within 7 days for   hospital follow- up.  No follow up labs or test are needed.           Hospital Course   Annabel May is a 85 year old female with PMH dementia, HTN, hypothyroidism, glaucoma, afib, anxiety, depression, who was admitted on 1/6/2020 with agitation, acute metabolic encephalopathy. It seems her memory issues have been worsening and she has been getting more agitated, getting difficult for family to control at the assisted living facility. Suspected UTI may also be contributing--treated with three day course of antibiotics. Agitation improved with recent increase of Seroquel.    Constipation: Noted on CT scan with large stool.  It is possible this could be worsening her agitation. Enema given on 1/7  - PRN laxatives    Consultations This Hospital Stay   SOCIAL WORK IP CONSULT  PHYSICAL THERAPY ADULT IP CONSULT  OCCUPATIONAL THERAPY ADULT IP CONSULT  PSYCHIATRY IP CONSULT  PHARMACY IP CONSULT  PSYCHIATRY IP CONSULT    Code Status   DNR/DNI    Time Spent on this Encounter   I, Song Blancas, personally saw the patient today and spent approximately 25 minutes discharging this patient.       Song Blancas MD  [facility]  ______________________________________________________________________    Physical Exam   Vital Signs: Temp: 97.3  F (36.3  C) Temp src: Oral BP: 136/78   Heart Rate: 62 Resp: 16 SpO2: 99 % O2 Device: None (Room air)    Weight: 0 lbs 0 oz    Constitutional: Elderly female in NAD  Eyes: PERRL, nonicteric, normal ocular  movements  Respiratory: CTAB, no wheezing or crackles  Cardiovascular: RRR, normal S1/2, no m/r/g  GI: No organomegaly, normoactive bowel sounds, nontender, nondistended  Musculoskeletal: Normal strength in UE and LE, moves all extremities  Neurologic: A&O to self only  Psychiatric: Appropriate affect and mood       Primary Care Physician   Mynor Alvarez    Discharge Disposition   Discharged to long-term care facility  Condition at discharge: Stable    Significant Results and Procedures   Most Recent 3 CBC's:  Recent Labs   Lab Test 01/06/20  1530 12/04/19  1421 07/10/14   WBC 7.9 8.1 6.5   HGB 14.7 14.0 15.5   MCV 94 96 98    163 136     Most Recent 3 BMP's:  Recent Labs   Lab Test 01/12/20  0831 01/09/20  0903 01/06/20  1530 12/04/19  1421 07/10/14 08/03/12  0847   NA  --   --  141 140 141 139   POTASSIUM  --   --  3.8 4.5 4.6 3.8   CHLORIDE  --   --  111* 111* 108 105   CO2  --   --  23 23  --  28   BUN  --   --  22 19 17 18   CR 1.09* 0.97 1.02 1.06* 0.88 0.91   ANIONGAP  --   --  7 6 8 6   AMERICO  --   --  9.9 8.8 9.4 9.1   GLC  --   --  93 95 91 88     Most Recent 2 LFT's:  Recent Labs   Lab Test 01/06/20  1530 07/10/14   AST 31 19   ALT 29 10   ALKPHOS 66 77   BILITOTAL 0.9 0.6   ,   Results for orders placed or performed during the hospital encounter of 01/06/20   CT Head w/o Contrast    Narrative    CT OF THE HEAD WITHOUT CONTRAST 1/6/2020 4:55 PM     COMPARISON: None.    HISTORY: Possible head injury, MS change    TECHNIQUE: 5 mm thick axial CT images of the head were acquired  without IV contrast material.    FINDINGS: There is moderate diffuse cerebral volume loss. There are  subtle patchy areas of decreased density in the cerebral white matter  bilaterally that are consistent with sequela of chronic small vessel  ischemic disease. There is a small area of chronic encephalomalacia at  the whitney-opercular aspect of the right frontal lobe and in the  anterior aspect of the right insular cortex  consistent with a chronic  ischemic infarct.    The ventricles and basal cisterns are within normal limits in  configuration given the degree of cerebral volume loss. There is no  midline shift. There are no extra-axial fluid collections.    No intracranial hemorrhage, mass or recent infarct.    The visualized paranasal sinuses are well-aerated. There is no  mastoiditis. There are no fractures of the visualized bones.      Impression    IMPRESSION: Diffuse cerebral volume loss and cerebral white matter  changes consistent with chronic small vessel ischemic disease. No  evidence for acute intracranial pathology. Probable chronic ischemic  infarct involving the right frontal lobe and right insular cortex.      Radiation dose for this scan was reduced using automated exposure  control, adjustment of the mA and/or kV according to patient size, or  iterative reconstruction technique    DOMONIQUE DUBOIS MD   CT Abdomen Pelvis w Contrast    Narrative    CT ABDOMEN PELVIS WITH CONTRAST   1/6/2020 4:54 PM     HISTORY: Abdominal pain left lower quadrant.      TECHNIQUE: CT abdomen and pelvis with 65 mL Isovue-370 IV. Radiation  dose for this scan was reduced using automated exposure control,  adjustment of the mA and/or kV according to patient size, or iterative  reconstruction technique.    COMPARISON: None available    FINDINGS:   Lower chest: Right basilar atelectasis.    Abdomen/pelvis: No suspicious focal hepatic lesion. The gallbladder is  unremarkable. No splenomegaly. No adrenal nodules. No pancreatic  ductal dilatation or solid pancreatic mass. No radiodense kidney  stones or hydronephrosis. Right renal cyst.    No abnormally dilated bowel loops. No significant free fluid in the  abdomen or pelvis. No free peritoneal or portal venous gas. Large  amount of stool throughout the colon. The uterus is not visualized,  likely surgically absent. Scattered predominantly aortobiiliac  atherosclerotic vascular  calcification.    Bones and soft tissues: Multilevel degenerative changes of the  visualized spine. Sclerotic foci in the spine, for example in the  posterior aspect of superior endplate of L5 (series 4 image 94),  indeterminate, could represent bony island, although direct comparison  between different modalities is difficult, but appear similar in size  as compared to 6/17/2012 MRI.      Impression    IMPRESSION:  1. No acute pathology in the abdomen or pelvis.  2. Large amount of stool throughout the colon, nonspecific, can be  seen with constipation.    ALEXANDREA SCOTT MD   XR Hand Right G/E 3 Views    Narrative    XR RIGHT HAND THREE OR MORE VIEWS 1/6/2020 4:56 PM     HISTORY: Hand pain after a possible fall.    COMPARISON: None.      Impression    IMPRESSION: No acute-appearing bony abnormality. Generalized bony  demineralization. Advanced osteoarthritis at the first carpometacarpal  joint with milder osteoarthritis at the triscaphe joint.  Osteoarthritis at multiple metacarpophalangeal and interphalangeal  joints is also seen. The mid diaphysis of the proximal phalanx of the  fourth and fifth fingers are obscured by overlying metallic rings.    JETT DEJESUS MD       Discharge Orders      Reason for your hospital stay    You were hospitalized for confusion, and inability to take care of yourself at your previous home.     Follow-up and recommended labs and tests     Follow up with primary care provider, Mynor Alvarez, within 7 days for hospital follow- up.  No follow up labs or test are needed.     Activity    Your activity upon discharge: activity as tolerated     Discharge Instructions     DNR/DNI     Diet    Follow this diet upon discharge:      Regular Diet Adult     Discharge Medications   Current Discharge Medication List      START taking these medications    Details   bisacodyl (DULCOLAX) 10 MG suppository Place 1 suppository (10 mg) rectally daily as needed for constipation  Qty: 5  suppository, Refills: 0    Associated Diagnoses: Dementia with behavioral disturbance, unspecified dementia type (H)      senna-docusate (SENOKOT-S/PERICOLACE) 8.6-50 MG tablet Take 1 tablet by mouth 2 times daily  Qty: 60 tablet, Refills: 0    Associated Diagnoses: Dementia with behavioral disturbance, unspecified dementia type (H)         CONTINUE these medications which have CHANGED    Details   !! QUEtiapine (SEROQUEL) 25 MG tablet Take 1.5 tablets (37.5 mg) by mouth 2 times daily Takes 4-5 hours apart  Qty: 90 tablet, Refills: 0    Associated Diagnoses: Dementia with behavioral disturbance, unspecified dementia type (H)      !! QUEtiapine (SEROQUEL) 25 MG tablet Take 1 tablet (25 mg) by mouth 2 times daily as needed (agitation)  Qty: 30 tablet, Refills: 0    Associated Diagnoses: Dementia with behavioral disturbance, unspecified dementia type (H)       !! - Potential duplicate medications found. Please discuss with provider.      CONTINUE these medications which have NOT CHANGED    Details   acetaminophen (TYLENOL) 500 MG tablet Take 500 mg by mouth every 6 hours as needed for mild pain      alendronate (FOSAMAX) 70 MG tablet Take 70 mg by mouth every 7 days sunday      apixaban ANTICOAGULANT (ELIQUIS) 2.5 MG tablet Take 2.5 mg by mouth 2 times daily       brinzolamide (AZOPT) 1 % ophthalmic susp Place 1 drop into both eyes 2 times daily       cyanocobalamin (VITAMIN B-12) 100 MCG tablet Take 100 mcg by mouth daily      levothyroxine (SYNTHROID/LEVOTHROID) 75 MCG tablet Take 75 mcg by mouth daily      melatonin 5 MG tablet Take 5 mg by mouth At Bedtime      metoprolol (TOPROL-XL) 25 MG 24 hr tablet Take 25 mg by mouth daily       Multiple Vitamins-Minerals (PRESERVISION AREDS) CAPS Take 1 capsule by mouth 2 times daily      travoprost, BAK Free, (TRAVATAN Z) 0.004 % ophthalmic solution Place 1 drop into both eyes At Bedtime       traZODone (DESYREL) 50 MG tablet Take 150 mg by mouth At Bedtime             Allergies   Allergies   Allergen Reactions     Fentanyl Nausea and Vomiting     Midazolam      Other reaction(s): *Unknown     Vecuronium Unknown     Son reports reaction was severe

## 2020-01-14 NOTE — PLAN OF CARE
DATE & TIME: 1/13/20  3-11pm          Cognitive Concerns/ Orientation : Oriented x self. Very emotional this shift, sad and tearful.  BEHAVIOR & AGGRESSION TOOL COLOR: green, remains with sit in room for roaming habits.  CIWA SCORE: N/A  ABNL VS/O2: VSS on RA  MOBILITY: steady gait; up ad manny/SBA  PAIN MANAGMENT: headache and whitney pain patient reports from recent surgery but no documentation on it other then uti  DIET: regular; fair intake, requires encouragement  BOWEL/BLADDER: continent to bathroom  ABNL LAB/BG: crt. 1.09  DRAIN/DEVICES: PIV LFA  TELEMETRY RHYTHM: N/A  SKIN: intact, bruises  TESTS/PROCEDURES: N/A  D/C DAY/GOALS/PLACE: possibly tomorrow to [facility]- family requests not mentioning discharge time to patient  OTHER IMPORTANT INFO:

## 2020-01-14 NOTE — PLAN OF CARE
Discharge    Patient discharged to [place] via wheelchair with son.    Care plan note:    DATE & TIME: 01/14/2020 7-3 pm   Cognitive Concerns/ Orientation : Alert to self. Confused.   BEHAVIOR & AGGRESSION TOOL COLOR: Green   ABNL VS/O2: VSS on RA.  MOBILITY: IND, sitter in room to assist pt to BR and ambulating in hallway.   PAIN MANAGMENT: Tylenol x1 for headache this am.   DIET: Regular  BOWEL/BLADDER: No BM,  BS +.   ABNL LAB/BG: N/A  DRAIN/DEVICES: PIV removed for discharge.   TELEMETRY RHYTHM: N/A  SKIN: Intact, bruising.   TESTS/PROCEDURES: None scheduled.   D/C DAY/GOALS/PLACE: Today w/ Son to [place].  OTHER IMPORTANT INFO: Sitter at bedside.     Listed belongings gathered and returned to patient. Yes  Care Plan and Patient education resolved: Yes  Prescriptions if needed, hard copies sent with patient  NA  Home and hospital acquired medications returned to patient: NA  Medication Bin checked and emptied on discharge Yes  Follow up appointment made for patient: No

## 2020-01-15 ENCOUNTER — ASSISTED LIVING VISIT (OUTPATIENT)
Dept: GERIATRICS | Facility: CLINIC | Age: 85
End: 2020-01-15
Payer: MEDICARE

## 2020-01-15 DIAGNOSIS — G47.00 INSOMNIA, UNSPECIFIED TYPE: ICD-10-CM

## 2020-01-15 DIAGNOSIS — E03.9 HYPOTHYROIDISM, UNSPECIFIED TYPE: ICD-10-CM

## 2020-01-15 DIAGNOSIS — F03.91 DEMENTIA WITH BEHAVIORAL DISTURBANCE, UNSPECIFIED DEMENTIA TYPE: Primary | ICD-10-CM

## 2020-01-15 DIAGNOSIS — M81.0 AGE-RELATED OSTEOPOROSIS WITHOUT CURRENT PATHOLOGICAL FRACTURE: ICD-10-CM

## 2020-01-15 DIAGNOSIS — H40.9 GLAUCOMA OF RIGHT EYE, UNSPECIFIED GLAUCOMA TYPE: ICD-10-CM

## 2020-01-15 DIAGNOSIS — K59.01 SLOW TRANSIT CONSTIPATION: ICD-10-CM

## 2020-01-15 DIAGNOSIS — I48.20 CHRONIC ATRIAL FIBRILLATION (H): ICD-10-CM

## 2020-01-15 DIAGNOSIS — H35.30 MACULAR DEGENERATION (SENILE) OF RETINA: ICD-10-CM

## 2020-01-15 DIAGNOSIS — M15.0 PRIMARY OSTEOARTHRITIS INVOLVING MULTIPLE JOINTS: ICD-10-CM

## 2020-01-15 DIAGNOSIS — I10 ESSENTIAL HYPERTENSION: ICD-10-CM

## 2020-01-15 DIAGNOSIS — E53.8 VITAMIN B 12 DEFICIENCY: ICD-10-CM

## 2020-01-15 NOTE — LETTER
1/15/2020        RE: [facility] [address]  [address]  [place] [address]        [facility]  PRIMARY CARE PROVIDER AND CLINIC:  ZAFAR Bae CNP, [address] / [place] [address]  Chief Complaint   Patient presents with     Establish Care     [facility] Medical Record Number:  [id number]  Place of Service where encounter took place:  [facility] [[id number]]    Annabel May  is a 85 year old  (5/12/1934), admitted to the above facility from  [facility]. Hospital stay 01/06/20 through 01/14/20..  Admitted to this facility for  nursing care.    HPI:    HPI information obtained from: facility chart records, [facility] chart review and family/first contact son report.   Brief Summary of Hospital Course: was admitted with agitation, acute metabolic encephalopathy secondary to UTI, worsening dementia needing higher level of care post hospitalization. History of dementia, HTN, hypothyroidism, glaucoma, afib, anxiety, depression, breast cancer.  Updates on Status Since memory care Admission: calm and pleasant in room. Conversation is nonsensical. History of constipation and need to watch for s/s. Recent increase to Seroquel to help with agitation, anxiety. Her son, a radiologist provides history. She has lived at [facility] for approximately 4.5 years with her seconded  Jw. But with advancing dementia and her  with his own medical issues she is needing higher level of care    CODE STATUS/ADVANCE DIRECTIVES DISCUSSION:   DNR / DNI  Patient's living condition: lives in an assisted living facility-[facility] care  ALLERGIES: Fentanyl; Midazolam; and Vecuronium  PAST MEDICAL HISTORY:  has a past medical history of Arrhythmia, Arthritis, Breast cancer (H), Hyperlipidaemia, Hypertension, Hypothyroid, Migraines, PONV (postoperative nausea and vomiting), and Syncope. She also has no past medical history of Malignant  neoplasm (H).  PAST SURGICAL HISTORY:   has a past surgical history that includes Breast surgery; Head and neck surgery; ENT surgery; Hysterectomy; GYN surgery; appendectomy; Cholecystectomy; Phacoemulsification clear cornea with standard intraocular lens implant, endoscopic cyclophotocoagulation, combined (6/28/2012); and Glaucoma surgery (Left, 10/16/2017).  FAMILY HISTORY: family history is not on file.  SOCIAL HISTORY:   reports that she has never smoked. She has never used smokeless tobacco. She reports current alcohol use. She reports that she does not use drugs.    Post Discharge Medication Reconciliation Status: discharge medications reconciled, continue medications without change    Current Outpatient Medications   Medication Sig Dispense Refill     acetaminophen (TYLENOL) 500 MG tablet Take 500 mg by mouth every 6 hours as needed for mild pain       alendronate (FOSAMAX) 70 MG tablet Take 70 mg by mouth every 7 days sunday       apixaban ANTICOAGULANT (ELIQUIS) 2.5 MG tablet Take 2.5 mg by mouth 2 times daily        bisacodyl (DULCOLAX) 10 MG suppository Place 1 suppository (10 mg) rectally daily as needed for constipation 5 suppository 0     brinzolamide (AZOPT) 1 % ophthalmic susp Place 1 drop into both eyes 2 times daily        cyanocobalamin (VITAMIN B-12) 100 MCG tablet Take 100 mcg by mouth daily       levothyroxine (SYNTHROID/LEVOTHROID) 75 MCG tablet Take 75 mcg by mouth daily       melatonin 5 MG tablet Take 5 mg by mouth At Bedtime       metoprolol (TOPROL-XL) 25 MG 24 hr tablet Take 25 mg by mouth daily        Multiple Vitamins-Minerals (PRESERVISION AREDS) CAPS Take 1 capsule by mouth 2 times daily       QUEtiapine (SEROQUEL) 25 MG tablet Take 1.5 tablets (37.5 mg) by mouth 2 times daily Takes 4-5 hours apart 90 tablet 0     QUEtiapine (SEROQUEL) 25 MG tablet Take 1 tablet (25 mg) by mouth 2 times daily as needed (agitation) 30 tablet 0     senna-docusate (SENOKOT-S/PERICOLACE) 8.6-50 MG tablet  Take 1 tablet by mouth 2 times daily 60 tablet 0     travoprost, SOCO Free, (TRAVATAN Z) 0.004 % ophthalmic solution Place 1 drop into both eyes At Bedtime        traZODone (DESYREL) 50 MG tablet Take 150 mg by mouth At Bedtime          ROS:  Limited secondary to cognitive impairment but today pt reports ok    Vitals:  BP (!) 167/68   Pulse 81   Temp 98.7  F (37.1  C)   Resp 16   SpO2 95%   Exam:  GENERAL APPEARANCE:  Alert, in no distress  ENT:  Mouth and posterior oropharynx normal, moist mucous membranes, normal hearing acuity  EYES:  EOM, conjunctivae, lids, pupils and irises normal  RESP:  respiratory effort and palpation of chest normal, lungs clear to auscultation   CV:  Palpation and auscultation of heart done , regular rate and rhythm, no murmur, rub, or gallop  ABDOMEN:  normal bowel sounds, soft, nontender, no hepatosplenomegaly or other masses  M/S:   Gait and station normal  SKIN:  Inspection of skin and subcutaneous tissue baseline to visualized areas. Cyst/nodule hard on index finger right hand  NEURO:   Cranial nerves 2-12 are normal tested and grossly at patient's baseline  PSYCH:  insight and judgement impaired, memory impaired     Lab/Diagnostic data:  CBC RESULTS:   Recent Labs   Lab Test 01/06/20  1530 12/04/19  1421   WBC 7.9 8.1   RBC 4.70 4.43   HGB 14.7 14.0   HCT 44.3 42.5   MCV 94 96   MCH 31.3 31.6   MCHC 33.2 32.9   RDW 13.8 13.5    163       Last Basic Metabolic Panel:  Recent Labs   Lab Test 01/12/20  0831 01/09/20  0903 01/06/20  1530 12/04/19  1421   NA  --   --  141 140   POTASSIUM  --   --  3.8 4.5   CHLORIDE  --   --  111* 111*   AMERICO  --   --  9.9 8.8   CO2  --   --  23 23   BUN  --   --  22 19   CR 1.09* 0.97 1.02 1.06*   GLC  --   --  93 95       Liver Function Studies -   Recent Labs   Lab Test 01/06/20  1530 07/10/14 08/03/12  0847   PROTTOTAL 7.7 6.9 6.6*   ALBUMIN 4.4  --  4.0   BILITOTAL 0.9 0.6 0.7   ALKPHOS 66 77 66   AST 31 19 21   ALT 29 10 19       TSH   Date  Value Ref Range Status   07/10/2014 0.98 0.35 - 4.94 mcU/mL Final   08/03/2012 6.61 (H) 0.4 - 5.0 mU/L Final     TSH on 10/2/2019:   2.21  Vitamin D total 10/2/2019:  46.1  Vitamin B12 10/2/2019:  368         No results found for: A1C    ASSESSMENT/PLAN:  (F03.91) Dementia with behavioral disturbance, unspecified dementia type (H)  (primary encounter diagnosis)  Comment: advancing and now needing memory care with agitation/aggression. History of anxiety and depression-now off medication. Prior use of nortriptyline and Zoloft   Plan:   -Seroquel 25 mg po BID prn for agitation  -Seroquel 37.5 mg po BID    (I10) Essential hypertension  (I48.20) Chronic atrial fibrillation  Comment: with pacemaker. Family electing no remote checks at this time  Plan:   -Eliquis 2.5 mg po BID  -Toprol-XL 25 mg po daily   -monthly weights and VS with visits  -BMP periodically     (M15.0) Primary osteoarthritis involving multiple joints  (M81.0) Age-related osteoporosis without current pathological fracture  Comment:  chronic  Plan:   -Fosamax weekly (not on calcium/D supp related to constipation?)  -Tylenol 500 mg po q6H prn for pain    (K59.01) Slow transit constipation  Comment: hx of constipation/obstipation   Plan:   -Senna S 1 tablet BID and add 1 tablet BID PRN  -Miralax 17g po every other day and hold for loose stools  -dulcolax supp daily prn    (H40.9) Glaucoma of right eye, unspecified glaucoma type  (H35.30) Macular degeneration (senile) of retina  Comment: stable  Plan:   -Preservision 1 capsule po BID  -travoprost drops at HS  -Azopt drops daily    (G47.00) Insomnia, unspecified type  Comment: stable  Plan:   -Trazodone 150 mg po daily at HS  -melatonin 5 mg po daily at HS    (E53.8) Vitamin B12 deficiency   Comment: last level WNL  Plan:  -continue supplement    (E03.9) Hypothyroidism, unspecified  Comment: chronic  Plan:  Follow TSH  -levothyroxine 75 mcg po daily      Electronically signed by:  ZAFAR Bae CNP                      Sincerely,        ZAFAR Bae CNP

## 2020-01-15 NOTE — PROGRESS NOTES
[facility]  PRIMARY CARE PROVIDER AND CLINIC:  Reese Morfin, ZAFAR CNP, [address] / [place] [address]  Chief Complaint   Patient presents with     Establish Care     [facility] Record Number:  [id number]  Place of Service where encounter took place:  [facility] [[id number]]    Annabel May  is a 85 year old  (5/12/1934), admitted to the above facility from  [facility]. Hospital stay 01/06/20 through 01/14/20..  Admitted to this facility for  nursing care.    HPI:    HPI information obtained from: facility chart records, [facility] chart review and family/first contact son report.   Brief Summary of Hospital Course: was admitted with agitation, acute metabolic encephalopathy secondary to UTI, worsening dementia needing higher level of care post hospitalization. History of dementia, HTN, hypothyroidism, glaucoma, afib, anxiety, depression, breast cancer.  Updates on Status Since memory care Admission: calm and pleasant in room. Conversation is nonsensical. History of constipation and need to watch for s/s. Recent increase to Seroquel to help with agitation, anxiety. Her son, a radiologist provides history. She has lived at [facility] for approximately 4.5 years with her seconded  Jw. But with advancing dementia and her  with his own medical issues she is needing higher level of care    CODE STATUS/ADVANCE DIRECTIVES DISCUSSION:   DNR / DNI  Patient's living condition: lives in an assisted living facility-memory care  ALLERGIES: Fentanyl; Midazolam; and Vecuronium  PAST MEDICAL HISTORY:  has a past medical history of Arrhythmia, Arthritis, Breast cancer (H), Hyperlipidaemia, Hypertension, Hypothyroid, Migraines, PONV (postoperative nausea and vomiting), and Syncope. She also has no past medical history of Malignant neoplasm (H).  PAST SURGICAL HISTORY:   has a past surgical history that includes Breast surgery;  Head and neck surgery; ENT surgery; Hysterectomy; GYN surgery; appendectomy; Cholecystectomy; Phacoemulsification clear cornea with standard intraocular lens implant, endoscopic cyclophotocoagulation, combined (6/28/2012); and Glaucoma surgery (Left, 10/16/2017).  FAMILY HISTORY: family history is not on file.  SOCIAL HISTORY:   reports that she has never smoked. She has never used smokeless tobacco. She reports current alcohol use. She reports that she does not use drugs.    Post Discharge Medication Reconciliation Status: discharge medications reconciled, continue medications without change    Current Outpatient Medications   Medication Sig Dispense Refill     acetaminophen (TYLENOL) 500 MG tablet Take 500 mg by mouth every 6 hours as needed for mild pain       alendronate (FOSAMAX) 70 MG tablet Take 70 mg by mouth every 7 days sunday       apixaban ANTICOAGULANT (ELIQUIS) 2.5 MG tablet Take 2.5 mg by mouth 2 times daily        bisacodyl (DULCOLAX) 10 MG suppository Place 1 suppository (10 mg) rectally daily as needed for constipation 5 suppository 0     brinzolamide (AZOPT) 1 % ophthalmic susp Place 1 drop into both eyes 2 times daily        cyanocobalamin (VITAMIN B-12) 100 MCG tablet Take 100 mcg by mouth daily       levothyroxine (SYNTHROID/LEVOTHROID) 75 MCG tablet Take 75 mcg by mouth daily       melatonin 5 MG tablet Take 5 mg by mouth At Bedtime       metoprolol (TOPROL-XL) 25 MG 24 hr tablet Take 25 mg by mouth daily        Multiple Vitamins-Minerals (PRESERVISION AREDS) CAPS Take 1 capsule by mouth 2 times daily       QUEtiapine (SEROQUEL) 25 MG tablet Take 1.5 tablets (37.5 mg) by mouth 2 times daily Takes 4-5 hours apart 90 tablet 0     QUEtiapine (SEROQUEL) 25 MG tablet Take 1 tablet (25 mg) by mouth 2 times daily as needed (agitation) 30 tablet 0     senna-docusate (SENOKOT-S/PERICOLACE) 8.6-50 MG tablet Take 1 tablet by mouth 2 times daily 60 tablet 0     travoprost, BAK Free, (TRAVATAN Z) 0.004 %  ophthalmic solution Place 1 drop into both eyes At Bedtime        traZODone (DESYREL) 50 MG tablet Take 150 mg by mouth At Bedtime          ROS:  Limited secondary to cognitive impairment but today pt reports ok    Vitals:  BP (!) 167/68   Pulse 81   Temp 98.7  F (37.1  C)   Resp 16   SpO2 95%   Exam:  GENERAL APPEARANCE:  Alert, in no distress  ENT:  Mouth and posterior oropharynx normal, moist mucous membranes, normal hearing acuity  EYES:  EOM, conjunctivae, lids, pupils and irises normal  RESP:  respiratory effort and palpation of chest normal, lungs clear to auscultation   CV:  Palpation and auscultation of heart done , regular rate and rhythm, no murmur, rub, or gallop  ABDOMEN:  normal bowel sounds, soft, nontender, no hepatosplenomegaly or other masses  M/S:   Gait and station normal  SKIN:  Inspection of skin and subcutaneous tissue baseline to visualized areas. Cyst/nodule hard on index finger right hand  NEURO:   Cranial nerves 2-12 are normal tested and grossly at patient's baseline  PSYCH:  insight and judgement impaired, memory impaired     Lab/Diagnostic data:  CBC RESULTS:   Recent Labs   Lab Test 01/06/20  1530 12/04/19  1421   WBC 7.9 8.1   RBC 4.70 4.43   HGB 14.7 14.0   HCT 44.3 42.5   MCV 94 96   MCH 31.3 31.6   MCHC 33.2 32.9   RDW 13.8 13.5    163       Last Basic Metabolic Panel:  Recent Labs   Lab Test 01/12/20  0831 01/09/20  0903 01/06/20  1530 12/04/19  1421   NA  --   --  141 140   POTASSIUM  --   --  3.8 4.5   CHLORIDE  --   --  111* 111*   AMERICO  --   --  9.9 8.8   CO2  --   --  23 23   BUN  --   --  22 19   CR 1.09* 0.97 1.02 1.06*   GLC  --   --  93 95       Liver Function Studies -   Recent Labs   Lab Test 01/06/20  1530 07/10/14 08/03/12  0847   PROTTOTAL 7.7 6.9 6.6*   ALBUMIN 4.4  --  4.0   BILITOTAL 0.9 0.6 0.7   ALKPHOS 66 77 66   AST 31 19 21   ALT 29 10 19       TSH   Date Value Ref Range Status   07/10/2014 0.98 0.35 - 4.94 mcU/mL Final   08/03/2012 6.61 (H) 0.4 -  5.0 mU/L Final     TSH on 10/2/2019:   2.21  Vitamin D total 10/2/2019:  46.1  Vitamin B12 10/2/2019:  368         No results found for: A1C    ASSESSMENT/PLAN:  (F03.91) Dementia with behavioral disturbance, unspecified dementia type (H)  (primary encounter diagnosis)  Comment: advancing and now needing memory care with agitation/aggression. History of anxiety and depression-now off medication. Prior use of nortriptyline and Zoloft   Plan:   -Seroquel 25 mg po BID prn for agitation  -Seroquel 37.5 mg po BID    (I10) Essential hypertension  (I48.20) Chronic atrial fibrillation  Comment: with pacemaker. Family electing no remote checks at this time  Plan:   -Eliquis 2.5 mg po BID  -Toprol-XL 25 mg po daily   -monthly weights and VS with visits  -BMP periodically     (M15.0) Primary osteoarthritis involving multiple joints  (M81.0) Age-related osteoporosis without current pathological fracture  Comment:  chronic  Plan:   -Fosamax weekly (not on calcium/D supp related to constipation?)  -Tylenol 500 mg po q6H prn for pain    (K59.01) Slow transit constipation  Comment: hx of constipation/obstipation   Plan:   -Senna S 1 tablet BID and add 1 tablet BID PRN  -Miralax 17g po every other day and hold for loose stools  -dulcolax supp daily prn    (H40.9) Glaucoma of right eye, unspecified glaucoma type  (H35.30) Macular degeneration (senile) of retina  Comment: stable  Plan:   -Preservision 1 capsule po BID  -travoprost drops at HS  -Azopt drops daily    (G47.00) Insomnia, unspecified type  Comment: stable  Plan:   -Trazodone 150 mg po daily at HS  -melatonin 5 mg po daily at HS    (E53.8) Vitamin B12 deficiency   Comment: last level WNL  Plan:  -continue supplement    (E03.9) Hypothyroidism, unspecified  Comment: chronic  Plan:  Follow TSH  -levothyroxine 75 mcg po daily      Electronically signed by:  ZAFAR Bae CNP

## 2020-01-16 ENCOUNTER — AMBULATORY - HEALTHEAST (OUTPATIENT)
Dept: OTHER | Facility: CLINIC | Age: 85
End: 2020-01-16

## 2020-01-16 ENCOUNTER — DOCUMENTATION ONLY (OUTPATIENT)
Dept: OTHER | Facility: CLINIC | Age: 85
End: 2020-01-16

## 2020-01-18 VITALS
DIASTOLIC BLOOD PRESSURE: 68 MMHG | RESPIRATION RATE: 16 BRPM | TEMPERATURE: 98.7 F | OXYGEN SATURATION: 95 % | HEART RATE: 81 BPM | SYSTOLIC BLOOD PRESSURE: 167 MMHG

## 2020-01-18 PROBLEM — H35.30 MACULAR DEGENERATION (SENILE) OF RETINA: Status: ACTIVE | Noted: 2020-01-18

## 2020-01-18 PROBLEM — M81.0 AGE-RELATED OSTEOPOROSIS WITHOUT CURRENT PATHOLOGICAL FRACTURE: Status: ACTIVE | Noted: 2020-01-18

## 2020-01-18 PROBLEM — G47.00 INSOMNIA, UNSPECIFIED TYPE: Status: ACTIVE | Noted: 2020-01-18

## 2020-01-18 PROBLEM — I48.20 CHRONIC ATRIAL FIBRILLATION (H): Status: ACTIVE | Noted: 2020-01-18

## 2020-01-18 PROBLEM — H40.9 GLAUCOMA OF RIGHT EYE, UNSPECIFIED GLAUCOMA TYPE: Status: ACTIVE | Noted: 2020-01-18

## 2020-01-18 PROBLEM — E53.8 VITAMIN B 12 DEFICIENCY: Status: ACTIVE | Noted: 2020-01-18

## 2020-01-18 PROBLEM — K59.01 SLOW TRANSIT CONSTIPATION: Status: ACTIVE | Noted: 2020-01-18

## 2020-01-18 PROBLEM — M15.0 PRIMARY OSTEOARTHRITIS INVOLVING MULTIPLE JOINTS: Status: ACTIVE | Noted: 2020-01-18

## 2020-01-18 RX ORDER — POLYETHYLENE GLYCOL 3350 17 G/17G
1 POWDER, FOR SOLUTION ORAL EVERY OTHER DAY
Qty: 17 G | Refills: 11 | Status: SHIPPED | OUTPATIENT
Start: 2020-01-18 | End: 2020-06-10

## 2020-01-18 RX ORDER — BISACODYL 10 MG
10 SUPPOSITORY, RECTAL RECTAL DAILY PRN
Qty: 10 SUPPOSITORY | Refills: 11 | Status: SHIPPED | OUTPATIENT
Start: 2020-01-18 | End: 2021-05-14

## 2020-01-18 RX ORDER — AMOXICILLIN 250 MG
1 CAPSULE ORAL 2 TIMES DAILY
Qty: 60 TABLET | Refills: 0 | Status: SHIPPED | OUTPATIENT
Start: 2020-01-18 | End: 2020-02-19

## 2020-02-11 DIAGNOSIS — M15.0 PRIMARY OSTEOARTHRITIS INVOLVING MULTIPLE JOINTS: Primary | ICD-10-CM

## 2020-02-11 DIAGNOSIS — E53.8 VITAMIN B 12 DEFICIENCY: ICD-10-CM

## 2020-02-11 DIAGNOSIS — G47.00 INSOMNIA, UNSPECIFIED TYPE: ICD-10-CM

## 2020-02-12 RX ORDER — PSYLLIUM HUSK 0.4 G
CAPSULE ORAL
Qty: 37 TABLET | Refills: 97 | Status: SHIPPED | OUTPATIENT
Start: 2020-02-12 | End: 2021-02-22

## 2020-02-12 RX ORDER — ALENDRONATE SODIUM 70 MG/1
TABLET ORAL
Qty: 4 TABLET | Refills: 97 | Status: SHIPPED | OUTPATIENT
Start: 2020-02-12 | End: 2020-07-01

## 2020-02-12 RX ORDER — MULTIVITAMIN WITH IRON
TABLET ORAL
Qty: 37 TABLET | Refills: 97 | Status: SHIPPED | OUTPATIENT
Start: 2020-02-12 | End: 2021-02-22

## 2020-02-13 DIAGNOSIS — I48.20 CHRONIC ATRIAL FIBRILLATION (H): Primary | ICD-10-CM

## 2020-02-13 DIAGNOSIS — H35.30 MACULAR DEGENERATION (SENILE) OF RETINA: ICD-10-CM

## 2020-02-14 RX ORDER — VIT A/VIT C/VIT E/ZINC/COPPER 4296-226
CAPSULE ORAL
Qty: 60 CAPSULE | Refills: 11 | Status: SHIPPED | OUTPATIENT
Start: 2020-02-14 | End: 2021-01-25

## 2020-02-14 RX ORDER — APIXABAN 2.5 MG/1
TABLET, FILM COATED ORAL
Qty: 80 TABLET | Refills: 11 | Status: SHIPPED | OUTPATIENT
Start: 2020-02-14 | End: 2021-02-22

## 2020-02-18 ASSESSMENT — MIFFLIN-ST. JEOR: SCORE: 1067.31

## 2020-02-18 NOTE — PROGRESS NOTES
[facility]  [facility] Record Number:  [id number]  Place of Service where encounter took place:  No question data found.  Chief Complaint   Patient presents with     Nursing Home Acute       HPI:    Annabel May  is a 85 year old (5/12/1934), who is being seen today for an episodic care visit.  HPI information obtained from: facility chart records and facility staff. Today's concern is:    Seen today for increase of anxiety and agitation. Newer to memory care (1/14/2020). Was admitted from hospital course after treatment for acute metabolic encephalopathy secondary to UTI, worsening dementia needing higher level of care post hospitalization. History of dementia, HTN, hypothyroidism, glaucoma, afib, anxiety, depression, breast cancer.    She had an increase to Seroquel prior to admission. Not good bowel records and hx of constipation so could be contributor along with UTI? Inpatient psych consult 1/8 with recommendations to consider Seroquel to TID or afternoon increase to 50 mg po daily.     Past Medical and Surgical History reviewed in Epic today.    MEDICATIONS:  Current Outpatient Medications   Medication Sig Dispense Refill     acetaminophen (TYLENOL) 500 MG tablet Take 500 mg by mouth every 6 hours as needed for mild pain       alendronate (FOSAMAX) 70 MG tablet TAKE ONE TABLET BY MOUTH EVERY 7 DAYS ON SUNDAY 4 tablet 97     bisacodyl (DULCOLAX) 10 MG suppository Place 1 suppository (10 mg) rectally daily as needed for constipation Give if no record bowel movement x 3 days 10 suppository 11     brinzolamide (AZOPT) 1 % ophthalmic susp Place 1 drop into both eyes 2 times daily        ELIQUIS ANTICOAGULANT 2.5 MG tablet TAKE 1 TABLET BY MOUTH TWICE DAILY 80 tablet 11     levothyroxine (SYNTHROID/LEVOTHROID) 75 MCG tablet Take 75 mcg by mouth daily       MELATONIN MAXIMUM STRENGTH 5 MG tablet TAKE 1 TABLET BY MOUTH AT BEDTIME 37 tablet 97     metoprolol (TOPROL-XL) 25 MG 24 hr tablet Take  "25 mg by mouth daily        Multiple Vitamins-Minerals (PRESERVISION AREDS) CAPS TAKE 1 CAPSULE BY MOUTH TWICE DAILY 60 capsule 11     polyethylene glycol (MIRALAX/GLYCOLAX) packet Take 17 g by mouth every other day Hold for loose stools 17 g 11     QUEtiapine (SEROQUEL) 25 MG tablet Take 1 tablet (25 mg) by mouth 2 times daily as needed (agitation) 30 tablet 0     QUEtiapine 25 MG PO tablet Take 1.5 tablets (37.5 mg) by mouth 3 times daily 90 tablet 0     senna-docusate 8.6-50 MG PO tablet Take 2 tablets by mouth At Bedtime AND 1 tablet every morning. And may have 1 tablet daily  prn for constipation 60 tablet 0     travoprost, BAK Free, (TRAVATAN Z) 0.004 % ophthalmic solution Place 1 drop into both eyes At Bedtime        traZODone (DESYREL) 50 MG tablet Take 150 mg by mouth At Bedtime        vitamin B-12 (CYANOCOBALAMIN) 100 MCG tablet TAKE 1 TABLET BY MOUTH ONCE DAILY 37 tablet 97     REVIEW OF SYSTEMS:  Limited secondary to cognitive impairment but today pt reports ok    Objective:  BP (!) 140/92   Pulse 82   Temp 99.3  F (37.4  C)   Resp 16   Ht 1.702 m (5' 7\")   Wt 59 kg (130 lb)   SpO2 97%   BMI 20.36 kg/m    Exam:  GENERAL APPEARANCE:  Alert, in no distress, son present  ENT:  Mouth and posterior oropharynx normal, moist mucous membranes, normal hearing acuity  EYES:  EOM, conjunctivae, lids, pupils and irises normal  RESP:  respiratory effort and palpation of chest normal, lungs clear to auscultation   CV:  Palpation and auscultation of heart done , regular rate and rhythm, no murmur, rub, or gallop  ABDOMEN:  normal bowel sounds, soft, nontender  M/S:   Gait and station at baseline w/o assistive device   SKIN:  Inspection of skin and subcutaneous tissue baseline to visualized areas. Cyst/nodule hard on index finger right hand  NEURO:   Cranial nerves 2-12 are normal tested and grossly at patient's baseline  PSYCH:  insight and judgement impaired, memory impaired     Labs:   CBC RESULTS:   Recent " Labs   Lab Test 01/06/20  1530 12/04/19  1421   WBC 7.9 8.1   RBC 4.70 4.43   HGB 14.7 14.0   HCT 44.3 42.5   MCV 94 96   MCH 31.3 31.6   MCHC 33.2 32.9   RDW 13.8 13.5    163       Last Basic Metabolic Panel:  Recent Labs   Lab Test 01/12/20  0831 01/09/20  0903 01/06/20  1530 12/04/19  1421   NA  --   --  141 140   POTASSIUM  --   --  3.8 4.5   CHLORIDE  --   --  111* 111*   AMERICO  --   --  9.9 8.8   CO2  --   --  23 23   BUN  --   --  22 19   CR 1.09* 0.97 1.02 1.06*   GLC  --   --  93 95       Liver Function Studies -   Recent Labs   Lab Test 01/06/20  1530 07/10/14 08/03/12  0847   PROTTOTAL 7.7 6.9 6.6*   ALBUMIN 4.4  --  4.0   BILITOTAL 0.9 0.6 0.7   ALKPHOS 66 77 66   AST 31 19 21   ALT 29 10 19       TSH   Date Value Ref Range Status   07/10/2014 0.98 0.35 - 4.94 mcU/mL Final   08/03/2012 6.61 (H) 0.4 - 5.0 mU/L Final       TSH on 10/2/2019:                   2.21  Vitamin D total 10/2/2019:       46.1  Vitamin B12 10/2/2019:          368      No results found for: A1C    ASSESSMENT/PLAN:   (F03.91) Dementia with behavioral disturbance, unspecified dementia type (H)  (primary encounter diagnosis)  (R45.1) Agitation  (F41.9) Anxiety  Comment: escalation of behaviors. History of anxiety and depression-now off medication. Prior use of nortriptyline and Zoloft    Plan:   -Seroquel 25 mg po BID prn for agitation  -Seroquel 37.5 mg po BID moving to TID  -UA/UC for possible contributor to agitation     (K59.01) Slow transit constipation  Comment: use of prn   Plan:  -increase to 2 tabs of Senna S at HS continue am and prn dose  -monitor for improvement            Electronically signed by:  Reese Morfin, APRN CNP

## 2020-02-19 ENCOUNTER — ASSISTED LIVING VISIT (OUTPATIENT)
Dept: GERIATRICS | Facility: CLINIC | Age: 85
End: 2020-02-19
Payer: MEDICARE

## 2020-02-19 VITALS
DIASTOLIC BLOOD PRESSURE: 92 MMHG | RESPIRATION RATE: 16 BRPM | TEMPERATURE: 99.3 F | OXYGEN SATURATION: 97 % | SYSTOLIC BLOOD PRESSURE: 140 MMHG | WEIGHT: 130 LBS | HEIGHT: 67 IN | BODY MASS INDEX: 20.4 KG/M2 | HEART RATE: 82 BPM

## 2020-02-19 DIAGNOSIS — F41.9 ANXIETY: ICD-10-CM

## 2020-02-19 DIAGNOSIS — R45.1 AGITATION: ICD-10-CM

## 2020-02-19 DIAGNOSIS — K59.01 SLOW TRANSIT CONSTIPATION: ICD-10-CM

## 2020-02-19 DIAGNOSIS — F03.91 DEMENTIA WITH BEHAVIORAL DISTURBANCE, UNSPECIFIED DEMENTIA TYPE: Primary | ICD-10-CM

## 2020-02-19 RX ORDER — AMOXICILLIN 250 MG
CAPSULE ORAL
Qty: 60 TABLET | Refills: 0 | Status: SHIPPED | OUTPATIENT
Start: 2020-02-19 | End: 2020-05-15

## 2020-02-19 RX ORDER — QUETIAPINE FUMARATE 25 MG/1
37.5 TABLET, FILM COATED ORAL 3 TIMES DAILY
Qty: 90 TABLET | Refills: 0 | Status: SHIPPED | OUTPATIENT
Start: 2020-02-19 | End: 2020-04-17

## 2020-02-19 NOTE — LETTER
2/19/2020        RE: Annabel May  [address]  [address]  [place] [address]        [facility]  [facility] Record Number:  [id number]  Place of Service where encounter took place:  No question data found.  Chief Complaint   Patient presents with     Nursing Home Acute       HPI:    Annabel May  is a 85 year old (5/12/1934), who is being seen today for an episodic care visit.  HPI information obtained from: facility chart records and facility staff. Today's concern is:    Seen today for increase of anxiety and agitation. Newer to memory care (1/14/2020). Was admitted from hospital course after treatment for acute metabolic encephalopathy secondary to UTI, worsening dementia needing higher level of care post hospitalization. History of dementia, HTN, hypothyroidism, glaucoma, afib, anxiety, depression, breast cancer.    She had an increase to Seroquel prior to admission. Not good bowel records and hx of constipation so could be contributor along with UTI? Inpatient psych consult 1/8 with recommendations to consider Seroquel to TID or afternoon increase to 50 mg po daily.     Past Medical and Surgical History reviewed in Epic today.    MEDICATIONS:  Current Outpatient Medications   Medication Sig Dispense Refill     acetaminophen (TYLENOL) 500 MG tablet Take 500 mg by mouth every 6 hours as needed for mild pain       alendronate (FOSAMAX) 70 MG tablet TAKE ONE TABLET BY MOUTH EVERY 7 DAYS ON SUNDAY 4 tablet 97     bisacodyl (DULCOLAX) 10 MG suppository Place 1 suppository (10 mg) rectally daily as needed for constipation Give if no record bowel movement x 3 days 10 suppository 11     brinzolamide (AZOPT) 1 % ophthalmic susp Place 1 drop into both eyes 2 times daily        ELIQUIS ANTICOAGULANT 2.5 MG tablet TAKE 1 TABLET BY MOUTH TWICE DAILY 80 tablet 11     levothyroxine (SYNTHROID/LEVOTHROID) 75 MCG tablet Take 75 mcg by mouth daily       MELATONIN MAXIMUM STRENGTH 5 MG  "tablet TAKE 1 TABLET BY MOUTH AT BEDTIME 37 tablet 97     metoprolol (TOPROL-XL) 25 MG 24 hr tablet Take 25 mg by mouth daily        Multiple Vitamins-Minerals (PRESERVISION AREDS) CAPS TAKE 1 CAPSULE BY MOUTH TWICE DAILY 60 capsule 11     polyethylene glycol (MIRALAX/GLYCOLAX) packet Take 17 g by mouth every other day Hold for loose stools 17 g 11     QUEtiapine (SEROQUEL) 25 MG tablet Take 1 tablet (25 mg) by mouth 2 times daily as needed (agitation) 30 tablet 0     QUEtiapine 25 MG PO tablet Take 1.5 tablets (37.5 mg) by mouth 3 times daily 90 tablet 0     senna-docusate 8.6-50 MG PO tablet Take 2 tablets by mouth At Bedtime AND 1 tablet every morning. And may have 1 tablet daily  prn for constipation 60 tablet 0     travoprost, BAK Free, (TRAVATAN Z) 0.004 % ophthalmic solution Place 1 drop into both eyes At Bedtime        traZODone (DESYREL) 50 MG tablet Take 150 mg by mouth At Bedtime        vitamin B-12 (CYANOCOBALAMIN) 100 MCG tablet TAKE 1 TABLET BY MOUTH ONCE DAILY 37 tablet 97     REVIEW OF SYSTEMS:  Limited secondary to cognitive impairment but today pt reports ok    Objective:  BP (!) 140/92   Pulse 82   Temp 99.3  F (37.4  C)   Resp 16   Ht 1.702 m (5' 7\")   Wt 59 kg (130 lb)   SpO2 97%   BMI 20.36 kg/m     Exam:  GENERAL APPEARANCE:  Alert, in no distress, son present  ENT:  Mouth and posterior oropharynx normal, moist mucous membranes, normal hearing acuity  EYES:  EOM, conjunctivae, lids, pupils and irises normal  RESP:  respiratory effort and palpation of chest normal, lungs clear to auscultation   CV:  Palpation and auscultation of heart done , regular rate and rhythm, no murmur, rub, or gallop  ABDOMEN:  normal bowel sounds, soft, nontender  M/S:   Gait and station at baseline w/o assistive device   SKIN:  Inspection of skin and subcutaneous tissue baseline to visualized areas. Cyst/nodule hard on index finger right hand  NEURO:   Cranial nerves 2-12 are normal tested and grossly at " patient's baseline  PSYCH:  insight and judgement impaired, memory impaired     Labs:   CBC RESULTS:   Recent Labs   Lab Test 01/06/20  1530 12/04/19  1421   WBC 7.9 8.1   RBC 4.70 4.43   HGB 14.7 14.0   HCT 44.3 42.5   MCV 94 96   MCH 31.3 31.6   MCHC 33.2 32.9   RDW 13.8 13.5    163       Last Basic Metabolic Panel:  Recent Labs   Lab Test 01/12/20  0831 01/09/20  0903 01/06/20  1530 12/04/19  1421   NA  --   --  141 140   POTASSIUM  --   --  3.8 4.5   CHLORIDE  --   --  111* 111*   AMERICO  --   --  9.9 8.8   CO2  --   --  23 23   BUN  --   --  22 19   CR 1.09* 0.97 1.02 1.06*   GLC  --   --  93 95       Liver Function Studies -   Recent Labs   Lab Test 01/06/20  1530 07/10/14 08/03/12  0847   PROTTOTAL 7.7 6.9 6.6*   ALBUMIN 4.4  --  4.0   BILITOTAL 0.9 0.6 0.7   ALKPHOS 66 77 66   AST 31 19 21   ALT 29 10 19       TSH   Date Value Ref Range Status   07/10/2014 0.98 0.35 - 4.94 mcU/mL Final   08/03/2012 6.61 (H) 0.4 - 5.0 mU/L Final       TSH on 10/2/2019:                   2.21  Vitamin D total 10/2/2019:       46.1  Vitamin B12 10/2/2019:          368      No results found for: A1C    ASSESSMENT/PLAN:   (F03.91) Dementia with behavioral disturbance, unspecified dementia type (H)  (primary encounter diagnosis)  (R45.1) Agitation  (F41.9) Anxiety  Comment: escalation of behaviors. History of anxiety and depression-now off medication. Prior use of nortriptyline and Zoloft    Plan:   -Seroquel 25 mg po BID prn for agitation  -Seroquel 37.5 mg po BID moving to TID  -UA/UC for possible contributor to agitation     (K59.01) Slow transit constipation  Comment: use of prn   Plan:  -increase to 2 tabs of Senna S at HS continue am and prn dose  -monitor for improvement            Electronically signed by:  ZAFAR Bae CNP             Sincerely,        ZAFAR Bae CNP

## 2020-02-20 ENCOUNTER — HOSPITAL LABORATORY (OUTPATIENT)
Dept: OTHER | Facility: CLINIC | Age: 85
End: 2020-02-20

## 2020-02-20 LAB
ALBUMIN UR-MCNC: NEGATIVE MG/DL
APPEARANCE UR: CLEAR
BILIRUB UR QL STRIP: NEGATIVE
COLOR UR AUTO: ABNORMAL
GLUCOSE UR STRIP-MCNC: NEGATIVE MG/DL
HGB UR QL STRIP: NEGATIVE
KETONES UR STRIP-MCNC: NEGATIVE MG/DL
LEUKOCYTE ESTERASE UR QL STRIP: ABNORMAL
NITRATE UR QL: NEGATIVE
PH UR STRIP: 6 PH (ref 5–7)
RBC #/AREA URNS AUTO: 17 /HPF (ref 0–2)
SOURCE: ABNORMAL
SP GR UR STRIP: 1.01 (ref 1–1.03)
UROBILINOGEN UR STRIP-MCNC: NORMAL MG/DL (ref 0–2)
WBC #/AREA URNS AUTO: 2 /HPF (ref 0–5)

## 2020-02-21 LAB
BACTERIA SPEC CULT: NORMAL
Lab: NORMAL
SPECIMEN SOURCE: NORMAL

## 2020-02-26 ENCOUNTER — OFFICE VISIT (OUTPATIENT)
Dept: PHARMACY | Facility: CLINIC | Age: 85
End: 2020-02-26
Payer: COMMERCIAL

## 2020-02-26 DIAGNOSIS — F41.9 ANXIETY: ICD-10-CM

## 2020-02-26 DIAGNOSIS — G47.00 INSOMNIA, UNSPECIFIED TYPE: ICD-10-CM

## 2020-02-26 DIAGNOSIS — M81.0 AGE-RELATED OSTEOPOROSIS WITHOUT CURRENT PATHOLOGICAL FRACTURE: ICD-10-CM

## 2020-02-26 DIAGNOSIS — E03.9 HYPOTHYROIDISM, UNSPECIFIED TYPE: ICD-10-CM

## 2020-02-26 DIAGNOSIS — I48.20 CHRONIC ATRIAL FIBRILLATION (H): ICD-10-CM

## 2020-02-26 DIAGNOSIS — E53.8 VITAMIN B 12 DEFICIENCY: ICD-10-CM

## 2020-02-26 DIAGNOSIS — I10 ESSENTIAL HYPERTENSION: ICD-10-CM

## 2020-02-26 DIAGNOSIS — F03.91 DEMENTIA WITH BEHAVIORAL DISTURBANCE, UNSPECIFIED DEMENTIA TYPE: Primary | ICD-10-CM

## 2020-02-26 PROCEDURE — 99607 MTMS BY PHARM ADDL 15 MIN: CPT | Performed by: PHARMACIST

## 2020-02-26 PROCEDURE — 99605 MTMS BY PHARM NP 15 MIN: CPT | Performed by: PHARMACIST

## 2020-02-26 RX ORDER — TRAZODONE HYDROCHLORIDE 100 MG/1
100 TABLET ORAL AT BEDTIME
COMMUNITY
End: 2020-03-23

## 2020-02-26 NOTE — PROGRESS NOTES
MTM ENCOUNTER  SUBJECTIVE/OBJECTIVE:                           Annabel May is a 85 year old female seen for an initial visit. She was referred to me from Reese Morfin NP.  Spoke with Reese as well as nursing staff, and patient's son as well.  Consent to communicate on file.     Chief Complaint: initial med review.  NP states main concern is agitation/anxiety    Allergies/ADRs: Reviewed in Epic  Tobacco:  reports that she has never smoked. She has never used smokeless tobacco.  Alcohol: not currently using  Caffeine: no caffeine  Activity: has a 4-wheel walker; was not using this when I saw her today  PMH: Reviewed in Epic    Medication Adherence/Access: Patient has assistance with medication administration: assisted living.    Dementia w/ behavioral disturbance, Agitation/Anxiety, Insomnia:  Current medications include Quetiapine 37.5mg three times daily and 25mg twice daily as needed, Trazodone 150mg at bedtime, Melatonin 5mg at bedtime.  Recent hospitalization due to increasing agitation possibly exacerbated by UTI and constipation.  Appears per chart review that Quetiapine was initiated 11/2019, and dose increased during hospitalization, and last increased on 2/19/20 from 37.5mg bid to tid.  When I meet with her today, she is standing at her doorway and is very distressed and anxious.  She is worried I am there to take pictures of her family away from her (son notes that this is a consistent belief of hers - will take pictures down every day because she is worried that people are coming to take her pictures).  When I tell her that I'm not going to take her pictures, she is very happy.  I noted that she had blood on her hand and tip of her nose, on her comforter, floor, pictures.  Pt determined to have a bloody nose and was cleaned up by nursing staff.  Chart review indicates pt with h/o Sertraline and Nortriptyline use.  Unclear to me when these were stopped, and son is unsure as well.  Nursing staff  "reports that pt walks around all day and night crying, very upset.  Not sleeping.  When her son comes to visit, staff reports she is happy and communicates well with him and will walk halls with him.  She does tell me today that \"I can not lose my son.\"  Her son reports that she has auditory hallucinations that are worse when she is alone, not gone, but not as bad when she is with other people.  Son states goal is to \"dampen her fear and anxiety, even if it leads to being somewhat sedated.\"      Afib/HTN: Patient is currently taking Eliquis 2.5mg BID for anticoagulation. As noted above, pt did have a nose bleed today when I arrived at visit. Patient does not have a hx of GI bleed.   Patient is also taking Metoprolol ER 25mg daily.   BP Readings from Last 3 Encounters:   02/19/20 (!) 140/92   01/18/20 (!) 167/68   01/14/20 136/78     Osteoporosis: Current therapy includes: alendronate (Fosamax) 70mg weekly (Pt has been on current therapy for at least 8-9 years). Due to cognitive impairment, difficult to know if pt is experiencing side effects.   Last vitamin D level: 10/2/19 = 46.1  DEXA History: 12/13/19:  T score -2.6 at femoral neck. The patient's 10 year probability of suffering a major osteoporotic fracture is 18 % and the 10 year probability of suffering a hip fracture is 6.8 %   Risk factors: post-menopausal, frail, h/o fall    Hypothyroidism: Patient is taking levothyroxine 75 mcg daily.    TSH on 10/2/19 = 2.21    B12 deficiency: Current medication includes vitamin B12 100mcg daily.  Of note, appears that prior to hospitalization on 1/6/20, pt was taking B12 1000mcg daily (see 12/19/19 office visit).  However, at the admission med history interview on 1/6/20 upon hospitalization, pharmacist was presented a written list and added B12 100mcg daily to admission meds.  B12 level on 10/2/19 = 368.      Today's Vitals: There were no vitals taken for this visit.      Est CrCl ([place]-Gault) = 38ml/min (based on " Scr 1.02 & actual body weight 59kg)    ASSESSMENT:                              Medication Adherence: good, no issues identified    Dementia w/ behavioral disturbance, Agitation/Anxiety, Insomnia:  Pt may benefit from addition of Mirtazapine for sleep, anxiety/agitation, crying episodes.  May also benefit from reduction in Trazodone dose as this doesn't seem to have been helping.  Son agreeable, and is questioning benefit of Quetiapine as he doesn't think it has been that helpful, and possible that it is worsening confusion, however, do not want to make too many changes at once with the addition of the Mirtazapine.      Afib/HTN:  Stable.  Monitor for further bleeding episodes/nose bleeds; Eliquis may contribute.  BP at goal <150-160/90mmHg.  This is a reasonable goal given severe dementia, risk of falls.    Osteoporosis: Stable.  Pt has been on Alendronate >5yrs, but remains high fracture risk based on FRAX score.  Is high fall risk.  Current CrCl 38ml/min, and Alendronate should be avoided when <35ml/min.  Continue to monitor kidney function.     Hypothyroidism: Stable.  Goal TSH in elderly is 4-6 due to higher normal TSH levels in elderly, and to reduce risk of side effects associated with levothyroxine, such as anxiety, afib/arrhythmia, low BMD/inc fracture risk.  With next TSH check, if still well below 4, may consider reduction in levothyroxine dose since this pt does have a h/o anxiety/agitation, afib, osteoprosis/high fracture risk.    B12 deficiency: Appears that there may have been an error upon hospital admission on 1/6/20, and instead of vitamin B12 1000mcg orally daily being started, 100mcg daily was initiated.  Typically 1000mcg daily is required when administered orally.  May benefit from f/u level to ensure appropriateness of dose change.      PLAN:                              1.  Begin Mirtazapine 7.5mg at bedtime.  Monitor mood, anxiety/agitation, sleep.    2.  Reduce Trazodone to 100mg nightly at  bedtime.    3.  Consider d'c of vitamin B12 100mcg daily and begin 1000mcg orally daily, and f/u B12 level in one month.    Future considerations:  If TSH remains well below 4-6, may benefit from reduction in levothyroxine dose.      I spent 30 minutes with this patient today. A copy of the visit note was provided to the patient's referring provider.    Will follow up in 1 month, sooner if necessary.    The patient was not given a summary of these recommendations due to cognitive impairment.     Marci Taylor, Pharm.D.,[facility] [place]  Board Certified Geriatric Pharmacist  Medication Therapy Management Pharmacist  [phone]

## 2020-02-27 RX ORDER — MIRTAZAPINE 7.5 MG/1
7.5 TABLET, FILM COATED ORAL AT BEDTIME
COMMUNITY
End: 2020-03-23

## 2020-02-28 ENCOUNTER — HOSPITAL ENCOUNTER (EMERGENCY)
Facility: CLINIC | Age: 85
Discharge: HOME | End: 2020-02-28
Attending: EMERGENCY MEDICINE | Admitting: EMERGENCY MEDICINE
Payer: MEDICARE

## 2020-02-28 ENCOUNTER — APPOINTMENT (OUTPATIENT)
Dept: CT IMAGING | Facility: CLINIC | Age: 85
End: 2020-02-28
Attending: EMERGENCY MEDICINE
Payer: MEDICARE

## 2020-02-28 ENCOUNTER — APPOINTMENT (OUTPATIENT)
Dept: GENERAL RADIOLOGY | Facility: CLINIC | Age: 85
End: 2020-02-28
Attending: EMERGENCY MEDICINE
Payer: MEDICARE

## 2020-02-28 VITALS
TEMPERATURE: 98.2 F | SYSTOLIC BLOOD PRESSURE: 136 MMHG | HEART RATE: 89 BPM | RESPIRATION RATE: 20 BRPM | DIASTOLIC BLOOD PRESSURE: 94 MMHG | HEIGHT: 65 IN | BODY MASS INDEX: 24.16 KG/M2 | WEIGHT: 145 LBS | OXYGEN SATURATION: 99 %

## 2020-02-28 DIAGNOSIS — F03.91 DEMENTIA WITH BEHAVIORAL DISTURBANCE, UNSPECIFIED DEMENTIA TYPE: ICD-10-CM

## 2020-02-28 DIAGNOSIS — S09.90XA CLOSED HEAD INJURY, INITIAL ENCOUNTER: ICD-10-CM

## 2020-02-28 DIAGNOSIS — Z79.01 LONG TERM CURRENT USE OF ANTICOAGULANT THERAPY: ICD-10-CM

## 2020-02-28 LAB
ANION GAP SERPL CALCULATED.3IONS-SCNC: 4 MMOL/L (ref 3–14)
BASOPHILS # BLD AUTO: 0 10E9/L (ref 0–0.2)
BASOPHILS NFR BLD AUTO: 0.2 %
BUN SERPL-MCNC: 22 MG/DL (ref 7–30)
CALCIUM SERPL-MCNC: 9.3 MG/DL (ref 8.5–10.1)
CHLORIDE SERPL-SCNC: 109 MMOL/L (ref 94–109)
CO2 SERPL-SCNC: 23 MMOL/L (ref 20–32)
CREAT SERPL-MCNC: 0.95 MG/DL (ref 0.52–1.04)
DIFFERENTIAL METHOD BLD: NORMAL
EOSINOPHIL # BLD AUTO: 0.1 10E9/L (ref 0–0.7)
EOSINOPHIL NFR BLD AUTO: 0.7 %
ERYTHROCYTE [DISTWIDTH] IN BLOOD BY AUTOMATED COUNT: 14.2 % (ref 10–15)
GFR SERPL CREATININE-BSD FRML MDRD: 55 ML/MIN/{1.73_M2}
GLUCOSE SERPL-MCNC: 78 MG/DL (ref 70–99)
HCT VFR BLD AUTO: 40.1 % (ref 35–47)
HGB BLD-MCNC: 13.3 G/DL (ref 11.7–15.7)
IMM GRANULOCYTES # BLD: 0 10E9/L (ref 0–0.4)
IMM GRANULOCYTES NFR BLD: 0.2 %
INTERPRETATION ECG - MUSE: NORMAL
LYMPHOCYTES # BLD AUTO: 1.7 10E9/L (ref 0.8–5.3)
LYMPHOCYTES NFR BLD AUTO: 19.6 %
MCH RBC QN AUTO: 32 PG (ref 26.5–33)
MCHC RBC AUTO-ENTMCNC: 33.2 G/DL (ref 31.5–36.5)
MCV RBC AUTO: 97 FL (ref 78–100)
MONOCYTES # BLD AUTO: 0.7 10E9/L (ref 0–1.3)
MONOCYTES NFR BLD AUTO: 7.7 %
NEUTROPHILS # BLD AUTO: 6.2 10E9/L (ref 1.6–8.3)
NEUTROPHILS NFR BLD AUTO: 71.6 %
NRBC # BLD AUTO: 0 10*3/UL
NRBC BLD AUTO-RTO: 0 /100
PLATELET # BLD AUTO: 170 10E9/L (ref 150–450)
POTASSIUM SERPL-SCNC: 4.8 MMOL/L (ref 3.4–5.3)
RBC # BLD AUTO: 4.15 10E12/L (ref 3.8–5.2)
SODIUM SERPL-SCNC: 136 MMOL/L (ref 133–144)
WBC # BLD AUTO: 8.6 10E9/L (ref 4–11)

## 2020-02-28 PROCEDURE — 70450 CT HEAD/BRAIN W/O DYE: CPT

## 2020-02-28 PROCEDURE — 72170 X-RAY EXAM OF PELVIS: CPT

## 2020-02-28 PROCEDURE — 93005 ELECTROCARDIOGRAM TRACING: CPT

## 2020-02-28 PROCEDURE — 85025 COMPLETE CBC W/AUTO DIFF WBC: CPT | Performed by: EMERGENCY MEDICINE

## 2020-02-28 PROCEDURE — 72100 X-RAY EXAM L-S SPINE 2/3 VWS: CPT

## 2020-02-28 PROCEDURE — 80048 BASIC METABOLIC PNL TOTAL CA: CPT | Performed by: EMERGENCY MEDICINE

## 2020-02-28 PROCEDURE — 99285 EMERGENCY DEPT VISIT HI MDM: CPT | Mod: 25

## 2020-02-28 ASSESSMENT — MIFFLIN-ST. JEOR: SCORE: 1103.6

## 2020-02-28 ASSESSMENT — ENCOUNTER SYMPTOMS
HEADACHES: 1
BACK PAIN: 1

## 2020-02-28 NOTE — ED PROVIDER NOTES
History     Chief Complaint:  Fall      The history is provided by the EMS personnel. History limited by: dementia.      Annabel May is a 85 year old female, anticoagulated on Eliquis with history of Atrial Fibrillation, dementia, and hypertension who presents with via EMS following a fall with a head injury. She is coming from [facility]. This morning, patient told staff that she fell and hit the back of her head last night. Annabel states that she was walking in the dark and then fell. She was able to get herself back into bed. However, this morning, patient was complaining of head pain and so staff decided to send her to the emergency department because of her anticoagulant use. Additionally, she told EMS she had pain to her buttocks and to her lower back, and stating that she hit the front of her face as well. She has been at her baseline mentation for EMS.  Able to ambulate for EMS.  She now denies any symptoms other than her head.    Allergies:  Fentanyl  Midazolam  Vecuronium      Medications:    Fosamax   Eliquis   Levothyroxine   Melatonin   Metoprolol   Mirtazapine   Miralax  Seroquel   Trazodone      Past Medical History:    Arrhythmia   Arthritis   Breast cancer  Hyperlipidemia   Hypertension  Hypothyroidism  Migraines   Post-operative nausea and vomiting   Syncope   Dementia  Permanent Atrial Fibrillation   Osteoporosis     Past Surgical History:    Appendectomy   Breast surgery  Cholecystectomy   Partial thyroidectomy  Glaucoma surgery  Hysterectomy  Head and neck surgery  Phacoemulsification clear cornea w standard IOL implant, endoscopic cyclophotocoagulation combined     Family History:    History reviewed. No pertinent family history.      Social History:  The patient was accompanied to the ED by EMS.  Smoking Status: never  Smokeless Tobacco: never  Alcohol Use: yes   Marital Status:       Review of Systems   Unable to perform ROS: Dementia   Musculoskeletal: Positive  "for back pain.   Neurological: Positive for headaches.       Physical Exam     Patient Vitals for the past 24 hrs:   BP Temp Temp src Pulse Resp SpO2 Height Weight   02/28/20 0926 (!) 136/94 98.2  F (36.8  C) Axillary 89 20 100 % 1.651 m (5' 5\") 65.8 kg (145 lb)       Physical Exam  General: Nontoxic appearing woman sitting upright in room 14  HENT: face nontender with full painless ROM mandible, no bony deformity, OP clear, no difficulty controlling secretions, skull mildly tender posteriorly with mild soft tissue swelling  Eyes: PERRL without proptosis  CV: slightly irregular rhythm, cap refill normal in all extremities, no murmur audible  Resp: CTAB, normal effort, no crackles or wheezing  GI: abdomen soft,  nontender, no guarding  MSK:  Cervical spine:  no midline tenderness, FROM  Thoracic spine: no midline tenderness, no CVAT  Lumbar spine: no midline tenderness  Chest wall: nontender without crepitus  Pelvis stable  Extremities: nontender  Skin:   No abrasion  No ecchymosis  No laceration  Neuro: awake, alert, GCS 14 (baseline confusion) poor historian,  equal, lifts both legs off bed, responds appropriately to basic commands  Psych: cooperative, somewhat anxious at times but can be calmed and reassured by ED staff      Emergency Department Course     ECG:  Indication: Fall, dementia  Completed at 1012.  Read at 1019.   Atrial sensed ventricular paced rhythm with occasional premature ventricular complexes   Abnormal ECG   Rate 73 bpm. MA interval 188. QRS duration 154. QT/QTc 454/500. P-R-T axes 91 -81 80.    Imaging:  Radiology findings were communicated with the patient and family who voiced understanding of the findings.    CT Head w/o Contrast  IMPRESSION:     1. No evidence of acute intracranial hemorrhage, mass, or herniation.  2. Chronic changes as above.  Report per radiology     XR Lumbar Spine 2/3 Views  IMPRESSION:   1. No fractures are identified.  2. Multilevel degenerative changes.  Report " per radiology     XR Pelvis 1/2 Views  IMPRESSION: Advanced right and moderate left hip degenerative changes.  No radiographic evidence of acute fracture or subluxation. Moderate  lower lumbar degenerative changes.  Report per radiology     Laboratory:  Laboratory findings were communicated with the patient and family who voiced understanding of the findings.    CBC: AWNL. (WBC 8.6, HGB 13.3, )   BMP: GFR Estimate 55 (L) o/w WNL (Creatinine 0.95)      Emergency Department Course:  Past medical records, nursing notes, and vitals reviewed.    0919 I performed an exam of the patient as documented above.     EKG obtained in the ED, see results above.    IV was inserted and blood was drawn for laboratory testing, results above.  The patient was sent for a CT Head wo Contrast, XR Lumbar Spine, XR Pelvis while in the emergency department, results above.     1021 I called patient's son, Kelvin, regarding the patient's presentation to the emergency department, her findings, and her plan of care.    1024 I rechecked the patient and discussed the results of her workup thus far.     Findings and plan explained to the Patient and son. Patient discharged home with instructions regarding supportive care, medications, and reasons to return. The importance of close follow-up was reviewed.     I personally reviewed the laboratory and imaging results with the Patient and son and answered all related questions prior to discharge.     Impression & Plan     Medical Decision Making:  It seems most likely that this was a mechanical fall, though she is a sufficiently poor historian that this cannot be determined with certainty.  From a trauma standpoint, given evidence of head injury and anticoagulant use, head CT was indicated and fortunately does not reveal any intracranial hemorrhage or skull fracture.  Her mental status and exam is otherwise as at her baseline.  She may have had some transient lower back discomfort but x-rays in  this regard are benign.  Highly doubt aortic pathology, serious retroperitoneal hemorrhage, or other more dangerous condition based on current findings.  I also considered, but do not identify or suspect, cervical spine fracture, serious chest pathology, extremity fracture, or other more dangerous process.  While she is demented at baseline, she is awake, interactive, and following commands.  Spoke with her family by phone who strongly agrees with the plan for her to be escorted back to [place] to her memory care facility for ongoing cares.  Patient declined any other treatments here.  She is welcome back for crisis and should follow-up through her primary team soon for ongoing care and close recheck.    This record was created at least in part using electronic voice recognition software, so please excuse any typographical errors.     Diagnosis:    ICD-10-CM    1. Closed head injury, initial encounter S09.90XA    2. Long term current use of anticoagulant therapy Z79.01    3. Dementia with behavioral disturbance, unspecified dementia type (H) F03.91        Disposition:  Discharged to home.    Scribe Disclosure:  I, Jessica Stone, am serving as a scribe at 9:19 AM on 2/28/2020 to document services personally performed by Raffi Cuevas MD based on my observations and the provider's statements to me. 2/28/2020    EMERGENCY DEPARTMENT       Raffi Cuevas MD  02/28/20 9728

## 2020-02-28 NOTE — ED NOTES
Bed: ED14  Expected date:   Expected time:   Means of arrival:   Comments:  parish - 85 F fall head injury eta 0931

## 2020-02-28 NOTE — ED NOTES
Pt quite anxious, not understanding her children are out of state and they will not  be coming to the hospital . Used calm one directional approach.

## 2020-02-28 NOTE — ED AVS SNAPSHOT
Emergency Department  [address] [facility] [address]  Phone:  [phone]  Fax:  [phone]                                    Annabel May   MRN: [id number]    Department:   Emergency Department   Date of Visit:  2/28/2020           After Visit Summary Signature Page    I have received my discharge instructions, and my questions have been answered. I have discussed any challenges I see with this plan with the nurse or doctor.    ..........................................................................................................................................  Patient/Patient Representative Signature      ..........................................................................................................................................  Patient Representative Print Name and Relationship to Patient    ..................................................               ................................................  Date                                   Time    ..........................................................................................................................................  Reviewed by Signature/Title    ...................................................              ..............................................  Date                                               Time          22EPIC Rev 08/18

## 2020-03-23 DIAGNOSIS — E03.9 ACQUIRED HYPOTHYROIDISM: Primary | ICD-10-CM

## 2020-03-23 DIAGNOSIS — I10 BENIGN ESSENTIAL HYPERTENSION: ICD-10-CM

## 2020-03-23 DIAGNOSIS — F33.41 RECURRENT MAJOR DEPRESSION IN PARTIAL REMISSION (H): ICD-10-CM

## 2020-03-23 RX ORDER — LEVOTHYROXINE SODIUM 75 UG/1
TABLET ORAL
Qty: 28 TABLET | Status: SHIPPED | OUTPATIENT
Start: 2020-03-23 | End: 2020-11-25

## 2020-03-23 RX ORDER — METOPROLOL SUCCINATE 25 MG/1
TABLET, EXTENDED RELEASE ORAL
Qty: 28 TABLET | Status: SHIPPED | OUTPATIENT
Start: 2020-03-23 | End: 2021-03-18

## 2020-03-23 RX ORDER — TRAZODONE HYDROCHLORIDE 100 MG/1
TABLET ORAL
Qty: 28 TABLET | Status: SHIPPED | OUTPATIENT
Start: 2020-03-23 | End: 2020-07-01

## 2020-03-23 RX ORDER — MIRTAZAPINE 7.5 MG/1
TABLET, FILM COATED ORAL
Qty: 28 TABLET | Status: SHIPPED | OUTPATIENT
Start: 2020-03-23 | End: 2020-05-15

## 2020-04-09 ENCOUNTER — ALLIED HEALTH/NURSE VISIT (OUTPATIENT)
Dept: PHARMACY | Facility: CLINIC | Age: 85
End: 2020-04-09
Payer: COMMERCIAL

## 2020-04-09 DIAGNOSIS — F03.91 DEMENTIA WITH BEHAVIORAL DISTURBANCE, UNSPECIFIED DEMENTIA TYPE: Primary | ICD-10-CM

## 2020-04-09 DIAGNOSIS — F41.9 ANXIETY: ICD-10-CM

## 2020-04-09 DIAGNOSIS — G47.00 INSOMNIA, UNSPECIFIED TYPE: ICD-10-CM

## 2020-04-09 PROCEDURE — 99606 MTMS BY PHARM EST 15 MIN: CPT | Performed by: PHARMACIST

## 2020-04-09 PROCEDURE — 99607 MTMS BY PHARM ADDL 15 MIN: CPT | Performed by: PHARMACIST

## 2020-04-09 NOTE — PROGRESS NOTES
"MTM ENCOUNTER  SUBJECTIVE/OBJECTIVE:                           Annabel May is a 85 year old female called for a follow-up visit. She was referred to me from Reese Morfin, her NP.  I spoke with her son, Kelvin, due to pt's cognitive impairment (consent to communicate on file), and facility nursing staff as well.  Today's visit is a follow-up MTM visit from 2/26/20.     Patient consented to a telehealth visit: yes      Allergies/ADRs: Reviewed in Epic  Tobacco:  reports that she has never smoked. She has never used smokeless tobacco.  Alcohol: not currently using  Caffeine: no caffeine  Activity: has a 4-wheel walker, however unclear to me if she is using this regularly  PMH: Reviewed in Epic    Medication Adherence/Access: no issues reported  Patient has assistance with medication administration: assisted living.    Dementia w/ behavioral disturbance, Agitation/Anxiety, Insomnia:  Current medications include, Mirtazapine 7.5mg at bedtime, Quetiapine 37.5mg three times daily and 25mg twice daily as needed, Trazodone 100mg at bedtime, Melatonin 5mg at bedtime.  Pt had hospitalization in early January due to increasing agitation possibly exacerbated by UTI and constipation.  Appears per chart review that Quetiapine was initiated 11/2019, and dose increased during hospitalization, and last increased on 2/19/20 from 37.5mg bid to tid.  Since I last saw pt on 2/26, she did have a fall on 2/27/20 overnight, hitting her head, and was seen in ED on 2/28/20 for this.  On 2/28/20, NP initiated Mirtazapine 7.5mg at bedtime and reduced Trazodone from 150mg to 100mg nightly as recommended at last MTM visit (see 2/26/20 MTM visit).  Son reports today that anxiety has improved, \"although not gone\", and reports \"better conversations\" in the last few weeks, and feels she is \"a touch clearer and not as anxious.\"  Prior to this medication change, she was sleeping very poorly, which he feels impacted her anxiety and confusion.  Staff " tells me today that since med changes, once pt gets into bed for the night, she has not been getting up, which is an improvement.  I note in the facility records that patient has received prn Quetiapine one time over the last month on 4/1 due to pt becoming anxious, crying in the common area.  Staff report that this was effective.  Son is asking if scheduled Quetiapine dose could be reduced as he is concerned that this has increased confusion.    Today's Vitals: There were no vitals taken for this visit.      ASSESSMENT:                              Medication Adherence: good, no issues identified    Dementia w/ behavioral disturbance, Agitation/Anxiety, Insomnia:  Improved with addition of Mirtazapine.  Pt may benefit from GDR of Quetiapine from 37.5mg tid to 25mg tid and continue prn dosing.  Dose had been increased while hospitalized in January, and Quetiapine may increase confusion, rate of cognitive decline, falls.  In future, if necessary, could increase Mirtazapine to 15mg nightly, however pt has been doing well on 7.5mg dose and do not want to make too many changes at once.  May also consider further taper and d'c of Trazodone in future.  Son has been calling and talking to his mom and staff to check in on her, and is able to call me if any negative changes noted with potential reduction in Quetiapine dose.      PLAN:                            1.  Provider may consider reduction in Quetiapine from 37.5mg three times daily and 25mg twice daily as needed to 25mg three times daily and 25mg twice daily as needed.  Monitor target symptoms.     I spent 30 minutes with this patient's son and facility staff today. A copy of the visit note was provided to the patient's primary care provider.    Will follow up in 2 weeks, sooner if necessary.    The patient was not given a summary of these recommendations due to cognitive impairment.     Marci Taylor, Pharm.D.,[facility] [place]  Board Certified Geriatric Pharmacist  Medication  Therapy Management Pharmacist  [phone]

## 2020-04-09 NOTE — Clinical Note
Arsenio Leigh!  I spoke to Kelvin, pt's son today (and staff at facility) - see note.  He would like to try a small reduction in her Quetiapine and wants me to call him in a couple weeks to follow-up from this potential change.  Sounds like addition of mirtazapine at bedtime has been helpful.  Let me know what you think!  Thank you!

## 2020-04-10 ENCOUNTER — TELEPHONE (OUTPATIENT)
Dept: GERIATRICS | Facility: CLINIC | Age: 85
End: 2020-04-10

## 2020-04-10 NOTE — TELEPHONE ENCOUNTER
My colleague ZAFAR Sue CNP got this call earlier via [facility]. I spoke with her. Sounds to be anxiety and she had advised use of her current PRN Seroquel. If does not calm nursing staff had been told they can call Jessica back with questions. I had spoken with nursing staff as well who had no concerns and felt they had received all the recommendations they needed from ZAFAR Sue CNP. Closing encounter.    Brittaney Guerrero, DO

## 2020-04-10 NOTE — TELEPHONE ENCOUNTER
VM left on 4/10 at 11:37.    Message: Bernarda nurse from [facility] [address] is calling stating patient is having an issue with her pacemaker. Her HR is elevated and is causing the patient to feel uncomfortable in the chest area.    Call back number: [phone]    Routing to on call provider, PCP is out of office.    Serena Stone    [facility]

## 2020-04-17 DIAGNOSIS — F03.91 DEMENTIA WITH BEHAVIORAL DISTURBANCE, UNSPECIFIED DEMENTIA TYPE: ICD-10-CM

## 2020-04-17 RX ORDER — QUETIAPINE FUMARATE 25 MG/1
TABLET, FILM COATED ORAL
Qty: 126 TABLET | Status: SHIPPED | OUTPATIENT
Start: 2020-04-17 | End: 2020-04-23

## 2020-04-21 DIAGNOSIS — H40.50X0 GLAUCOMA ASSOCIATED WITH ANOMALIES OF IRIS: Primary | ICD-10-CM

## 2020-04-21 DIAGNOSIS — Q13.2 GLAUCOMA ASSOCIATED WITH ANOMALIES OF IRIS: Primary | ICD-10-CM

## 2020-04-21 RX ORDER — TRAVOPROST OPHTHALMIC SOLUTION 0.04 MG/ML
SOLUTION OPHTHALMIC
Qty: 2.5 ML | Refills: 97 | Status: SHIPPED | OUTPATIENT
Start: 2020-04-21 | End: 2020-04-23

## 2020-04-23 ENCOUNTER — ALLIED HEALTH/NURSE VISIT (OUTPATIENT)
Dept: PHARMACY | Facility: CLINIC | Age: 85
End: 2020-04-23
Payer: COMMERCIAL

## 2020-04-23 DIAGNOSIS — F03.91 DEMENTIA WITH BEHAVIORAL DISTURBANCE, UNSPECIFIED DEMENTIA TYPE: Primary | ICD-10-CM

## 2020-04-23 DIAGNOSIS — F41.9 ANXIETY: ICD-10-CM

## 2020-04-23 DIAGNOSIS — G47.00 INSOMNIA, UNSPECIFIED TYPE: ICD-10-CM

## 2020-04-23 DIAGNOSIS — F03.91 DEMENTIA WITH BEHAVIORAL DISTURBANCE, UNSPECIFIED DEMENTIA TYPE: ICD-10-CM

## 2020-04-23 PROCEDURE — 99607 MTMS BY PHARM ADDL 15 MIN: CPT | Performed by: PHARMACIST

## 2020-04-23 PROCEDURE — 99606 MTMS BY PHARM EST 15 MIN: CPT | Performed by: PHARMACIST

## 2020-04-23 RX ORDER — LATANOPROST 50 UG/ML
1 SOLUTION/ DROPS OPHTHALMIC AT BEDTIME
COMMUNITY
Start: 2020-04-23 | End: 2020-04-24

## 2020-04-23 RX ORDER — QUETIAPINE FUMARATE 25 MG/1
25 TABLET, FILM COATED ORAL 3 TIMES DAILY
Qty: 126 TABLET | COMMUNITY
Start: 2020-04-23 | End: 2020-05-13

## 2020-04-24 RX ORDER — TRAVOPROST OPHTHALMIC SOLUTION 0.04 MG/ML
1 SOLUTION OPHTHALMIC AT BEDTIME
COMMUNITY
Start: 2020-03-24 | End: 2020-05-13

## 2020-04-24 NOTE — PROGRESS NOTES
MTM ENCOUNTER  SUBJECTIVE/OBJECTIVE:                           Annabel May is a 85 year old female called for a follow-up visit. She was referred to me from Reese Morfin, her NP.  I spoke with her son, Kelvin, due to pt's cognitive impairment (consent to communicate on file), facility staff, as well as patient's nurse practitioner.  Today's visit is a follow-up MTM visit from 4/9/20.     Patient consented to a telehealth visit: yes      Allergies/ADRs: Reviewed in Epic  Tobacco:  reports that she has never smoked. She has never used smokeless tobacco.  Alcohol: not currently using  Caffeine: no caffeine  Activity: has a 4-wheel walker, however unclear to me if she is using this regularly  PMH: Reviewed in Epic    Medication Adherence/Access: no issues reported  Patient has assistance with medication administration: assisted living.    Dementia w/ behavioral disturbance, Agitation/Anxiety, Insomnia:  Current medications include, Mirtazapine 7.5mg at bedtime, Quetiapine 37.5mg three times daily and 25mg twice daily as needed, Trazodone 100mg at bedtime, Melatonin 5mg at bedtime.  Pt had hospitalization in early January due to increasing agitation possibly exacerbated by UTI and constipation.  Appears per chart review that Quetiapine was initiated 11/2019, and dose increased during hospitalization, and last increased on 2/19/20 from 37.5mg bid to tid.    Mirtazapine was initiated at end of February with goal to help with anxiety, sleep, and allow future reduction of Quetiapine dose.  Son has concerns that the increased Quetiapine dose has worsened confusion, and is concerned for fall risk.  Last fall was in February, just prior to starting Mirtazapine.  Staff again note today that Mirtazapine has helped with sleep, and pt is staying in bed at night.  Quetiapine dose has not yet been reduced, and son would like to attempt this.  Prn Quetiapine has been used 5 times in the last month at varying times for  anxiety/agitation, with last dose on 4/19; nursing staff has noted that it has been somewhat effective/effective when used.  Nursing staff feels appropriate to try a reduction in scheduled dose, keeping prn Quetiapine available.  Son and I discussed that in future, if anxiety continues or worsens, may be beneficial to increase Mirtazapine. He notes he has been able to have some Skype phone visits with her, and has spoken to her on the phone, as well as has been checking in with staff.    Today's Vitals: There were no vitals taken for this visit.      ASSESSMENT:                              Medication Adherence: good, no issues identified    Dementia w/ behavioral disturbance, Agitation/Anxiety, Insomnia:  Improved with addition of Mirtazapine.  Pt may benefit from GDR of Quetiapine from 37.5mg tid to 25mg tid and continue prn dosing.  Quetiapine may increase confusion, rate of cognitive decline, falls.  In future, if necessary, could increase Mirtazapine to 15mg nightly, however pt has been doing well on 7.5mg dose and do not want to make too many changes at once.  May also consider further taper and d'c of Trazodone in future since this didn't seem to provide much benefit.      PLAN:                            1.  Provider may consider reduction in Quetiapine from 37.5mg three times daily and 25mg twice daily as needed to 25mg three times daily and 25mg twice daily as needed.  Monitor target symptoms.     I spent 30 minutes with this patient's son, facility staff, and nurse practitioner today. A copy of the visit note was provided to the patient's primary care provider.    Will follow up in 2-3 weeks, sooner if necessary.    The patient was not given a summary of these recommendations due to cognitive impairment.     Marci Taylor, Pharm.D.,[facility]  Board Certified Geriatric Pharmacist  Medication Therapy Management Pharmacist  [phone]

## 2020-04-28 ENCOUNTER — HOSPITAL LABORATORY (OUTPATIENT)
Dept: OTHER | Facility: CLINIC | Age: 85
End: 2020-04-28

## 2020-04-29 DIAGNOSIS — H40.9 GLAUCOMA OF RIGHT EYE, UNSPECIFIED GLAUCOMA TYPE: Primary | ICD-10-CM

## 2020-04-29 DIAGNOSIS — H40.003 GLAUCOMA SUSPECT, BILATERAL: ICD-10-CM

## 2020-04-29 LAB
SARS-COV-2 RNA SPEC QL NAA+PROBE: NOT DETECTED
SPECIMEN SOURCE: NORMAL

## 2020-04-29 RX ORDER — BRINZOLAMIDE 10 MG/ML
SUSPENSION/ DROPS OPHTHALMIC
Qty: 10 ML | Refills: 97 | Status: SHIPPED | OUTPATIENT
Start: 2020-04-29 | End: 2021-05-23

## 2020-05-01 ENCOUNTER — HOSPITAL LABORATORY (OUTPATIENT)
Dept: OTHER | Facility: CLINIC | Age: 85
End: 2020-05-01

## 2020-05-02 LAB
SARS-COV-2 RNA SPEC QL NAA+PROBE: NOT DETECTED
SPECIMEN SOURCE: NORMAL

## 2020-05-03 ENCOUNTER — TELEPHONE (OUTPATIENT)
Dept: GERIATRICS | Facility: CLINIC | Age: 85
End: 2020-05-03

## 2020-05-04 NOTE — TELEPHONE ENCOUNTER
Nurse called reporting patient is complaining of pain at her pacemaker site. Pacemaker is not new. Per nurse, patient frequently reports pain at the site, which seems to be more related to her dementia and anxiety than pain related.  Pacemaker site is asymptomatic.     Plan: tylenol 1000 mg X 1 dose now. Update PCP with ongoing concerns.     Jesu STEPHEN CNP

## 2020-05-13 ENCOUNTER — ALLIED HEALTH/NURSE VISIT (OUTPATIENT)
Dept: PHARMACY | Facility: CLINIC | Age: 85
End: 2020-05-13
Payer: COMMERCIAL

## 2020-05-13 ENCOUNTER — VIRTUAL VISIT (OUTPATIENT)
Dept: GERIATRICS | Facility: CLINIC | Age: 85
End: 2020-05-13
Payer: MEDICARE

## 2020-05-13 VITALS
WEIGHT: 125 LBS | BODY MASS INDEX: 20.8 KG/M2 | SYSTOLIC BLOOD PRESSURE: 125 MMHG | HEART RATE: 97 BPM | OXYGEN SATURATION: 98 % | DIASTOLIC BLOOD PRESSURE: 74 MMHG | RESPIRATION RATE: 19 BRPM | TEMPERATURE: 98.2 F

## 2020-05-13 DIAGNOSIS — M81.0 AGE-RELATED OSTEOPOROSIS WITHOUT CURRENT PATHOLOGICAL FRACTURE: ICD-10-CM

## 2020-05-13 DIAGNOSIS — F41.9 ANXIETY: ICD-10-CM

## 2020-05-13 DIAGNOSIS — F03.91 DEMENTIA WITH BEHAVIORAL DISTURBANCE, UNSPECIFIED DEMENTIA TYPE: ICD-10-CM

## 2020-05-13 DIAGNOSIS — F03.91 DEMENTIA WITH BEHAVIORAL DISTURBANCE, UNSPECIFIED DEMENTIA TYPE: Primary | ICD-10-CM

## 2020-05-13 DIAGNOSIS — Z95.0 CARDIAC PACEMAKER IN SITU: ICD-10-CM

## 2020-05-13 DIAGNOSIS — E03.9 HYPOTHYROIDISM, UNSPECIFIED TYPE: ICD-10-CM

## 2020-05-13 DIAGNOSIS — K59.01 SLOW TRANSIT CONSTIPATION: ICD-10-CM

## 2020-05-13 DIAGNOSIS — K59.00 CONSTIPATION, UNSPECIFIED CONSTIPATION TYPE: ICD-10-CM

## 2020-05-13 DIAGNOSIS — E53.8 VITAMIN B 12 DEFICIENCY: ICD-10-CM

## 2020-05-13 DIAGNOSIS — R52 PAIN: ICD-10-CM

## 2020-05-13 DIAGNOSIS — G47.00 INSOMNIA, UNSPECIFIED TYPE: ICD-10-CM

## 2020-05-13 DIAGNOSIS — I48.20 CHRONIC ATRIAL FIBRILLATION (H): ICD-10-CM

## 2020-05-13 DIAGNOSIS — R07.89 DISCOMFORT OF CHEST WALL: Primary | ICD-10-CM

## 2020-05-13 PROCEDURE — 99606 MTMS BY PHARM EST 15 MIN: CPT | Performed by: PHARMACIST

## 2020-05-13 PROCEDURE — 99607 MTMS BY PHARM ADDL 15 MIN: CPT | Performed by: PHARMACIST

## 2020-05-13 RX ORDER — QUETIAPINE FUMARATE 25 MG/1
25 TABLET, FILM COATED ORAL 3 TIMES DAILY
COMMUNITY
End: 2020-05-14

## 2020-05-13 RX ORDER — LATANOPROST 50 UG/ML
1 SOLUTION/ DROPS OPHTHALMIC DAILY
COMMUNITY
Start: 2020-04-24 | End: 2020-06-29

## 2020-05-13 RX ORDER — QUETIAPINE FUMARATE 25 MG/1
37.5 TABLET, FILM COATED ORAL 3 TIMES DAILY
Qty: 126 TABLET | Status: SHIPPED | OUTPATIENT
Start: 2020-05-13 | End: 2020-05-13

## 2020-05-13 NOTE — LETTER
"    5/13/2020        RE: Annabel May  [facility]  [address]  [place] [address]        See above      [facility]   Annabel May is being evaluated via a billable video visit due to the restrictions of the Covid-19 pandemic.   The patient has been notified of following:  \"This video visit will be conducted via a call between you and your provider. We have found that certain health care needs can be provided without the need for an in-person physical exam.  This service lets us provide the care you need with a video conversation. If during the course of the call the provider feels a video visit is not appropriate, you will not be charged for this service.\"   The provider has received verbal consent for a Video Visit from the patient or first contact? Yes  Patient or facility staff would like the video invitation sent by: N/A   Video Start Time: 3:11 pm  [facility] Medical Record Number:  [id number]  Place of Location at the time of visit: [facility]    HPI:  Annabel May  is a 86 year old (5/12/1934), who is being seen today for an E-REG visit.  HPI information obtained from: facility staff, patient report, [facility] chart review and family/first contact son report.   Pt has resided at [facility] unit for 4 months (admit 1/2020) and has progressive Alzheimer's dementia with anxious and agitative features.   She is seen today for concerns about her pacemaker.   She has been reporting discomfort there \"like a big lump,\" and is often rubbing the area.    I talked with her son Kelvin by phone, and he reports patient has had this problem in the past   She has had a double mastectomy so very little subcutaneous tissue in the area of the pacemaker.   Improved with padded T-shirts and clothing up over that site.     Also discussed her anxiety, which has been an ongoing problem but worse with quarantine.   See Pharm D note from today.   Pt does " respond to redirection for the most part, but at times can be inconsolable.    Kelvin also reports she also has times when she does not seem to be anxious.   Recently celebrated her birthday and Mother's Day through the window and virtually.      There are COVID cases in the unit, but her swab has been negative on 2 occasions.       By chart review, patient has had several years of worsening STML.   Over past year she has lost language and mobility, with falls reported.   She was hospitalized 1/6-14 for dementia with agitation.   She no longer recognized her  and represented higher care needs than could be provided in AL.   She was given ceftriaxone for UTI with sensitive E coli 50-100k on UC, and bowel regimen increased for large amount of stool seen on abd CT.   She was seen by Psychiatry and quetiapine dose increased. Agitation improved some with these measures and she was discharged directly to Memory Care unit.      Past Medical History:   Diagnosis Date     Arrhythmia     1AVB, wenckebach  Pacemaker placed 2015     Arthritis      Breast cancer (H)  S/p bilateral mastectomy      Hyperlipidaemia      Hypertension      Hypothyroid      Migraines      PONV (postoperative nausea and vomiting)      Syncope    Late onset dementia, Alzheimer's type  Anxiety    Past Surgical History:   Procedure Laterality Date     APPENDECTOMY       BREAST SURGERY      tee. profilactic mastectomy     CHOLECYSTECTOMY       ENT SURGERY      partial thyroidectomy     GLAUCOMA SURGERY Left 10/16/2017     GYN SURGERY      hystereectomy     HEAD & NECK SURGERY       HYSTERECTOMY       PHACOEMULSIFICATION CLEAR CORNEA W/ STANDARD IOL IMPLANT, ENDOSCOPIC CYCLOPHOTOCOAGULATION, COMBINED  6/28/2012    Procedure: COMBINED PHACOEMULSIFICATION CLEAR CORNEA WITH STANDARD INTRAOCULAR LENS IMPLANT, ENDOSCOPIC CYCLOPHOTOCOAGULATION;  RIGHT COMBINED PHACOEMULSIFICATION CLEAR CORNEA WITH STANDARD INTRAOCULAR LENS IMPLANT, ENDOSCOPIC  CYCLOPHOTOCOAGULATION ;  Surgeon: Vinay Duque MD;  Location: [facility]     SH:  Previously lived with her  at the [facility].   She has a son Kelvin who is a radiologist; daughter Manoj  Non smoker    MEDICATIONS:  Current Outpatient Medications   Medication Sig Dispense Refill     acetaminophen (TYLENOL) 500 MG tablet Take 500 mg by mouth every 6 hours as needed for mild pain       alendronate (FOSAMAX) 70 MG tablet TAKE ONE TABLET BY MOUTH EVERY 7 DAYS ON SUNDAY 4 tablet 97     AZOPT 1 % ophthalmic suspension INSTILL ONE DROP IN EACH EYE TWICE DAILY 10 mL 97     bisacodyl (DULCOLAX) 10 MG suppository Place 1 suppository (10 mg) rectally daily as needed for constipation Give if no record bowel movement x 3 days 10 suppository 11     ELIQUIS ANTICOAGULANT 2.5 MG tablet TAKE 1 TABLET BY MOUTH TWICE DAILY 80 tablet 11     levothyroxine (SYNTHROID/LEVOTHROID) 75 MCG tablet TAKE 1 TABLET BY MOUTH ONCE DAILY 28 tablet PRN     MELATONIN MAXIMUM STRENGTH 5 MG tablet TAKE 1 TABLET BY MOUTH AT BEDTIME 37 tablet 97     metoprolol succinate ER (TOPROL-XL) 25 MG 24 hr tablet TAKE 1 TABLET BY MOUTH ONCE DAILY 28 tablet PRN     mirtazapine (REMERON) 7.5 MG tablet TAKE 1 TABLET BY MOUTH AT BEDTIME 28 tablet PRN     Multiple Vitamins-Minerals (PRESERVISION AREDS) CAPS TAKE 1 CAPSULE BY MOUTH TWICE DAILY 60 capsule 11     polyethylene glycol (MIRALAX/GLYCOLAX) packet Take 17 g by mouth every other day Hold for loose stools 17 g 11     QUEtiapine (SEROQUEL) 25 MG tablet Take 1 tablet (25 mg) by mouth 3 times daily 126 tablet PRN     QUEtiapine (SEROQUEL) 25 MG tablet Take 1 tablet (25 mg) by mouth 2 times daily as needed (agitation) 30 tablet 0     senna-docusate 8.6-50 MG PO tablet Take 2 tablets by mouth At Bedtime AND 1 tablet every morning. And may have 1 tablet daily  prn for constipation 60 tablet 0     travoprost BAK FREE (TRAVATAN Z) 0.004 % ophthalmic solution Place 1 drop into both eyes At Bedtime        traZODone (DESYREL) 100 MG tablet TAKE 1 TABLET BY MOUTH AT BEDTIME 28 tablet PRN     vitamin B-12 (CYANOCOBALAMIN) 100 MCG tablet TAKE 1 TABLET BY MOUTH ONCE DAILY 37 tablet 97     REVIEW OF SYSTEMS: Unobtainable secondary to cognitive impairment.   Ambulatory with 4WW, +falls  Wt Readings from Last 5 Encounters:   05/13/20 56.7 kg (125 lb)   02/28/20 65.8 kg (145 lb)   02/18/20 59 kg (130 lb)   12/04/19 59 kg (130 lb)   06/20/19 60.8 kg (134 lb)      Objective: /74   Pulse 97   Temp 98.2  F (36.8  C)   Resp 19   Wt 56.7 kg (125 lb)   SpO2 98%   BMI 20.80 kg/m    Limited visit exam done given COVID-19 precautions.   GENERAL APPEARANCE:  Alert, anxious  PSYCH:  repetitive, disoriented, tearful  Chest wall very thin with prominent pacemaker visible but 10-12 cm below left clavicle.     No erythema or open area appreciated and nurse reports no warmth.       COVID 19 negative on 4/28 and 5/3       Recent Labs   Lab Test 02/28/20  0941 01/06/20  1530 12/04/19  1421   WBC 8.6 7.9 8.1   HGB 13.3 14.7 14.0   MCV 97 94 96    172 163     Most Recent 3 BMP's:  Recent Labs   Lab Test 02/28/20  0941 01/12/20  0831 01/09/20  0903 01/06/20  1530 12/04/19  1421     --   --  141 140   POTASSIUM 4.8  --   --  3.8 4.5   CHLORIDE 109  --   --  111* 111*   CO2 23  --   --  23 23   BUN 22  --   --  22 19   CR 0.95 1.09* 0.97 1.02 1.06*   ANIONGAP 4  --   --  7 6   AMERICO 9.3  --   --  9.9 8.8   GLC 78  --   --  93 95     CT SCAN OF THE HEAD WITHOUT CONTRAST   2/28/2020 10:11 AM   HISTORY: Acute pain after blunt trauma  COMPARISON: Head CT 1/6/2020.  FINDINGS: No evidence of acute intracranial hemorrhage. No mass effect  or midline shift. Chronic area of encephalomalacia in the right  frontal lobe. Moderate diffuse parenchymal volume loss. Patchy  periventricular white matter hypodensities which are nonspecific, but  likely related to chronic microvascular ischemic disease. No abnormal  extra axial fluid collection.  Ventricular size is unchanged without  evidence of hydrocephalus. Cavum septum lucidum and cavum verge again noted.  Surgical device along the superolateral aspect of the left globe again  present. Paranasal sinuses and mastoid air cells appear clear.                                                   IMPRESSION:     1. No evidence of acute intracranial hemorrhage, mass, or herniation.  2. Chronic changes as above.       ASSESSMENT/PLAN:  (R07.89) Discomfort of chest wall  (primary encounter diagnosis)  (Z95.0) Cardiac pacemaker in situ  Comment: intermittent and exacerbated by lack of subcutaneous tissue  Plan: Padded shirts.    Would help if pacemaker site is well-covered so that she does not rub it.    Schedule acetaminophen bid for any associated discomfort    (F41.9) Anxiety  Comment: longstanding, worsened by cognitive decline, disorientation and loss of coping skills.      Plan: increase mirtazapine to 15 mg/ at HS.       (F03.91) Dementia with behavioral disturbance, unspecified dementia type (H)  Comment: progressive cognitive decline with loss of language, coping skills, functional status.     Plan: [facility] unit for assist with meds, meals, activity, secure unit.     She has been on scheduled tid and prn quetiapine for agitation since seen by Psychiatry in the hospital.       (I48.20) Chronic atrial fibrillation  Comment:   Pulse Readings from Last 4 Encounters:   05/13/20 97   02/28/20 89   02/19/20 82   01/18/20 81      Plan: metoprolol 25 mg/day for VR control, and apixaban for stroke prophylaxis       (M81.0) Age-related osteoporosis without current pathological fracture  Comment: she has been on alendronate 8-9 years    Plan: would discontinue alendronate now.       (E03.9) Hypothyroidism, unspecified type  Comment: TSH 2.21 on 10/2/2019     Plan: same dose of levothyroxine, yearly TSH       (E53.8) Vitamin B 12 deficiency  Comment: on oral replacement   Plan: same     (K59.01) Slow transit  constipation  Comment: as above    Plan: Senna, Miralax, prn supp     Electronically signed by:  Jennifer Agarwal MD     Video-Visit Details  Type of service:  Video Visit  Video End Time (time video stopped): 3:21 pm  Distant Location (provider location):  [facility]             Sincerely,        Jennifer Agarwal MD

## 2020-05-13 NOTE — PROGRESS NOTES
MTM ENCOUNTER  SUBJECTIVE/OBJECTIVE:                           Annabel May is a 86 year old female called for a follow-up visit. She was referred to me from Reese Morfin NP.  Today's visit is a follow-up MTM visit from 4/23/20.  I spoke with her son, Kelvin, due to cognitive impairment, as well as nurse at facility, Kathy.    Patient consented to a telehealth visit: yes  Telemedicine Visit Details  Type of service:  Telephone visit  Start Time: 3:00pm  End Time: 3:20pm  Originating Location (pt. Location): Home  Distant Location (provider location):  [facility]  Mode of Communication:  Telephone    Chief Complaint: follow-up from 4/23 MTM visit regarding Quetiapine reduction at last visit.      Allergies/ADRs: Reviewed in Epic  Tobacco:  reports that she has never smoked. She has never used smokeless tobacco.  Alcohol: not currently using  Caffeine: no caffeine  Activity: has 4-wheel walker, unclear to me if she uses this regularly  PMH: Reviewed in Epic    Medication Adherence/Access: no issues reported.  Has assistance with medications: lives in memory care.    Dementia w/ behavioral disturbance, Agitation/Anxiety, Insomnia: Current medications include Mirtazapine 7.5mg at bedtime, Quetiapine 25mg three times daily and 25mg twice daily as needed (reduced from 37.5mg tid at last visit), Trazodone 100mg at bedtime, Melatonin 5mg at bedtime.  Pt had hospitalization in early January due to increasing agitation possibly exacerbated by UTI and constipation.  Appears per chart review that Quetiapine was initiated 11/2019, and dose increased during hospitalization, and last increased on 2/19/20 from 37.5mg bid to tid.    Mirtazapine was initiated at end of February with goal to help with anxiety, sleep, and allow future reduction of Quetiapine dose.  Son has concerns that the increased Quetiapine dose has worsened confusion, and is concerned for fall risk.  Last fall was in February, just prior  to starting Mirtazapine.  Staff again note today that Mirtazapine has helped with sleep, and pt is staying in bed at night.  As noted above, Quetiapine reduced at our last visit - dose reduced beginning 4/25.  Prn Quetiapine has been used 4 times since then for anxiety/agitation, typically around lunch time, with last dose on 5/9; nursing staff has noted that it has been somewhat effective/effective when used, except for one time noting it was not effective.  Son does note that staff has been calling him more re anxiety/agitation, although this could be due to staff being more attentive.  He notes that he has seen pt more frequently face-to-face over virtual visits in last week due to Mother's Day, her birthday.  States she will be anxious, but when family is able to explain why they can't come due to COVID restrictions, she seems to understand and calms down quickly.  Kathy notes that pt is social typically, so restrictions with visitors has been tough on patient.     Pain: Current medication includes prn Tylenol.  Noted that his has been given 6 times since 4/29 with efficacy.  Pt has complained of pain around her pacemaker site, and son reports that she has a thin chest wall/little muscle, and the pacemaker does bulge and is noticeable to patient.  Kathy notes she can fixate on this as well.  Son has suggested to staff to ensure pt wears sweaters or more layers to provide more padding with hopes of the pacemaker not being as noticeable to her.  He also notes h/o part of pectoralis muscle being excised and that he believes she could be experiencing some pain.    Constipation: Current medication includes Miralax 17gm every other day, Senna-S 1 tab qam and 2 tabs at bedtime & 1 daily prn, Bisacodyl sup 10mg daily prn.  Noted pt has received prn Senna-  S twice since last seen, and prn Bisacodyl 3 times.  Kathy states that pt does toilet herself, so it is tough to know if/when she has had a BM. As noted above,  did have a previous hospitalization due to agitation possibly exacerbated by UTI as well as constipation.      Osteoporosis: Current therapy includes: alendronate (Fosamax) 70mg weekly (Pt has been on current therapy for at least 8-9 years). Due to cognitive impairment, difficult to know if pt is experiencing side effects.   Last vitamin D level: 10/2/19 = 46.1  DEXA History: 12/13/19:  T score -2.6 at femoral neck. The patient's 10 year probability of suffering a major osteoporotic fracture is 18 % and the 10 year probability of suffering a hip fracture is 6.8 %   Risk factors: post-menopausal, frail, h/o fall    Est CrCl (Cockroft-Gault) = 38ml/min (based on actual BW 56.7kg & Scr 0.95)    ASSESSMENT:                              Medication Adherence: good, no issues identified    Dementia w/ behavioral disturbance, Agitation/Anxiety, Insomnia: May benefit from increase in Mirtazapine to 15mg nightly.  In future, may consider beginning taper/d'c of Trazodone since this was started for sleep previously, and has not appeared to be very helpful per son and per chart review.  Began sleeping better with initiation of Mirtazapine.     Pain: May benefit from scheduling Tylenol 1000mg twice daily since pt likely having pain, and pain may contribute to anxiety/agitation.  Studies have shown that scheduled tylenol in pts with dementia can help to reduce use of antipsychotics.  Son is agreeable as he believes pt is likely having some pain.    Constipation: Continue to monitor as best facility can; may benefit from increase in morning Senna-S dose in near future if continues to require prn Bisacodyl and/or Senna-S.  Constipation may be contributing to anxiety/agitation.    Osteoporosis:  May be of benefit to consider d'c Alendronate.  Pt is high fracture risk, however,  has been on for at least 8-9 years per son's estimation, Alendronate with very long elimination half-life, and pt's kidney function is borderline for  continued use.  Possible that Alendronate could be contributing to gi adverse effects that pt has difficulty vocalizing due to dementia, and likely unable to follow instructions (need to stay upright for 30min or more AND until after first food of day to avoid esophageal irritation).  Did not discuss this recommendation with son today.      PLAN:                            1.  Provider may consider increase in Mirtazapine to 15mg nightly.  Monitor mood, sleep, anxiety/agitation.    2.  Provider may consider scheduling Tylenol 1000mg twice daily.      3.  Continue to monitor constipation.  If continuing to require frequent prn use of Senna-S and/or Bisacodyl, may benefit from increase in morning Senna-S dose.    4.  Provider may consider stopping Alendronate.      I spent 20 minutes with this patient today. A copy of the visit note was provided to the patient's referring provider.    Will follow up in one month, sooner if necessary.    The patient was not given a summary of these recommendations due to cognitive impairment.     Marci Taylor, Pharm.D.,[facility]  Board Certified Geriatric Pharmacist  Medication Therapy Management Pharmacist  [phone]

## 2020-05-13 NOTE — Clinical Note
Arsenio Leigh - please see note.  I didn't talk to Kelvin about the fosamax, so let me know if you would like me to call him to discuss if you agree!  Thanks!

## 2020-05-14 DIAGNOSIS — F03.91 DEMENTIA WITH BEHAVIORAL DISTURBANCE, UNSPECIFIED DEMENTIA TYPE: ICD-10-CM

## 2020-05-14 DIAGNOSIS — F33.41 RECURRENT MAJOR DEPRESSION IN PARTIAL REMISSION (H): ICD-10-CM

## 2020-05-14 DIAGNOSIS — F01.518 VASCULAR DEMENTIA WITH BEHAVIOR DISTURBANCE (H): Primary | ICD-10-CM

## 2020-05-14 DIAGNOSIS — R52 GENERALIZED PAIN: ICD-10-CM

## 2020-05-14 RX ORDER — QUETIAPINE FUMARATE 25 MG/1
TABLET, FILM COATED ORAL
Qty: 130.5 TABLET | Refills: 97 | Status: SHIPPED | OUTPATIENT
Start: 2020-05-14 | End: 2020-05-15

## 2020-05-15 RX ORDER — MIRTAZAPINE 7.5 MG/1
15 TABLET, FILM COATED ORAL AT BEDTIME
Qty: 28 TABLET | Status: SHIPPED | OUTPATIENT
Start: 2020-05-15 | End: 2020-06-25

## 2020-05-15 RX ORDER — QUETIAPINE FUMARATE 25 MG/1
25 TABLET, FILM COATED ORAL 3 TIMES DAILY
Qty: 130.5 TABLET | Refills: 97 | Status: SHIPPED | OUTPATIENT
Start: 2020-05-15 | End: 2020-07-13

## 2020-05-15 RX ORDER — AMOXICILLIN 250 MG
2 CAPSULE ORAL 2 TIMES DAILY
Qty: 60 TABLET | Refills: 0 | Status: SHIPPED | OUTPATIENT
Start: 2020-05-15 | End: 2020-05-21

## 2020-05-15 RX ORDER — ACETAMINOPHEN 500 MG
TABLET ORAL
Qty: 60 TABLET | Refills: 4 | Status: SHIPPED | OUTPATIENT
Start: 2020-05-15 | End: 2020-06-12

## 2020-05-19 PROBLEM — Z95.0 CARDIAC PACEMAKER IN SITU: Status: ACTIVE | Noted: 2020-05-19

## 2020-05-21 DIAGNOSIS — F03.91 DEMENTIA WITH BEHAVIORAL DISTURBANCE, UNSPECIFIED DEMENTIA TYPE: ICD-10-CM

## 2020-05-21 RX ORDER — DOCUSATE SODIUM 50MG AND SENNOSIDES 8.6MG 8.6; 5 MG/1; MG/1
TABLET, FILM COATED ORAL
Qty: 124 TABLET | Status: SHIPPED | OUTPATIENT
Start: 2020-05-21 | End: 2021-06-12

## 2020-05-21 NOTE — PROGRESS NOTES
"[facility]   Annabel May is being evaluated via a billable video visit due to the restrictions of the Covid-19 pandemic.   The patient has been notified of following:  \"This video visit will be conducted via a call between you and your provider. We have found that certain health care needs can be provided without the need for an in-person physical exam.  This service lets us provide the care you need with a video conversation. If during the course of the call the provider feels a video visit is not appropriate, you will not be charged for this service.\"   The provider has received verbal consent for a Video Visit from the patient or first contact? Yes  Patient or facility staff would like the video invitation sent by: N/A   Video Start Time: 3:11 pm  [place] Medical Record Number:  [id number]  Place of Location at the time of visit: [facility]    HPI:  Annabel May  is a 86 year old (5/12/1934), who is being seen today for an E-REG visit.  HPI information obtained from: facility staff, patient report, [facility] chart review and family/first contact son report.   Pt has resided at [facility] unit for 4 months (admit 1/2020) and has progressive Alzheimer's dementia with anxious and agitative features.   She is seen today for concerns about her pacemaker.   She has been reporting discomfort there \"like a big lump,\" and is often rubbing the area.    I talked with her son Kelvin by phone, and he reports patient has had this problem in the past   She has had a double mastectomy so very little subcutaneous tissue in the area of the pacemaker.   Improved with padded T-shirts and clothing up over that site.     Also discussed her anxiety, which has been an ongoing problem but worse with quarantine.   See Pharm D note from today.   Pt does respond to redirection for the most part, but at times can be inconsolable.    Kelvin also reports she also has times " when she does not seem to be anxious.   Recently celebrated her birthday and Mother's Day through the window and virtually.      There are COVID cases in the unit, but her swab has been negative on 2 occasions.       By chart review, patient has had several years of worsening STML.   Over past year she has lost language and mobility, with falls reported.   She was hospitalized 1/6-14 for dementia with agitation.   She no longer recognized her  and represented higher care needs than could be provided in AL.   She was given ceftriaxone for UTI with sensitive E coli 50-100k on UC, and bowel regimen increased for large amount of stool seen on abd CT.   She was seen by Psychiatry and quetiapine dose increased. Agitation improved some with these measures and she was discharged directly to Memory Care unit.      Past Medical History:   Diagnosis Date     Arrhythmia     1AVB, wenckebach  Pacemaker placed 2015     Arthritis      Breast cancer (H)  S/p bilateral mastectomy      Hyperlipidaemia      Hypertension      Hypothyroid      Migraines      PONV (postoperative nausea and vomiting)      Syncope    Late onset dementia, Alzheimer's type  Anxiety    Past Surgical History:   Procedure Laterality Date     APPENDECTOMY       BREAST SURGERY      tee. profilactic mastectomy     CHOLECYSTECTOMY       ENT SURGERY      partial thyroidectomy     GLAUCOMA SURGERY Left 10/16/2017     GYN SURGERY      hystereectomy     HEAD & NECK SURGERY       HYSTERECTOMY       PHACOEMULSIFICATION CLEAR CORNEA W/ STANDARD IOL IMPLANT, ENDOSCOPIC CYCLOPHOTOCOAGULATION, COMBINED  6/28/2012    Procedure: COMBINED PHACOEMULSIFICATION CLEAR CORNEA WITH STANDARD INTRAOCULAR LENS IMPLANT, ENDOSCOPIC CYCLOPHOTOCOAGULATION;  RIGHT COMBINED PHACOEMULSIFICATION CLEAR CORNEA WITH STANDARD INTRAOCULAR LENS IMPLANT, ENDOSCOPIC CYCLOPHOTOCOAGULATION ;  Surgeon: Vinay Duque MD;  Location: [facility]:  Previously lived with her  at the  [facility]   She has a son Kelvin who is a radiologist; daughter Manoj  Non smoker    MEDICATIONS:  Current Outpatient Medications   Medication Sig Dispense Refill     acetaminophen (TYLENOL) 500 MG tablet Take 500 mg by mouth every 6 hours as needed for mild pain       alendronate (FOSAMAX) 70 MG tablet TAKE ONE TABLET BY MOUTH EVERY 7 DAYS ON SUNDAY 4 tablet 97     AZOPT 1 % ophthalmic suspension INSTILL ONE DROP IN EACH EYE TWICE DAILY 10 mL 97     bisacodyl (DULCOLAX) 10 MG suppository Place 1 suppository (10 mg) rectally daily as needed for constipation Give if no record bowel movement x 3 days 10 suppository 11     ELIQUIS ANTICOAGULANT 2.5 MG tablet TAKE 1 TABLET BY MOUTH TWICE DAILY 80 tablet 11     levothyroxine (SYNTHROID/LEVOTHROID) 75 MCG tablet TAKE 1 TABLET BY MOUTH ONCE DAILY 28 tablet PRN     MELATONIN MAXIMUM STRENGTH 5 MG tablet TAKE 1 TABLET BY MOUTH AT BEDTIME 37 tablet 97     metoprolol succinate ER (TOPROL-XL) 25 MG 24 hr tablet TAKE 1 TABLET BY MOUTH ONCE DAILY 28 tablet PRN     mirtazapine (REMERON) 7.5 MG tablet TAKE 1 TABLET BY MOUTH AT BEDTIME 28 tablet PRN     Multiple Vitamins-Minerals (PRESERVISION AREDS) CAPS TAKE 1 CAPSULE BY MOUTH TWICE DAILY 60 capsule 11     polyethylene glycol (MIRALAX/GLYCOLAX) packet Take 17 g by mouth every other day Hold for loose stools 17 g 11     QUEtiapine (SEROQUEL) 25 MG tablet Take 1 tablet (25 mg) by mouth 3 times daily 126 tablet PRN     QUEtiapine (SEROQUEL) 25 MG tablet Take 1 tablet (25 mg) by mouth 2 times daily as needed (agitation) 30 tablet 0     senna-docusate 8.6-50 MG PO tablet Take 2 tablets by mouth At Bedtime AND 1 tablet every morning. And may have 1 tablet daily  prn for constipation 60 tablet 0     travoprost BAK FREE (TRAVATAN Z) 0.004 % ophthalmic solution Place 1 drop into both eyes At Bedtime       traZODone (DESYREL) 100 MG tablet TAKE 1 TABLET BY MOUTH AT BEDTIME 28 tablet PRN     vitamin B-12 (CYANOCOBALAMIN) 100 MCG  tablet TAKE 1 TABLET BY MOUTH ONCE DAILY 37 tablet 97     REVIEW OF SYSTEMS: Unobtainable secondary to cognitive impairment.   Ambulatory with 4WW, +falls  Wt Readings from Last 5 Encounters:   05/13/20 56.7 kg (125 lb)   02/28/20 65.8 kg (145 lb)   02/18/20 59 kg (130 lb)   12/04/19 59 kg (130 lb)   06/20/19 60.8 kg (134 lb)      Objective: /74   Pulse 97   Temp 98.2  F (36.8  C)   Resp 19   Wt 56.7 kg (125 lb)   SpO2 98%   BMI 20.80 kg/m    Limited visit exam done given COVID-19 precautions.   GENERAL APPEARANCE:  Alert, anxious  PSYCH:  repetitive, disoriented, tearful  Chest wall very thin with prominent pacemaker visible but 10-12 cm below left clavicle.     No erythema or open area appreciated and nurse reports no warmth.       COVID 19 negative on 4/28 and 5/3       Recent Labs   Lab Test 02/28/20  0941 01/06/20  1530 12/04/19  1421   WBC 8.6 7.9 8.1   HGB 13.3 14.7 14.0   MCV 97 94 96    172 163     Most Recent 3 BMP's:  Recent Labs   Lab Test 02/28/20  0941 01/12/20  0831 01/09/20  0903 01/06/20  1530 12/04/19  1421     --   --  141 140   POTASSIUM 4.8  --   --  3.8 4.5   CHLORIDE 109  --   --  111* 111*   CO2 23  --   --  23 23   BUN 22  --   --  22 19   CR 0.95 1.09* 0.97 1.02 1.06*   ANIONGAP 4  --   --  7 6   AMERICO 9.3  --   --  9.9 8.8   GLC 78  --   --  93 95     CT SCAN OF THE HEAD WITHOUT CONTRAST   2/28/2020 10:11 AM   HISTORY: Acute pain after blunt trauma  COMPARISON: Head CT 1/6/2020.  FINDINGS: No evidence of acute intracranial hemorrhage. No mass effect  or midline shift. Chronic area of encephalomalacia in the right  frontal lobe. Moderate diffuse parenchymal volume loss. Patchy  periventricular white matter hypodensities which are nonspecific, but  likely related to chronic microvascular ischemic disease. No abnormal  extra axial fluid collection. Ventricular size is unchanged without  evidence of hydrocephalus. Cavum septum lucidum and cavum verge again  noted.  Surgical device along the superolateral aspect of the left globe again  present. Paranasal sinuses and mastoid air cells appear clear.                                                   IMPRESSION:     1. No evidence of acute intracranial hemorrhage, mass, or herniation.  2. Chronic changes as above.       ASSESSMENT/PLAN:  (R07.89) Discomfort of chest wall  (primary encounter diagnosis)  (Z95.0) Cardiac pacemaker in situ  Comment: intermittent and exacerbated by lack of subcutaneous tissue  Plan: Padded shirts.    Would help if pacemaker site is well-covered so that she does not rub it.    Schedule acetaminophen bid for any associated discomfort    (F41.9) Anxiety  Comment: longstanding, worsened by cognitive decline, disorientation and loss of coping skills.      Plan: increase mirtazapine to 15 mg/ at HS.       (F03.91) Dementia with behavioral disturbance, unspecified dementia type (H)  Comment: progressive cognitive decline with loss of language, coping skills, functional status.     Plan: [place] [facility] Care unit for assist with meds, meals, activity, secure unit.     She has been on scheduled tid and prn quetiapine for agitation since seen by Psychiatry in the hospital.       (I48.20) Chronic atrial fibrillation  Comment:   Pulse Readings from Last 4 Encounters:   05/13/20 97   02/28/20 89   02/19/20 82   01/18/20 81      Plan: metoprolol 25 mg/day for VR control, and apixaban for stroke prophylaxis       (M81.0) Age-related osteoporosis without current pathological fracture  Comment: she has been on alendronate 8-9 years    Plan: would discontinue alendronate now.       (E03.9) Hypothyroidism, unspecified type  Comment: TSH 2.21 on 10/2/2019     Plan: same dose of levothyroxine, yearly TSH       (E53.8) Vitamin B 12 deficiency  Comment: on oral replacement   Plan: same     (K59.01) Slow transit constipation  Comment: as above    Plan: Senna, Miralax, prn supp     Electronically signed by:  Jennifer Agarwal MD      Video-Visit Details  Type of service:  Video Visit  Video End Time (time video stopped): 3:21 pm  Distant Location (provider location):  [facility]

## 2020-05-27 ENCOUNTER — HOSPITAL LABORATORY (OUTPATIENT)
Dept: OTHER | Facility: CLINIC | Age: 85
End: 2020-05-27

## 2020-05-28 LAB
SARS-COV-2 RNA SPEC QL NAA+PROBE: NOT DETECTED
SPECIMEN SOURCE: NORMAL

## 2020-06-05 ENCOUNTER — HOSPITAL LABORATORY (OUTPATIENT)
Dept: OTHER | Facility: CLINIC | Age: 85
End: 2020-06-05

## 2020-06-10 ENCOUNTER — ASSISTED LIVING VISIT (OUTPATIENT)
Dept: GERIATRICS | Facility: CLINIC | Age: 85
End: 2020-06-10
Payer: MEDICARE

## 2020-06-10 VITALS
RESPIRATION RATE: 16 BRPM | SYSTOLIC BLOOD PRESSURE: 140 MMHG | OXYGEN SATURATION: 99 % | BODY MASS INDEX: 19.58 KG/M2 | DIASTOLIC BLOOD PRESSURE: 90 MMHG | HEART RATE: 90 BPM | TEMPERATURE: 97.5 F | HEIGHT: 67 IN

## 2020-06-10 DIAGNOSIS — F41.9 ANXIETY: ICD-10-CM

## 2020-06-10 DIAGNOSIS — R45.1 AGITATION: ICD-10-CM

## 2020-06-10 DIAGNOSIS — M81.0 AGE-RELATED OSTEOPOROSIS WITHOUT CURRENT PATHOLOGICAL FRACTURE: ICD-10-CM

## 2020-06-10 DIAGNOSIS — F03.91 DEMENTIA WITH BEHAVIORAL DISTURBANCE, UNSPECIFIED DEMENTIA TYPE: ICD-10-CM

## 2020-06-10 DIAGNOSIS — K59.01 SLOW TRANSIT CONSTIPATION: Primary | ICD-10-CM

## 2020-06-10 RX ORDER — POLYETHYLENE GLYCOL 3350 17 G/17G
1 POWDER, FOR SOLUTION ORAL DAILY
Qty: 17 G | Refills: 11 | COMMUNITY
Start: 2020-06-10 | End: 2020-08-12

## 2020-06-10 NOTE — PROGRESS NOTES
[facility]  [place] Medical Record Number:  [id number]  Place of Service where encounter took place:  [facility] [[id number]]  Chief Complaint   Patient presents with     RECHECK       HPI:    Annabel May  is a 86 year old (5/12/1934), who is being seen today for an episodic care visit.  HPI information obtained from: facility chart records, facility staff and [facility] chart review. Today's concern is:    Continues with episodes of constipation despite increase to Senna S. Toilets herself and is unable to discuss if issue is resolving. Agitated and anxious at baseline with advanced dementia but is redirectable today. COIVD-19 positive in memory care but her recent test is negative.     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 Over past year she has lost language and mobility, with falls reported. She was hospitalized 1/6-14 for dementia with agitation.   She no longer recognized her  and represented higher care needs than could be provided in AL.   She was given ceftriaxone for UTI with sensitive E coli 50-100k on UC, and bowel regimen increased for large amount of stool seen on abd CT. She was seen by Psychiatry and quetiapine dose increased. Agitation improved some with these measures and she was discharged directly to Memory Care unit.      Past Medical and Surgical History reviewed in Epic today.    MEDICATIONS:    Current Outpatient Medications   Medication Sig Dispense Refill     acetaminophen (TYLENOL) 500 MG tablet Take 2 tablets (1,000 mg) by mouth 2 times daily. May also take 1 tablet (500 mg) 2 times daily as needed for mild pain. 60 tablet 4     alendronate (FOSAMAX) 70 MG tablet TAKE ONE TABLET BY MOUTH EVERY 7 DAYS ON SUNDAY 4 tablet 97     AZOPT 1 % ophthalmic suspension INSTILL ONE DROP IN EACH EYE TWICE DAILY 10 mL 97     bisacodyl (DULCOLAX) 10 MG suppository Place 1 suppository (10 mg) rectally daily as  "needed for constipation Give if no record bowel movement x 3 days 10 suppository 11     ELIQUIS ANTICOAGULANT 2.5 MG tablet TAKE 1 TABLET BY MOUTH TWICE DAILY 80 tablet 11     latanoprost (XALATAN) 0.005 % ophthalmic solution Place 1 drop into both eyes daily at bedtime       levothyroxine (SYNTHROID/LEVOTHROID) 75 MCG tablet TAKE 1 TABLET BY MOUTH ONCE DAILY 28 tablet PRN     MELATONIN MAXIMUM STRENGTH 5 MG tablet TAKE 1 TABLET BY MOUTH AT BEDTIME 37 tablet 97     metoprolol succinate ER (TOPROL-XL) 25 MG 24 hr tablet TAKE 1 TABLET BY MOUTH ONCE DAILY 28 tablet PRN     mirtazapine (REMERON) 7.5 MG tablet Take 2 tablets (15 mg) by mouth At Bedtime 28 tablet PRN     Multiple Vitamins-Minerals (PRESERVISION AREDS) CAPS TAKE 1 CAPSULE BY MOUTH TWICE DAILY 60 capsule 11     polyethylene glycol (MIRALAX) 17 g packet Take 17 g by mouth daily Hold for loose stools 17 g 11     QUEtiapine (SEROQUEL) 25 MG tablet Take 1 tablet (25 mg) by mouth 3 times daily 130.5 tablet 97     QUEtiapine (SEROQUEL) 25 MG tablet Take 1 tablet (25 mg) by mouth 2 times daily as needed (agitation) 30 tablet 0     SENEXON-S 8.6-50 MG tablet TAKE TWO TABLETS BY MOUTH TWICE DAILY HOLD FOR LOOSE STOOL 124 tablet PRN     traZODone (DESYREL) 100 MG tablet TAKE 1 TABLET BY MOUTH AT BEDTIME 28 tablet PRN     vitamin B-12 (CYANOCOBALAMIN) 100 MCG tablet TAKE 1 TABLET BY MOUTH ONCE DAILY 37 tablet 97     REVIEW OF SYSTEMS:  Limited secondary to cognitive impairment but today pt reports ok    Objective:  BP (!) 140/90   Pulse 90   Temp 97.5  F (36.4  C)   Resp 16   Ht 1.702 m (5' 7\")   SpO2 99%   BMI 19.58 kg/m    Exam:  GENERAL APPEARANCE:  Alert, in no distress but anxious at times   ENT:  Mouth and posterior oropharynx normal, dry mucous membranes, normal hearing acuity  EYES:  EOM, conjunctivae, lids, pupils and irises normal  RESP:  respiratory effort and palpation of chest normal, lungs clear to auscultation   CV:  Palpation and auscultation of " heart done , regular rate and rhythm, no murmur, rub, or gallop  ABDOMEN: hypoactive bowel sounds, some distention and firm  M/S:   Gait and station at baseline w/o assistive device   SKIN:  Inspection of skin and subcutaneous tissue baseline to visualized areas. Cyst/nodule hard on index finger right hand  NEURO:   Cranial nerves 2-12 are normal tested and grossly at patient's baseline  PSYCH:  insight and judgement impaired, memory impaired    Labs:   CBC RESULTS:   Recent Labs   Lab Test 02/28/20  0941 01/06/20  1530   WBC 8.6 7.9   RBC 4.15 4.70   HGB 13.3 14.7   HCT 40.1 44.3   MCV 97 94   MCH 32.0 31.3   MCHC 33.2 33.2   RDW 14.2 13.8    172       Last Basic Metabolic Panel:  Recent Labs   Lab Test 02/28/20  0941 01/12/20  0831  01/06/20  1530     --   --  141   POTASSIUM 4.8  --   --  3.8   CHLORIDE 109  --   --  111*   AMERICO 9.3  --   --  9.9   CO2 23  --   --  23   BUN 22  --   --  22   CR 0.95 1.09*   < > 1.02   GLC 78  --   --  93    < > = values in this interval not displayed.       Liver Function Studies -   Recent Labs   Lab Test 01/06/20  1530 07/10/14 08/03/12  0847   PROTTOTAL 7.7 6.9 6.6*   ALBUMIN 4.4  --  4.0   BILITOTAL 0.9 0.6 0.7   ALKPHOS 66 77 66   AST 31 19 21   ALT 29 10 19       TSH   Date Value Ref Range Status   07/10/2014 0.98 0.35 - 4.94 mcU/mL Final   08/03/2012 6.61 (H) 0.4 - 5.0 mU/L Final     TSH on 10/2/2019:                   2.21  Vitamin D total 10/2/2019:       46.1  Vitamin B12 10/2/2019:          368         No results found for: A1C    ASSESSMENT/PLAN:  (K59.01) Slow transit constipation  (primary encounter diagnosis)  Comment: ongoing   Plan:   -Senna S two tablets BID  -Miralax was every other day but with ongoing issue trial daily  -supp prn daily     (F03.91) Dementia with behavioral disturbance, unspecified dementia type (H)  (R45.1) Agitation  (F41.9) Anxiety  Comment: flares with behaviors at times   Plan:   -melatonin and trazodone at HS for insomnia in  addition to mirtazapine   -Seroquel 25 mg po TID and twice daily prn (as needed not used)    (M81.0) Age-related osteoporosis without current pathological fracture  Comment: chronic  Plan:   -review discontinuing alendronate with her son (left vm today)        Electronically signed by:  ZAFAR Bae CNP

## 2020-06-10 NOTE — LETTER
6/10/2020        RE: Annabel [address]  [address] [place] [address]        [facility]  [place] [facility] Record Number:  [id number]  Place of Service where encounter took place:  [facility] [[id number]]  Chief Complaint   Patient presents with     RECHECK       HPI:    Annabel May  is a 86 year old (5/12/1934), who is being seen today for an episodic care visit.  HPI information obtained from: facility chart records, facility staff and [facility] chart review. Today's concern is:    Continues with episodes of constipation despite increase to Senna S. Toilets herself and is unable to discuss if issue is resolving. Agitated and anxious at baseline with advanced dementia but is redirectable today. COIVD-19 positive in memory care but her recent test is negative.     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 Over past year she has lost language and mobility, with falls reported. She was hospitalized 1/6-14 for dementia with agitation.   She no longer recognized her  and represented higher care needs than could be provided in AL.   She was given ceftriaxone for UTI with sensitive E coli 50-100k on UC, and bowel regimen increased for large amount of stool seen on abd CT. She was seen by Psychiatry and quetiapine dose increased. Agitation improved some with these measures and she was discharged directly to Memory Care unit.      Past Medical and Surgical History reviewed in Epic today.    MEDICATIONS:    Current Outpatient Medications   Medication Sig Dispense Refill     acetaminophen (TYLENOL) 500 MG tablet Take 2 tablets (1,000 mg) by mouth 2 times daily. May also take 1 tablet (500 mg) 2 times daily as needed for mild pain. 60 tablet 4     alendronate (FOSAMAX) 70 MG tablet TAKE ONE TABLET BY MOUTH EVERY 7 DAYS ON SUNDAY 4 tablet 97     AZOPT 1 % ophthalmic suspension INSTILL ONE DROP IN EACH EYE TWICE DAILY  "10 mL 97     bisacodyl (DULCOLAX) 10 MG suppository Place 1 suppository (10 mg) rectally daily as needed for constipation Give if no record bowel movement x 3 days 10 suppository 11     ELIQUIS ANTICOAGULANT 2.5 MG tablet TAKE 1 TABLET BY MOUTH TWICE DAILY 80 tablet 11     latanoprost (XALATAN) 0.005 % ophthalmic solution Place 1 drop into both eyes daily at bedtime       levothyroxine (SYNTHROID/LEVOTHROID) 75 MCG tablet TAKE 1 TABLET BY MOUTH ONCE DAILY 28 tablet PRN     MELATONIN MAXIMUM STRENGTH 5 MG tablet TAKE 1 TABLET BY MOUTH AT BEDTIME 37 tablet 97     metoprolol succinate ER (TOPROL-XL) 25 MG 24 hr tablet TAKE 1 TABLET BY MOUTH ONCE DAILY 28 tablet PRN     mirtazapine (REMERON) 7.5 MG tablet Take 2 tablets (15 mg) by mouth At Bedtime 28 tablet PRN     Multiple Vitamins-Minerals (PRESERVISION AREDS) CAPS TAKE 1 CAPSULE BY MOUTH TWICE DAILY 60 capsule 11     polyethylene glycol (MIRALAX) 17 g packet Take 17 g by mouth daily Hold for loose stools 17 g 11     QUEtiapine (SEROQUEL) 25 MG tablet Take 1 tablet (25 mg) by mouth 3 times daily 130.5 tablet 97     QUEtiapine (SEROQUEL) 25 MG tablet Take 1 tablet (25 mg) by mouth 2 times daily as needed (agitation) 30 tablet 0     SENEXON-S 8.6-50 MG tablet TAKE TWO TABLETS BY MOUTH TWICE DAILY HOLD FOR LOOSE STOOL 124 tablet PRN     traZODone (DESYREL) 100 MG tablet TAKE 1 TABLET BY MOUTH AT BEDTIME 28 tablet PRN     vitamin B-12 (CYANOCOBALAMIN) 100 MCG tablet TAKE 1 TABLET BY MOUTH ONCE DAILY 37 tablet 97     REVIEW OF SYSTEMS:  Limited secondary to cognitive impairment but today pt reports ok    Objective:  BP (!) 140/90   Pulse 90   Temp 97.5  F (36.4  C)   Resp 16   Ht 1.702 m (5' 7\")   SpO2 99%   BMI 19.58 kg/m    Exam:  GENERAL APPEARANCE:  Alert, in no distress but anxious at times   ENT:  Mouth and posterior oropharynx normal, dry mucous membranes, normal hearing acuity  EYES:  EOM, conjunctivae, lids, pupils and irises normal  RESP:  respiratory " effort and palpation of chest normal, lungs clear to auscultation   CV:  Palpation and auscultation of heart done , regular rate and rhythm, no murmur, rub, or gallop  ABDOMEN: hypoactive bowel sounds, some distention and firm  M/S:   Gait and station at baseline w/o assistive device   SKIN:  Inspection of skin and subcutaneous tissue baseline to visualized areas. Cyst/nodule hard on index finger right hand  NEURO:   Cranial nerves 2-12 are normal tested and grossly at patient's baseline  PSYCH:  insight and judgement impaired, memory impaired    Labs:   CBC RESULTS:   Recent Labs   Lab Test 02/28/20  0941 01/06/20  1530   WBC 8.6 7.9   RBC 4.15 4.70   HGB 13.3 14.7   HCT 40.1 44.3   MCV 97 94   MCH 32.0 31.3   MCHC 33.2 33.2   RDW 14.2 13.8    172       Last Basic Metabolic Panel:  Recent Labs   Lab Test 02/28/20  0941 01/12/20  0831  01/06/20  1530     --   --  141   POTASSIUM 4.8  --   --  3.8   CHLORIDE 109  --   --  111*   AMERICO 9.3  --   --  9.9   CO2 23  --   --  23   BUN 22  --   --  22   CR 0.95 1.09*   < > 1.02   GLC 78  --   --  93    < > = values in this interval not displayed.       Liver Function Studies -   Recent Labs   Lab Test 01/06/20  1530 07/10/14 08/03/12  0847   PROTTOTAL 7.7 6.9 6.6*   ALBUMIN 4.4  --  4.0   BILITOTAL 0.9 0.6 0.7   ALKPHOS 66 77 66   AST 31 19 21   ALT 29 10 19       TSH   Date Value Ref Range Status   07/10/2014 0.98 0.35 - 4.94 mcU/mL Final   08/03/2012 6.61 (H) 0.4 - 5.0 mU/L Final     TSH on 10/2/2019:                   2.21  Vitamin D total 10/2/2019:       46.1  Vitamin B12 10/2/2019:          368         No results found for: A1C    ASSESSMENT/PLAN:  (K59.01) Slow transit constipation  (primary encounter diagnosis)  Comment: ongoing   Plan:   -Senna S two tablets BID  -Miralax was every other day but with ongoing issue trial daily  -supp prn daily     (F03.91) Dementia with behavioral disturbance, unspecified dementia type (H)  (R45.1) Agitation  (F41.9)  Anxiety  Comment: flares with behaviors at times   Plan:   -melatonin and trazodone at HS for insomnia in addition to mirtazapine   -Seroquel 25 mg po TID and twice daily prn (as needed not used)    (M81.0) Age-related osteoporosis without current pathological fracture  Comment: chronic  Plan:   -review discontinuing alendronate with her son (left vm today)        Electronically signed by:  ZAFAR Bae CNP             Sincerely,        ZAFAR Bae CNP

## 2020-06-11 LAB
COVID-19 VIRUS PCR TO MAYO - RESULT: NORMAL
SPECIMEN SOURCE: NORMAL

## 2020-06-12 DIAGNOSIS — R52 GENERALIZED PAIN: ICD-10-CM

## 2020-06-12 RX ORDER — PSEUDOEPHED/ACETAMINOPH/DIPHEN 30MG-500MG
TABLET ORAL
Qty: 112 TABLET | Refills: 97 | Status: SHIPPED | OUTPATIENT
Start: 2020-06-12 | End: 2021-06-12

## 2020-06-29 DIAGNOSIS — H40.1133 PRIMARY OPEN ANGLE GLAUCOMA OF BOTH EYES, SEVERE STAGE: Primary | ICD-10-CM

## 2020-06-29 RX ORDER — LATANOPROST 50 UG/ML
SOLUTION/ DROPS OPHTHALMIC
Qty: 2.5 ML | Refills: 97 | Status: SHIPPED | OUTPATIENT
Start: 2020-06-29 | End: 2021-10-08

## 2020-07-01 ENCOUNTER — ALLIED HEALTH/NURSE VISIT (OUTPATIENT)
Dept: PHARMACY | Facility: CLINIC | Age: 85
End: 2020-07-01
Payer: COMMERCIAL

## 2020-07-01 DIAGNOSIS — G47.00 INSOMNIA, UNSPECIFIED TYPE: ICD-10-CM

## 2020-07-01 DIAGNOSIS — F03.91 DEMENTIA WITH BEHAVIORAL DISTURBANCE, UNSPECIFIED DEMENTIA TYPE: Primary | ICD-10-CM

## 2020-07-01 DIAGNOSIS — M81.0 AGE-RELATED OSTEOPOROSIS WITHOUT CURRENT PATHOLOGICAL FRACTURE: ICD-10-CM

## 2020-07-01 DIAGNOSIS — F41.9 ANXIETY: ICD-10-CM

## 2020-07-01 DIAGNOSIS — F33.41 RECURRENT MAJOR DEPRESSION IN PARTIAL REMISSION (H): ICD-10-CM

## 2020-07-01 PROCEDURE — 99606 MTMS BY PHARM EST 15 MIN: CPT | Performed by: PHARMACIST

## 2020-07-01 RX ORDER — TRAZODONE HYDROCHLORIDE 50 MG/1
75 TABLET, FILM COATED ORAL AT BEDTIME
Qty: 30 TABLET | Refills: 3 | Status: SHIPPED | OUTPATIENT
Start: 2020-07-01 | End: 2020-08-10

## 2020-07-01 NOTE — PROGRESS NOTES
"MTM ENCOUNTER  SUBJECTIVE/OBJECTIVE:                           Annabel May is a 86 year old female called for a follow-up visit.  I spoke with pt's son Kelvin due to cognitive impairment; consent to communicate on file.  She was referred to me from Reese Morfin NP.  Today's visit is a follow-up MTM visit from 5/13/20.     Patient consented to a telehealth visit: yes  Telemedicine Visit Details  Type of service:  Telephone visit  Start Time: 2pm  End Time: 2:10pm  Originating Location (pt. Location): Home  Distant Location (provider location):  [facility]  Mode of Communication:  Telephone    Chief Complaint: follow-up from 5/13/20 MTM visit.      Allergies/ADRs: Reviewed in EHR  Tobacco:  reports that she has never smoked. She has never used smokeless tobacco.  Alcohol: not currently using  Caffeine: no caffeine  Activity: has 4-wheel walker, unclear to me if she uses this regularly  PMH: Reviewed in EHR    Medication Adherence/Access: no issues reported  Patient has assistance with medication administration: assisted living.    Dementia w/ behavioral disturbance, Agitation/Anxiety, Insomnia: Current medications include Mirtazapine 15mg at bedtime, Quetiapine 25mg three times daily and 25mg twice daily as needed, Trazodone 100mg at bedtime, Melatonin 5mg at bedtime.  Mirtazapine was initiated at end of February with goal to help with anxiety, sleep, and allow future reduction of Quetiapine dose.  Quetiapine has been reduced some since the addition of Mirtazapine with last reduction of scheduled dose on 4/25/20 from 37.5mg tid to 25mg tid.  Mirtazapine has been helpful for sleep; no reports of difficulty sleeping reported per nursing.  Prn Quetiapine has been used 7 different times since 6/15, typically between noon and 3:30pm, no more than once in a day.  Staff has noted \"somewhat effective\" when given.  Son states today that staff has informed him that she will get anxious and worried " about her sons, but will then calm down, and nursing staff noted to me that she will have bouts with anxiety, but can be easily reassured and redirected for the most part.  Also noted that she will get more agitated if constipated; staff monitors this best they can given patient will toilet herself.    Osteoporosis: Current therapy includes: alendronate (Fosamax) 70mg weekly (Pt has been on current therapy for at least 8-9 years). Due to cognitive impairment, difficult to know if pt is experiencing side effects.   Last vitamin D level: 10/2/19 = 46.1  DEXA History: 12/13/19:  T score -2.6 at femoral neck. The patient's 10 year probability of suffering a major osteoporotic fracture is 18 % and the 10 year probability of suffering a hip fracture is 6.8 %   Risk factors: post-menopausal, frail, h/o fall      Est CrCl (Cockroft-Gault) = 37ml/min (based on Scr 0.95 & adj BW 55.8kg)      ASSESSMENT:                              Medication Adherence: good, no issues identified    Dementia w/ behavioral disturbance, Agitation/Anxiety, Insomnia:  May benefit from reduction in Trazodone to 75mg nightly.  Prior to Mirtazapine being initiated, pt had been having difficulty with sleep, and appears Mirtazapine has been helpful for this, however, Trazodone didn't appear to offer benefit for sleep.  Reduction in meds that may contribute to falls, confusion is helpful, and pt may therefore benefit from reduction in Trazodone dose.  Discussed with son, and he is agreeable.    Osteoporosis:  May be of benefit to consider d'c Alendronate.  Pt is high fracture risk, however,  has been on for at least 8-9 years per son's estimation, Alendronate with very long elimination half-life, and pt's kidney function is borderline for continued use (should avoid when CrCl<35).  Possible that Alendronate could be contributing to gi adverse effects that pt has difficulty vocalizing due to dementia, and likely unable to follow instructions (need to  stay upright for 30min or more AND until after first food of day to avoid esophageal irritation).  Discussed with Kelvin, and he is agreeable with d'c.    PLAN:                            1.  Provider may consider reduction in Trazodone to 75mg nightly.  2.  Provider may consider d'c Alendronate.    I spent 10 minutes total with this patient and discussion with NP today. A copy of the visit note was provided to the patient's referring provider.    Will follow up in 1 month, sooner if necessary.    The patient declined a summary of these recommendations.     Marci Taylor, Pharm.D.,[facility]  Board Certified Geriatric Pharmacist  Medication Therapy Management Pharmacist  [phone]

## 2020-07-10 DIAGNOSIS — F01.518 VASCULAR DEMENTIA WITH BEHAVIOR DISTURBANCE (H): ICD-10-CM

## 2020-07-13 RX ORDER — QUETIAPINE FUMARATE 25 MG/1
TABLET, FILM COATED ORAL
Qty: 84 TABLET | Refills: 97 | Status: SHIPPED | OUTPATIENT
Start: 2020-07-13 | End: 2020-08-12

## 2020-07-16 ENCOUNTER — HOSPITAL LABORATORY (OUTPATIENT)
Dept: OTHER | Facility: CLINIC | Age: 85
End: 2020-07-16

## 2020-07-22 LAB
COVID-19 VIRUS PCR TO MAYO - RESULT: NORMAL
SPECIMEN SOURCE: NORMAL

## 2020-07-29 ENCOUNTER — TELEPHONE (OUTPATIENT)
Dept: PHARMACY | Facility: CLINIC | Age: 85
End: 2020-07-29

## 2020-07-30 ENCOUNTER — HOSPITAL LABORATORY (OUTPATIENT)
Dept: OTHER | Facility: CLINIC | Age: 85
End: 2020-07-30

## 2020-07-31 NOTE — TELEPHONE ENCOUNTER
Attempted phone call for follow-up MTM with pt's son, Kelvin, due to pt's cognitive impairment.  No answer, left voice mail for Kelvin.  If don't hear back, will follow-up in one month, sooner if necessary.    Marci Taylor, Pharm.D.,[facility]  Board Certified Geriatric Pharmacist  Medication Therapy Management Pharmacist  [phone]

## 2020-08-04 LAB
SARS-COV-2 RNA SPEC QL NAA+PROBE: NOT DETECTED
SPECIMEN SOURCE: NORMAL

## 2020-08-09 ENCOUNTER — HOSPITAL LABORATORY (OUTPATIENT)
Dept: OTHER | Facility: CLINIC | Age: 85
End: 2020-08-09

## 2020-08-09 DIAGNOSIS — F33.41 RECURRENT MAJOR DEPRESSION IN PARTIAL REMISSION (H): ICD-10-CM

## 2020-08-10 RX ORDER — TRAZODONE HYDROCHLORIDE 150 MG/1
TABLET ORAL
Qty: 15 TABLET | Refills: 97 | Status: SHIPPED | OUTPATIENT
Start: 2020-08-10 | End: 2020-08-27

## 2020-08-12 ENCOUNTER — ASSISTED LIVING VISIT (OUTPATIENT)
Dept: GERIATRICS | Facility: CLINIC | Age: 85
End: 2020-08-12
Payer: MEDICARE

## 2020-08-12 VITALS
TEMPERATURE: 95.3 F | SYSTOLIC BLOOD PRESSURE: 100 MMHG | OXYGEN SATURATION: 96 % | RESPIRATION RATE: 16 BRPM | DIASTOLIC BLOOD PRESSURE: 80 MMHG | HEART RATE: 98 BPM

## 2020-08-12 DIAGNOSIS — F41.9 ANXIETY: ICD-10-CM

## 2020-08-12 DIAGNOSIS — R45.1 AGITATION: ICD-10-CM

## 2020-08-12 DIAGNOSIS — K59.01 SLOW TRANSIT CONSTIPATION: Primary | ICD-10-CM

## 2020-08-12 DIAGNOSIS — F03.91 DEMENTIA WITH BEHAVIORAL DISTURBANCE, UNSPECIFIED DEMENTIA TYPE: ICD-10-CM

## 2020-08-12 PROBLEM — E53.8 B12 DEFICIENCY: Status: ACTIVE | Noted: 2018-11-13

## 2020-08-12 RX ORDER — QUETIAPINE FUMARATE 25 MG/1
25 TABLET, FILM COATED ORAL 3 TIMES DAILY
Qty: 84 TABLET | Refills: 97 | COMMUNITY
Start: 2020-08-12 | End: 2021-07-12

## 2020-08-12 RX ORDER — POLYETHYLENE GLYCOL 3350 17 G/17G
1 POWDER, FOR SOLUTION ORAL DAILY
Qty: 17 G | Refills: 11 | Status: SHIPPED | OUTPATIENT
Start: 2020-08-12 | End: 2021-11-09

## 2020-08-12 NOTE — LETTER
8/12/2020        RE: Annabel Calixto  [address]  [place] [address]        [facility]  [facility] Medical Record Number: [id number]  Place of Service where encounter took place: [facility] [[id number]]  Chief Complaint   Patient presents with     RECHECK       HPI:    Annabel May is a 86 year old (5/12/1934), who is being seen today for an episodic care visit. HPI information obtained from: facility chart records and facility staff. Today's concern is:    Continues to have ongoing constipation. Has history of and contributes to increased agitation and behaviors. Received several prn suppositories  With some effectiveness. Is not able to give much history with advanced dementia.    Less reporting of behaviors. Used prn Seroquel five times in July and twice to date this month. Calm and pleasant today. Appears in a good mood.      Past Medical and Surgical History reviewed in Epic today.    MEDICATIONS:    Current Outpatient Medications   Medication Sig Dispense Refill     QUEtiapine (SEROQUEL) 25 MG tablet Take 1 tablet (25 mg) by mouth 3 times daily 84 tablet 97     ACETAMINOPHEN EXTRA STRENGTH 500 MG tablet TAKE TWO TABLETS (1000MG) BY MOUTH TWICE DAILY;AND MAY TAKE ONE TABLET (500MG) BY MOUTH TWICE DAILY AS NEEDED FOR MILD PAIN 112 tablet 97     AZOPT 1 % ophthalmic suspension INSTILL ONE DROP IN EACH EYE TWICE DAILY 10 mL 97     bisacodyl (DULCOLAX) 10 MG suppository Place 1 suppository (10 mg) rectally daily as needed for constipation Give if no record bowel movement x 3 days 10 suppository 11     ELIQUIS ANTICOAGULANT 2.5 MG tablet TAKE 1 TABLET BY MOUTH TWICE DAILY 80 tablet 11     latanoprost (XALATAN) 0.005 % ophthalmic solution INSTILL ONE DROP IN EACH EYE AT BEDTIME 2.5 mL 97     levothyroxine (SYNTHROID/LEVOTHROID) 75 MCG tablet TAKE 1 TABLET BY MOUTH ONCE DAILY 28 tablet PRN     MELATONIN MAXIMUM STRENGTH 5 MG tablet TAKE 1  TABLET BY MOUTH AT BEDTIME 37 tablet 97     metoprolol succinate ER (TOPROL-XL) 25 MG 24 hr tablet TAKE 1 TABLET BY MOUTH ONCE DAILY 28 tablet PRN     mirtazapine (REMERON) 15 MG tablet Take 1 tablet by mouth At Bedtime       Multiple Vitamins-Minerals (PRESERVISION AREDS) CAPS TAKE 1 CAPSULE BY MOUTH TWICE DAILY 60 capsule 11     polyethylene glycol (MIRALAX) 17 g packet Take 17 g by mouth daily Hold for loose stools 17 g 11     QUEtiapine (SEROQUEL) 25 MG tablet Take 1 tablet (25 mg) by mouth 2 times daily as needed (agitation) 30 tablet 0     SENEXON-S 8.6-50 MG tablet TAKE TWO TABLETS BY MOUTH TWICE DAILY HOLD FOR LOOSE STOOL 124 tablet PRN     traZODone (DESYREL) 150 MG tablet TAKE ONE-HALF TABLET (75MG) BY MOUTH AT BEDTIME  15 tablet 97     vitamin B-12 (CYANOCOBALAMIN) 100 MCG tablet TAKE 1 TABLET BY MOUTH ONCE DAILY 37 tablet 97     REVIEW OF SYSTEMS:  Limited secondary to cognitive impairment but today pt reports ok    Objective:  /80   Pulse 98   Temp 95.3  F (35.2  C)   Resp 16   SpO2 96%   Exam:  GENERAL APPEARANCE:  Alert, in no distress but anxious at times   ENT:  Mouth and posterior oropharynx normal, dry mucous membranes, normal hearing acuity  EYES:  EOM, conjunctivae, lids, pupils and irises normal  RESP:  respiratory effort and palpation of chest normal, lungs clear to auscultation   CV:  Palpation and auscultation of heart done , regular rate and rhythm, no murmur, rub, or gallop  ABDOMEN: hypoactive bowel sounds, some distention and firm is baseline   M/S:   Gait and station at baseline w/o assistive device   SKIN:  Inspection of skin and subcutaneous tissue baseline to visualized areas. Cyst/nodule hard on index finger right hand  NEURO:   Cranial nerves 2-12 are normal tested and grossly at patient's baseline  PSYCH:  insight and judgement impaired, memory impaired    Labs:   CBC RESULTS:   Recent Labs   Lab Test 02/28/20  0941 01/06/20  1530   WBC 8.6 7.9   RBC 4.15 4.70   HGB 13.3  14.7   HCT 40.1 44.3   MCV 97 94   MCH 32.0 31.3   MCHC 33.2 33.2   RDW 14.2 13.8    172       Last Basic Metabolic Panel:  Recent Labs   Lab Test 02/28/20  0941 01/12/20  0831  01/06/20  1530     --   --  141   POTASSIUM 4.8  --   --  3.8   CHLORIDE 109  --   --  111*   AMERICO 9.3  --   --  9.9   CO2 23  --   --  23   BUN 22  --   --  22   CR 0.95 1.09*   < > 1.02   GLC 78  --   --  93    < > = values in this interval not displayed.       Liver Function Studies -   Recent Labs   Lab Test 01/06/20  1530 07/10/14 08/03/12  0847   PROTTOTAL 7.7 6.9 6.6*   ALBUMIN 4.4  --  4.0   BILITOTAL 0.9 0.6 0.7   ALKPHOS 66 77 66   AST 31 19 21   ALT 29 10 19       TSH   Date Value Ref Range Status   07/10/2014 0.98 0.35 - 4.94 mcU/mL Final   08/03/2012 6.61 (H) 0.4 - 5.0 mU/L Final     TSH on 10/2/2019 2.21    No results found for: A1C      ASSESSMENT/PLAN:  (K59.01) Slow transit constipation  (primary encounter diagnosis)  Comment: ongoing   Plan:   -Senna S two tablets BID  -Miralax was every other day now daily. Will increase to BID 3 times weekly   -supp prn daily      (F03.91) Dementia with behavioral disturbance, unspecified dementia type (H)  (R45.1) Agitation  (F41.9) Anxiety  Comment: flares with behaviors at times   Plan:   -melatonin and trazodone at HS for insomnia in addition to mirtazapine   -Seroquel 25 mg po TID and twice daily prn             Electronically signed by:  ZAFAR Bae CNP         Sincerely,        ZAFAR Bae CNP

## 2020-08-12 NOTE — LETTER
8/12/2020        RE: Annabel Calixto  [address]  [place] [address]        [facility]  [facility] Record Number: [id number]  Place of Service where encounter took place: [facility] [[id number]]  Chief Complaint   Patient presents with     RECHECK       HPI:    Annabel May is a 86 year old (5/12/1934), who is being seen today for an episodic care visit. HPI information obtained from: facility chart records and facility staff. Today's concern is:    Continues to have ongoing constipation. Has history of and contributes to increased agitation and behaviors. Received several prn suppositories  With some effectiveness. Is not able to give much history with advanced dementia.    Less reporting of behaviors. Used prn Seroquel five times in July and twice to date this month. Calm and pleasant today. Appears in a good mood.      Past Medical and Surgical History reviewed in Epic today.    MEDICATIONS:    Current Outpatient Medications   Medication Sig Dispense Refill     QUEtiapine (SEROQUEL) 25 MG tablet Take 1 tablet (25 mg) by mouth 3 times daily 84 tablet 97     ACETAMINOPHEN EXTRA STRENGTH 500 MG tablet TAKE TWO TABLETS (1000MG) BY MOUTH TWICE DAILY;AND MAY TAKE ONE TABLET (500MG) BY MOUTH TWICE DAILY AS NEEDED FOR MILD PAIN 112 tablet 97     AZOPT 1 % ophthalmic suspension INSTILL ONE DROP IN EACH EYE TWICE DAILY 10 mL 97     bisacodyl (DULCOLAX) 10 MG suppository Place 1 suppository (10 mg) rectally daily as needed for constipation Give if no record bowel movement x 3 days 10 suppository 11     ELIQUIS ANTICOAGULANT 2.5 MG tablet TAKE 1 TABLET BY MOUTH TWICE DAILY 80 tablet 11     latanoprost (XALATAN) 0.005 % ophthalmic solution INSTILL ONE DROP IN EACH EYE AT BEDTIME 2.5 mL 97     levothyroxine (SYNTHROID/LEVOTHROID) 75 MCG tablet TAKE 1 TABLET BY MOUTH ONCE DAILY 28 tablet PRN     MELATONIN MAXIMUM STRENGTH 5 MG tablet TAKE 1  TABLET BY MOUTH AT BEDTIME 37 tablet 97     metoprolol succinate ER (TOPROL-XL) 25 MG 24 hr tablet TAKE 1 TABLET BY MOUTH ONCE DAILY 28 tablet PRN     mirtazapine (REMERON) 15 MG tablet Take 1 tablet by mouth At Bedtime       Multiple Vitamins-Minerals (PRESERVISION AREDS) CAPS TAKE 1 CAPSULE BY MOUTH TWICE DAILY 60 capsule 11     polyethylene glycol (MIRALAX) 17 g packet Take 17 g by mouth daily Hold for loose stools 17 g 11     QUEtiapine (SEROQUEL) 25 MG tablet Take 1 tablet (25 mg) by mouth 2 times daily as needed (agitation) 30 tablet 0     SENEXON-S 8.6-50 MG tablet TAKE TWO TABLETS BY MOUTH TWICE DAILY HOLD FOR LOOSE STOOL 124 tablet PRN     traZODone (DESYREL) 150 MG tablet TAKE ONE-HALF TABLET (75MG) BY MOUTH AT BEDTIME ***NOTE DOSAGE/STRENGTH*** 15 tablet 97     vitamin B-12 (CYANOCOBALAMIN) 100 MCG tablet TAKE 1 TABLET BY MOUTH ONCE DAILY 37 tablet 97     REVIEW OF SYSTEMS:  Limited secondary to cognitive impairment but today pt reports ok    Objective:  /80   Pulse 98   Temp 95.3  F (35.2  C)   Resp 16   SpO2 96%   Exam:  GENERAL APPEARANCE:  Alert, in no distress but anxious at times   ENT:  Mouth and posterior oropharynx normal, dry mucous membranes, normal hearing acuity  EYES:  EOM, conjunctivae, lids, pupils and irises normal  RESP:  respiratory effort and palpation of chest normal, lungs clear to auscultation   CV:  Palpation and auscultation of heart done , regular rate and rhythm, no murmur, rub, or gallop  ABDOMEN: hypoactive bowel sounds, some distention and firm is baseline   M/S:   Gait and station at baseline w/o assistive device   SKIN:  Inspection of skin and subcutaneous tissue baseline to visualized areas. Cyst/nodule hard on index finger right hand  NEURO:   Cranial nerves 2-12 are normal tested and grossly at patient's baseline  PSYCH:  insight and judgement impaired, memory impaired    Labs:   CBC RESULTS:   Recent Labs   Lab Test 02/28/20  0941 01/06/20  1530   WBC 8.6 7.9    RBC 4.15 4.70   HGB 13.3 14.7   HCT 40.1 44.3   MCV 97 94   MCH 32.0 31.3   MCHC 33.2 33.2   RDW 14.2 13.8    172       Last Basic Metabolic Panel:  Recent Labs   Lab Test 02/28/20  0941 01/12/20  0831  01/06/20  1530     --   --  141   POTASSIUM 4.8  --   --  3.8   CHLORIDE 109  --   --  111*   AMERICO 9.3  --   --  9.9   CO2 23  --   --  23   BUN 22  --   --  22   CR 0.95 1.09*   < > 1.02   GLC 78  --   --  93    < > = values in this interval not displayed.       Liver Function Studies -   Recent Labs   Lab Test 01/06/20  1530 07/10/14 08/03/12  0847   PROTTOTAL 7.7 6.9 6.6*   ALBUMIN 4.4  --  4.0   BILITOTAL 0.9 0.6 0.7   ALKPHOS 66 77 66   AST 31 19 21   ALT 29 10 19       TSH   Date Value Ref Range Status   07/10/2014 0.98 0.35 - 4.94 mcU/mL Final   08/03/2012 6.61 (H) 0.4 - 5.0 mU/L Final     TSH on 10/2/2019 2.21    No results found for: A1C      ASSESSMENT/PLAN:  (K59.01) Slow transit constipation  (primary encounter diagnosis)  Comment: ongoing   Plan:   -Senna S two tablets BID  -Miralax was every other day now daily. Will increase to BID 3 times weekly   -supp prn daily      (F03.91) Dementia with behavioral disturbance, unspecified dementia type (H)  (R45.1) Agitation  (F41.9) Anxiety  Comment: flares with behaviors at times   Plan:   -melatonin and trazodone at HS for insomnia in addition to mirtazapine   -Seroquel 25 mg po TID and twice daily prn             Electronically signed by:  ZAFAR Bae CNP         Sincerely,        ZAFAR Bae CNP

## 2020-08-12 NOTE — PROGRESS NOTES
[facility]  [facility] Record Number: [id number]  Place of Service where encounter took place: [facility] [[id number]]  Chief Complaint   Patient presents with     RECHECK       HPI:    Annabel May is a 86 year old (5/12/1934), who is being seen today for an episodic care visit. HPI information obtained from: facility chart records and facility staff. Today's concern is:    Continues to have ongoing constipation. Has history of and contributes to increased agitation and behaviors. Received several prn suppositories  With some effectiveness. Is not able to give much history with advanced dementia.    Less reporting of behaviors. Used prn Seroquel five times in July and twice to date this month. Calm and pleasant today. Appears in a good mood.      Past Medical and Surgical History reviewed in Epic today.    MEDICATIONS:    Current Outpatient Medications   Medication Sig Dispense Refill     QUEtiapine (SEROQUEL) 25 MG tablet Take 1 tablet (25 mg) by mouth 3 times daily 84 tablet 97     ACETAMINOPHEN EXTRA STRENGTH 500 MG tablet TAKE TWO TABLETS (1000MG) BY MOUTH TWICE DAILY;AND MAY TAKE ONE TABLET (500MG) BY MOUTH TWICE DAILY AS NEEDED FOR MILD PAIN 112 tablet 97     AZOPT 1 % ophthalmic suspension INSTILL ONE DROP IN EACH EYE TWICE DAILY 10 mL 97     bisacodyl (DULCOLAX) 10 MG suppository Place 1 suppository (10 mg) rectally daily as needed for constipation Give if no record bowel movement x 3 days 10 suppository 11     ELIQUIS ANTICOAGULANT 2.5 MG tablet TAKE 1 TABLET BY MOUTH TWICE DAILY 80 tablet 11     latanoprost (XALATAN) 0.005 % ophthalmic solution INSTILL ONE DROP IN EACH EYE AT BEDTIME 2.5 mL 97     levothyroxine (SYNTHROID/LEVOTHROID) 75 MCG tablet TAKE 1 TABLET BY MOUTH ONCE DAILY 28 tablet PRN     MELATONIN MAXIMUM STRENGTH 5 MG tablet TAKE 1 TABLET BY MOUTH AT BEDTIME 37 tablet 97     metoprolol succinate ER (TOPROL-XL) 25 MG 24 hr tablet TAKE 1  TABLET BY MOUTH ONCE DAILY 28 tablet PRN     mirtazapine (REMERON) 15 MG tablet Take 1 tablet by mouth At Bedtime       Multiple Vitamins-Minerals (PRESERVISION AREDS) CAPS TAKE 1 CAPSULE BY MOUTH TWICE DAILY 60 capsule 11     polyethylene glycol (MIRALAX) 17 g packet Take 17 g by mouth daily Hold for loose stools 17 g 11     QUEtiapine (SEROQUEL) 25 MG tablet Take 1 tablet (25 mg) by mouth 2 times daily as needed (agitation) 30 tablet 0     SENEXON-S 8.6-50 MG tablet TAKE TWO TABLETS BY MOUTH TWICE DAILY HOLD FOR LOOSE STOOL 124 tablet PRN     traZODone (DESYREL) 150 MG tablet TAKE ONE-HALF TABLET (75MG) BY MOUTH AT BEDTIME  15 tablet 97     vitamin B-12 (CYANOCOBALAMIN) 100 MCG tablet TAKE 1 TABLET BY MOUTH ONCE DAILY 37 tablet 97     REVIEW OF SYSTEMS:  Limited secondary to cognitive impairment but today pt reports ok    Objective:  /80   Pulse 98   Temp 95.3  F (35.2  C)   Resp 16   SpO2 96%   Exam:  GENERAL APPEARANCE:  Alert, in no distress but anxious at times   ENT:  Mouth and posterior oropharynx normal, dry mucous membranes, normal hearing acuity  EYES:  EOM, conjunctivae, lids, pupils and irises normal  RESP:  respiratory effort and palpation of chest normal, lungs clear to auscultation   CV:  Palpation and auscultation of heart done , regular rate and rhythm, no murmur, rub, or gallop  ABDOMEN: hypoactive bowel sounds, some distention and firm is baseline   M/S:   Gait and station at baseline w/o assistive device   SKIN:  Inspection of skin and subcutaneous tissue baseline to visualized areas. Cyst/nodule hard on index finger right hand  NEURO:   Cranial nerves 2-12 are normal tested and grossly at patient's baseline  PSYCH:  insight and judgement impaired, memory impaired    Labs:   CBC RESULTS:   Recent Labs   Lab Test 02/28/20  0941 01/06/20  1530   WBC 8.6 7.9   RBC 4.15 4.70   HGB 13.3 14.7   HCT 40.1 44.3   MCV 97 94   MCH 32.0 31.3   MCHC 33.2 33.2   RDW 14.2 13.8    172        Last Basic Metabolic Panel:  Recent Labs   Lab Test 02/28/20  0941 01/12/20  0831  01/06/20  1530     --   --  141   POTASSIUM 4.8  --   --  3.8   CHLORIDE 109  --   --  111*   AMERICO 9.3  --   --  9.9   CO2 23  --   --  23   BUN 22  --   --  22   CR 0.95 1.09*   < > 1.02   GLC 78  --   --  93    < > = values in this interval not displayed.       Liver Function Studies -   Recent Labs   Lab Test 01/06/20  1530 07/10/14 08/03/12  0847   PROTTOTAL 7.7 6.9 6.6*   ALBUMIN 4.4  --  4.0   BILITOTAL 0.9 0.6 0.7   ALKPHOS 66 77 66   AST 31 19 21   ALT 29 10 19       TSH   Date Value Ref Range Status   07/10/2014 0.98 0.35 - 4.94 mcU/mL Final   08/03/2012 6.61 (H) 0.4 - 5.0 mU/L Final     TSH on 10/2/2019 2.21    No results found for: A1C      ASSESSMENT/PLAN:  (K59.01) Slow transit constipation  (primary encounter diagnosis)  Comment: ongoing   Plan:   -Senna S two tablets BID  -Miralax was every other day now daily. Will increase to BID 3 times weekly   -supp prn daily      (F03.91) Dementia with behavioral disturbance, unspecified dementia type (H)  (R45.1) Agitation  (F41.9) Anxiety  Comment: flares with behaviors at times   Plan:   -melatonin and trazodone at HS for insomnia in addition to mirtazapine   -Seroquel 25 mg po TID and twice daily prn             Electronically signed by:  Reese Morfin, APRN CNP

## 2020-08-15 LAB
SARS-COV-2 RNA SPEC QL NAA+PROBE: NOT DETECTED
SPECIMEN SOURCE: NORMAL

## 2020-08-20 ENCOUNTER — HOSPITAL LABORATORY (OUTPATIENT)
Dept: OTHER | Facility: CLINIC | Age: 85
End: 2020-08-20

## 2020-08-26 ENCOUNTER — HOSPITAL LABORATORY (OUTPATIENT)
Dept: OTHER | Facility: CLINIC | Age: 85
End: 2020-08-26

## 2020-08-26 LAB
SARS-COV-2 RNA SPEC QL NAA+PROBE: NOT DETECTED
SPECIMEN SOURCE: NORMAL

## 2020-08-27 ENCOUNTER — TELEPHONE (OUTPATIENT)
Dept: PHARMACY | Facility: CLINIC | Age: 85
End: 2020-08-27

## 2020-08-27 DIAGNOSIS — F33.41 RECURRENT MAJOR DEPRESSION IN PARTIAL REMISSION (H): ICD-10-CM

## 2020-08-27 LAB
SARS-COV-2 RNA SPEC QL NAA+PROBE: NOT DETECTED
SPECIMEN SOURCE: NORMAL

## 2020-08-27 RX ORDER — TRAZODONE HYDROCHLORIDE 50 MG/1
50 TABLET, FILM COATED ORAL AT BEDTIME
Qty: 30 TABLET | Refills: 3 | Status: SHIPPED | OUTPATIENT
Start: 2020-08-27 | End: 2020-12-03

## 2020-08-27 NOTE — TELEPHONE ENCOUNTER
Called Kelvin to update on Trazodone reduction today from 75mg daily to 50mg daily as per our previous discussion/plan to continue with taper/d'c since Mirtazapine was added and has been helpful for patient with sleep.  Left voicemail for Kelvin to return call.    Will follow-up in 1-2 months, sooner if necessary.    Marci Taylor, Pharm.D.,[facility]  Board Certified Geriatric Pharmacist  Medication Therapy Management Pharmacist  [phone]

## 2020-09-03 ENCOUNTER — HOSPITAL LABORATORY (OUTPATIENT)
Dept: OTHER | Facility: CLINIC | Age: 85
End: 2020-09-03

## 2020-09-09 LAB
SARS-COV-2 RNA SPEC QL NAA+PROBE: NOT DETECTED
SPECIMEN SOURCE: NORMAL

## 2020-09-10 ENCOUNTER — HOSPITAL LABORATORY (OUTPATIENT)
Dept: OTHER | Facility: CLINIC | Age: 85
End: 2020-09-10

## 2020-09-14 LAB
SARS-COV-2 RNA SPEC QL NAA+PROBE: NOT DETECTED
SPECIMEN SOURCE: NORMAL

## 2020-09-14 ASSESSMENT — MIFFLIN-ST. JEOR: SCORE: 1013.31

## 2020-09-19 ENCOUNTER — APPOINTMENT (OUTPATIENT)
Dept: CT IMAGING | Facility: CLINIC | Age: 85
End: 2020-09-19
Attending: EMERGENCY MEDICINE
Payer: MEDICARE

## 2020-09-19 ENCOUNTER — HOSPITAL ENCOUNTER (EMERGENCY)
Facility: CLINIC | Age: 85
Discharge: HOME | End: 2020-09-19
Attending: EMERGENCY MEDICINE | Admitting: EMERGENCY MEDICINE
Payer: MEDICARE

## 2020-09-19 VITALS
SYSTOLIC BLOOD PRESSURE: 137 MMHG | DIASTOLIC BLOOD PRESSURE: 90 MMHG | RESPIRATION RATE: 14 BRPM | HEART RATE: 69 BPM | BODY MASS INDEX: 19.01 KG/M2 | HEIGHT: 68 IN | TEMPERATURE: 97.8 F | OXYGEN SATURATION: 98 %

## 2020-09-19 DIAGNOSIS — R20.2 PARESTHESIAS: ICD-10-CM

## 2020-09-19 DIAGNOSIS — R20.2 PARESTHESIA OF BOTH HANDS: ICD-10-CM

## 2020-09-19 LAB
ANION GAP SERPL CALCULATED.3IONS-SCNC: 5 MMOL/L (ref 3–14)
APTT PPP: 31 SEC (ref 22–37)
BASOPHILS # BLD AUTO: 0 10E9/L (ref 0–0.2)
BASOPHILS NFR BLD AUTO: 0.5 %
BUN SERPL-MCNC: 14 MG/DL (ref 7–30)
CALCIUM SERPL-MCNC: 9.3 MG/DL (ref 8.5–10.1)
CHLORIDE SERPL-SCNC: 110 MMOL/L (ref 94–109)
CO2 SERPL-SCNC: 24 MMOL/L (ref 20–32)
CREAT SERPL-MCNC: 0.89 MG/DL (ref 0.52–1.04)
DIFFERENTIAL METHOD BLD: ABNORMAL
EOSINOPHIL # BLD AUTO: 0.1 10E9/L (ref 0–0.7)
EOSINOPHIL NFR BLD AUTO: 1.9 %
ERYTHROCYTE [DISTWIDTH] IN BLOOD BY AUTOMATED COUNT: 14.3 % (ref 10–15)
GFR SERPL CREATININE-BSD FRML MDRD: 59 ML/MIN/{1.73_M2}
GLUCOSE SERPL-MCNC: 85 MG/DL (ref 70–99)
HCT VFR BLD AUTO: 42.5 % (ref 35–47)
HGB BLD-MCNC: 13.9 G/DL (ref 11.7–15.7)
IMM GRANULOCYTES # BLD: 0 10E9/L (ref 0–0.4)
IMM GRANULOCYTES NFR BLD: 0.1 %
INR PPP: 1.11 (ref 0.86–1.14)
INTERPRETATION ECG - MUSE: NORMAL
LYMPHOCYTES # BLD AUTO: 2.4 10E9/L (ref 0.8–5.3)
LYMPHOCYTES NFR BLD AUTO: 32.7 %
MCH RBC QN AUTO: 33.4 PG (ref 26.5–33)
MCHC RBC AUTO-ENTMCNC: 32.7 G/DL (ref 31.5–36.5)
MCV RBC AUTO: 102 FL (ref 78–100)
MONOCYTES # BLD AUTO: 0.7 10E9/L (ref 0–1.3)
MONOCYTES NFR BLD AUTO: 9.4 %
NEUTROPHILS # BLD AUTO: 4.1 10E9/L (ref 1.6–8.3)
NEUTROPHILS NFR BLD AUTO: 55.4 %
NRBC # BLD AUTO: 0 10*3/UL
NRBC BLD AUTO-RTO: 0 /100
PLATELET # BLD AUTO: 177 10E9/L (ref 150–450)
POTASSIUM SERPL-SCNC: 3.8 MMOL/L (ref 3.4–5.3)
RBC # BLD AUTO: 4.16 10E12/L (ref 3.8–5.2)
SODIUM SERPL-SCNC: 139 MMOL/L (ref 133–144)
WBC # BLD AUTO: 7.3 10E9/L (ref 4–11)

## 2020-09-19 PROCEDURE — 85730 THROMBOPLASTIN TIME PARTIAL: CPT | Performed by: EMERGENCY MEDICINE

## 2020-09-19 PROCEDURE — 0042T CT HEAD PERFUSION WITH CONTRAST: CPT

## 2020-09-19 PROCEDURE — 80048 BASIC METABOLIC PNL TOTAL CA: CPT | Performed by: EMERGENCY MEDICINE

## 2020-09-19 PROCEDURE — 85025 COMPLETE CBC W/AUTO DIFF WBC: CPT | Performed by: EMERGENCY MEDICINE

## 2020-09-19 PROCEDURE — 70450 CT HEAD/BRAIN W/O DYE: CPT | Mod: XS

## 2020-09-19 PROCEDURE — 70496 CT ANGIOGRAPHY HEAD: CPT

## 2020-09-19 PROCEDURE — 72125 CT NECK SPINE W/O DYE: CPT

## 2020-09-19 PROCEDURE — 99291 CRITICAL CARE FIRST HOUR: CPT | Mod: 25

## 2020-09-19 PROCEDURE — 93005 ELECTROCARDIOGRAM TRACING: CPT

## 2020-09-19 PROCEDURE — 25000128 H RX IP 250 OP 636: Performed by: EMERGENCY MEDICINE

## 2020-09-19 PROCEDURE — 96374 THER/PROPH/DIAG INJ IV PUSH: CPT | Mod: 59

## 2020-09-19 PROCEDURE — 25000125 ZZHC RX 250: Performed by: EMERGENCY MEDICINE

## 2020-09-19 PROCEDURE — 85610 PROTHROMBIN TIME: CPT | Performed by: EMERGENCY MEDICINE

## 2020-09-19 RX ORDER — LORAZEPAM 2 MG/ML
0.5 INJECTION INTRAMUSCULAR ONCE
Status: COMPLETED | OUTPATIENT
Start: 2020-09-19 | End: 2020-09-19

## 2020-09-19 RX ORDER — IOPAMIDOL 755 MG/ML
120 INJECTION, SOLUTION INTRAVASCULAR ONCE
Status: COMPLETED | OUTPATIENT
Start: 2020-09-19 | End: 2020-09-19

## 2020-09-19 RX ADMIN — LORAZEPAM 0.5 MG: 2 INJECTION INTRAMUSCULAR; INTRAVENOUS at 08:32

## 2020-09-19 RX ADMIN — SODIUM CHLORIDE 100 ML: 9 INJECTION, SOLUTION INTRAVENOUS at 07:23

## 2020-09-19 RX ADMIN — IOPAMIDOL 120 ML: 755 INJECTION, SOLUTION INTRAVENOUS at 07:23

## 2020-09-19 NOTE — ED NOTES
Patient at CT, tolerated well, patient is confused but pleasant.  Back to the room at 0732, elevated /98.  She recalls a funny feeling in her hand this morning that she said she has never felt before. Able to move all extremities, neuro intact, alert but confused with a baseline dementia.

## 2020-09-19 NOTE — ED PROVIDER NOTES
"  History     Chief Complaint:  Tingling  HPI   Patient's history is limited secondary to her dementia and her difficulty verbalizing her complaints.     Annabel May is a 86 year old female who presents with tingling. EMS reported that the patient woke up this morning with bilateral hand paresthesias and stinging pain. They brought the patient to the ED from the [facility] memory care unit. She states that she has never experienced anything like this before.     Allergies:  Fentanyl  Midazolam  Vecuronium     Medications:    Dulcolax  Eliquis   Levothyroxine   Toprol XL   Remeron   Seroquel   Senexon   Desyrel      Past Medical History:    Thyroid disorder   hypertension   Memory loss   hyperlipidemia   Cardiac dysrhythmia   Dementia   Persistent atrial fibrillation   Malignant neoplasm of breast (H)   Arthritis   Migraine   Hypothyroid     Past Surgical History:    Appendectomy   Bilateral profilactic mastectomy   Cholecystectomy   Partial thyroidectomy   Glaucoma thyroidectomy   Hysterectomy   Head and neck surgery   Phacoemulsification clear cornea with intraocular lens implant-right    Family History:    No past pertinent family history.    Social History:  Presents to the ED: Via EMS  Tobacco use: Never  Alcohol use: Occasional  Drug use: No  PCP: Reese Morfin  Marital Status:   [2]     Review of Systems   Unable to perform ROS: Dementia         Physical Exam     Patient Vitals for the past 24 hrs:   BP Temp Temp src Pulse Resp SpO2 Height   09/19/20 0738 (!) 181/98 -- -- 67 27 100 % --   09/19/20 0655 (!) 180/124 97.8  F (36.6  C) Oral 94 18 99 % 1.727 m (5' 8\")       Physical Exam  Vitals: reviewed by me  General: Pt seen on [facility], pleasant, cooperative, and alert to conversation.  Does seem to be mildly tearful and anxious as well.  Redirectable.  Hands appear to be held in carpal spasm symmetric both sides.  Eyes: Tracking well, clear conjunctiva BL  ENT: MMM, midline trachea.   Lungs:  "  No tachypnea, no accessory muscle use. No respiratory distress.   CV: Rate as above, regular rhythm.    Abd: Soft, non tender, no guarding, no rebound. Non distended  MSK: no peripheral edema or joint effusion.  No evidence of trauma  Skin: No rash, normal turgor and temperature  Neuro: Clear speech and no facial droop.  Bilateral upper and bilateral lower extremities with sensation intact light touch and 5 motor throughout.  Psych: Not RIS, no e/o AH/VH.  Slightly anxious appearing, hyperventilating, but redirectable.      Emergency Department Course   ECG:  Indication: Paresthesias  Time: 0658  Vent. Rate 78 bpm. NE interval 184. QRS duration 150. QT/QTc 462/526. P-R-T axis 87 -83 81.  Atrial-sensed ventricular-paced rhythm. Abnormal ECG. Read time: 0659    Imaging:  Radiographic findings were communicated with the patient who voiced understanding of the findings.    CT Head without contrast:   Chronic changes. No evidence for intracranial hemorrhage   or any acute infarct, as per radiology.     CTA Head Neck with contrast:   1. Attenuated left posterior cerebral artery with several areas of   stenoses. This could be due to atherosclerotic disease or recent   thromboembolism.   2. Thickened thoracic esophagus with possible adjacent or associated   mass posterior to the distal trachea. If clinically indicated, CT of   the chest might be helpful in further evaluation, as per radiology.    CTA Head Perfusion with contrast:   Questionable altered perfusion in left occipital lobe. No   convincing evidence for ischemia or infarct on this exam, as per radiology.    CT Cervical Spine without contrast:   1. Multilevel degenerative disc and facet disease. No evidence for any   significant central canal stenosis.   2. No evidence for syrinx in this study although there is normal   beam-hardening artifact limiting evaluation of the distal cervical   Cord, as per radiology.      Laboratory:  CBC: WBC: 7.3, HGB: 13.9, PLT:  177  BMP: Chloride 110 (H), GFR 59 (L), o/w WNL (Creatinine: 0.89)    INR: 1.11  PTT: 31    Interventions:  0832 Ativan 0.5 mg IV     Emergency Department Course:  Nursing notes and vitals reviewed. (0652) I performed an exam of the patient as documented above.   (0656) Code stroke was initiated.     (0700)  I consulted with Roselia Dove, Stroke Neurology, regarding the patient's history and presentation here in the emergency department.    IV inserted. Medicine administered as documented above. Blood drawn. This was sent to the lab for further testing, results above.    The patient was sent for a head CT, head CTA and head CT perfusion while in the emergency department, findings above.   EKG obtained in the ED, see results above.     (0720) The patient's nurse spoke with the patient's son Kelvin and obtained more of the patient's history.     (0734) I rechecked the patient and discussed the results of her workup thus far.   (0744)  I consulted with Dr. Hardin, Radiology, regarding their interpretation of the patient's imaging studies.     (0745) I spoke with Roselia Dove again.   (0805) I spoke with Roselia again, after she had performed a physical examination of the patient. She was comfortable with the patient being discharged home.     Findings and plan explained to the Patient. Patient discharged home with instructions regarding supportive care, medications, and reasons to return. The importance of close follow-up was reviewed.     I personally reviewed the laboratory results with the Patient and answered all related questions prior to discharge.     Impression & Plan      Medical Decision Making:  Annabel May is a very pleasant 86 year old female who presents to the emergency room with what appears to be bilateral upper extremity tingling, in the setting of a possible panic attack. She initially states that the tingling is not painful, and then endorse a mild amount of pain in both of her hands. Given the  difficulty in ascertaining a history, as she is pleasantly demented, we did pursue a code stroke, and thankfully her code stroke imaging is essentially negative for her symptoms. I do believe clinically, given the degree of hyperventilating and anxiety she was demonstrating on arrival, that she likely did have carpal pedal spasms, and this was mirrored on her extremity exam as well. This would of course explain some of the tingling she was feeling. She was seen briefly  neurology, who confirmed that her symptoms were unlikely to be related to anything found on the CT scan, and from their point of view is stable for outpatient management. I do think that the patient is stable for outpatient management as well, and she feels also much improved she tells me after a low dose of ativan. Lastly I will note that the nurse responsible for Mrs. May spoke with the patient's son, who is himself a physician and who relayed his wish that Mrs. May be transferred back to her care facility if no obvious findings were made, as she does seem to occasionally struggle with certain levels of anxiety and I do believe that that is the main issues and main disturbance we have found today. Will plan for discharge to home with very clear return to ED precautions given.      Diagnosis:    ICD-10-CM    1. Paresthesias  R20.2    2. Paresthesia of both hands  R20.2        Disposition:  discharged to home    Scribe Disclosure:  I, Patti Harman, am serving as a scribe on 9/19/2020 at 6:52 AM to personally document services performed by Jeff Henriquez MD based on my observations and the provider's statements to me.     Patti Harman  9/19/2020    EMERGENCY DEPARTMENT       Jeff Henriquez MD  09/19/20 7366

## 2020-09-19 NOTE — DISCHARGE INSTRUCTIONS
Please come back to emergency room immediately with any worsening symptoms.  No clear cause of your tingling was found today, though it may have had something to do with an anxiety reaction.

## 2020-09-19 NOTE — ED TRIAGE NOTES
Alert and Oriented to self.  Airway patent.  Respirations are regular and unlabored.  Patient talking in full sentences.  Patient denies cough or shortness of breath.    Pulses are strong and regular with palpation.  Skin is normal color, warm and dry.   Cap refill is less than 3 seconds.  Patient denies chest pain/pressure.    Patient awoke this morning with complaints of tingling in bilateral hands. Patient having difficulty further describing her symptoms.

## 2020-09-19 NOTE — ED AVS SNAPSHOT
Emergency Department  [address] [facility] [address]  Phone:  [phone]  Fax:  [phone]                                    Annabel May   MRN: [id number]    Department:   Emergency Department   Date of Visit:  9/19/2020           After Visit Summary Signature Page    I have received my discharge instructions, and my questions have been answered. I have discussed any challenges I see with this plan with the nurse or doctor.    ..........................................................................................................................................  Patient/Patient Representative Signature      ..........................................................................................................................................  Patient Representative Print Name and Relationship to Patient    ..................................................               ................................................  Date                                   Time    ..........................................................................................................................................  Reviewed by Signature/Title    ...................................................              ..............................................  Date                                               Time          22EPIC Rev 08/18

## 2020-09-20 NOTE — CONSULTS
"  [facility]    Stroke Consult Note    Reason for Consult: Stroke Code    Chief Complaint: Tingling (Awoke with tingling sensation in bilateral hands. Difficulty verbalizing any other complaints. Paced rhythm. Transported from [place].)      BRANT  Annabel May is a 86 year old female with history of dementia who presented to the ED for burning pain in both arms. She reportedly was at her baseline at 2100 the evening prior and awoke and was shouting about pain in both of her hands this morning.     Later, her son reportedly states this is something she does from time to time and eventually her symptoms get better on their own and they do not desire any further investigation or treatment.    TPA Treatment   Not given due to unclear or unfavorable risk-benefit profile for extended window thrombolysis beyond the conventional 4.5 hour time window.    Endovascular Treatment  Not initiated due to absence of proximal vessel occlusion    Impression  1. Bilateral forearm pain- not anatomically explained by a cerebral infarction, consider cervical spinal cord process if desired, but would require MRI and not clear if her PPM is compatible.    Recommendations  No further investigation per family's wishes    Patient Follow-up    - None indicated    Thank you for this consult. No further stroke evaluation is recommended, so we will sign off. Please contact us with any additional questions.    The Stroke Staff is Dr. Rondon.    Roselia Dove MD  Vascular Neurology Fellow  To page me or covering stroke neurology team member, click here: [facility]COM   Choose \"On Call\" tab at top, then search dropdown box for \"Neurology Adult\", select location, press Enter, then look for stroke/neuro ICU/telestroke.    ______________________________________________________    Past Medical History   Past Medical History:   Diagnosis Date     Arrhythmia     1AVB, wenckebach     Arthritis      Breast cancer (H)      Hyperlipidaemia      " Hypertension      Hypothyroid      Migraines      PONV (postoperative nausea and vomiting)      Syncope      Past Surgical History   Past Surgical History:   Procedure Laterality Date     APPENDECTOMY       BREAST SURGERY      tee. profilactic mastectomy     CHOLECYSTECTOMY       ENT SURGERY      partial thyroidectomy     GLAUCOMA SURGERY Left 10/16/2017     GYN SURGERY      hystereectomy     HEAD & NECK SURGERY       HYSTERECTOMY       PHACOEMULSIFICATION CLEAR CORNEA W/ STANDARD IOL IMPLANT, ENDOSCOPIC CYCLOPHOTOCOAGULATION, COMBINED  6/28/2012    Procedure: COMBINED PHACOEMULSIFICATION CLEAR CORNEA WITH STANDARD INTRAOCULAR LENS IMPLANT, ENDOSCOPIC CYCLOPHOTOCOAGULATION;  RIGHT COMBINED PHACOEMULSIFICATION CLEAR CORNEA WITH STANDARD INTRAOCULAR LENS IMPLANT, ENDOSCOPIC CYCLOPHOTOCOAGULATION ;  Surgeon: Vinay Duque MD;  Location: [facility]     Medications   Home Meds  Prior to Admission medications    Medication Sig Start Date End Date Taking? Authorizing Provider   ACETAMINOPHEN EXTRA STRENGTH 500 MG tablet TAKE TWO TABLETS (1000MG) BY MOUTH TWICE DAILY;AND MAY TAKE ONE TABLET (500MG) BY MOUTH TWICE DAILY AS NEEDED FOR MILD PAIN 6/12/20   Reese Morfin APRN CNP   AZOPT 1 % ophthalmic suspension INSTILL ONE DROP IN EACH EYE TWICE DAILY 4/29/20   Reese Morfin APRN CNP   bisacodyl (DULCOLAX) 10 MG suppository Place 1 suppository (10 mg) rectally daily as needed for constipation Give if no record bowel movement x 3 days 1/18/20   Reese Morfin APRN CNP   ELIQUIS ANTICOAGULANT 2.5 MG tablet TAKE 1 TABLET BY MOUTH TWICE DAILY 2/14/20   Adriana Guerrero APRN CNP   latanoprost (XALATAN) 0.005 % ophthalmic solution INSTILL ONE DROP IN EACH EYE AT BEDTIME 6/29/20   Reese Morfin APRN CNP   levothyroxine (SYNTHROID/LEVOTHROID) 75 MCG tablet TAKE 1 TABLET BY MOUTH ONCE DAILY 3/23/20   Adriana Guerrero APRN CNP   MELATONIN MAXIMUM STRENGTH 5 MG tablet TAKE 1 TABLET BY MOUTH AT BEDTIME 2/12/20    Reese Morfin APRN CNP   metoprolol succinate ER (TOPROL-XL) 25 MG 24 hr tablet TAKE 1 TABLET BY MOUTH ONCE DAILY 3/23/20   Adriana Guerrero APRN CNP   mirtazapine (REMERON) 15 MG tablet Take 1 tablet by mouth At Bedtime 6/23/20   Reported, Patient   Multiple Vitamins-Minerals (PRESERVISION AREDS) CAPS TAKE 1 CAPSULE BY MOUTH TWICE DAILY 2/14/20   Adriana Guerrero APRN CNP   polyethylene glycol (MIRALAX) 17 g packet Take 17 g by mouth daily And give 2nd dose (BID) on Mon/Wed/Friday. Hold for loose stools 8/12/20   Reese Morfin APRN CNP   QUEtiapine (SEROQUEL) 25 MG tablet Take 1 tablet (25 mg) by mouth 3 times daily 8/12/20   Reese Morfin APRN CNP   QUEtiapine (SEROQUEL) 25 MG tablet Take 1 tablet (25 mg) by mouth 2 times daily as needed (agitation) 1/14/20   Song Blancas MD   SENEXON-S 8.6-50 MG tablet TAKE TWO TABLETS BY MOUTH TWICE DAILY HOLD FOR LOOSE STOOL 5/21/20   Reese Morfin APRN CNP   traZODone (DESYREL) 50 MG tablet Take 1 tablet (50 mg) by mouth At Bedtime 8/27/20   Reese Morfin APRN CNP   vitamin B-12 (CYANOCOBALAMIN) 100 MCG tablet TAKE 1 TABLET BY MOUTH ONCE DAILY 2/12/20   Reese Morfin APRN CNP       Scheduled Meds      Infusion Meds      PRN Meds      Allergies   Allergies   Allergen Reactions     Midazolam Unknown     Vecuronium Unknown     Son reports reaction was severe - specific reaction unknown     Family History   Family History   Problem Relation Age of Onset     Glaucoma No family hx of      Macular Degeneration No family hx of      Social History   Social History     Tobacco Use     Smoking status: Never Smoker     Smokeless tobacco: Never Used   Substance Use Topics     Alcohol use: Yes     Comment: occational     Drug use: No       Review of Systems   Review of systems not obtained due to patient factors - confusion       PHYSICAL EXAMINATION  Temp:  [97.8  F (36.6  C)] 97.8  F (36.6  C)  Pulse:  [66-94] 69  Resp:  [14-27] 14  BP:  (137-181)/() 137/90  SpO2:  [98 %-100 %] 98 %     Neurologic  Mental Status:  awake, alert, inattentive, oriented to self only, language is fluent but incoherent, does not participate in language testing  Cranial Nerves:  VFF, PERRL, EOMI, face symmetric, sensation intact, tongue midline, no dysarthria  Motor:  no abnormal movements, able to move all limbs antigravity spontaneously with no signs of hemiparesis observed, no pronator drift  Reflexes:  unable to test (telestroke)  Sensory:  intact to light touch in 4/4 extremities  Coordination:  does not participate in ataxia testing  Station/Gait:  not tested      Dysphagia Screen  Per Nursing    Stroke Scales    NIHSS  Interval baseline (09/19/20 0900)   Interval Comments     1a. Level of Consciousness 0-->Alert, keenly responsive   1b. LOC Questions 2-->Answers neither question correctly   1c. LOC Commands 0-->Performs both tasks correctly   2.   Best Gaze 0-->Normal   3.   Visual 0-->No visual loss   4.   Facial Palsy 0-->Normal symmetrical movements   5a. Motor Arm, Left 0-->No drift, limb holds 90 (or 45) degrees for full 10 secs   5b. Motor Arm, Right 0-->No drift, limb holds 90 (or 45) degrees for full 10 secs   6a. Motor Leg, Left 2-->Some effort against gravity, leg falls to bed by 5 secs, but has some effort against gravity   6b. Motor Leg, right 2-->Some effort against gravity, leg falls to bed by 5 secs, but has some effort against gravity   7.   Limb Ataxia 0-->Absent   8.   Sensory 0-->Normal, no sensory loss   9.   Best Language 0-->No aphasia, normal   10. Dysarthria 0-->Normal   11. Extinction and Inattention  0-->No abnormality   Total 6 (09/19/20 0900)       Imaging  I personally reviewed all imaging; relevant findings per HPI.     Lab Results Data   CBC  Recent Labs   Lab 09/19/20  0704   WBC 7.3   RBC 4.16   HGB 13.9   HCT 42.5        Basic Metabolic Panel    Recent Labs   Lab 09/19/20  0704      POTASSIUM 3.8   CHLORIDE 110*    CO2 24   BUN 14   CR 0.89   GLC 85   AMERICO 9.3     Liver Panel  No results for input(s): PROTTOTAL, ALBUMIN, BILITOTAL, ALKPHOS, AST, ALT, BILIDIRECT in the last 168 hours.  INR    Recent Labs   Lab Test 09/19/20  0704 08/03/12  0847   INR 1.11 1.09      Lipid Profile    Recent Labs   Lab Test 07/10/14   CHOL 226*   HDL 66      TRIG 88   CHOLHDLRATIO 3.42     A1C  No lab results found.  Troponin I  No results for input(s): TROPI in the last 168 hours.       Stroke Code / Stroke Consult Data Data   Stroke Code Data  (for stroke code without tele)  Stroke code activated 09/19/20   0657   First stroke provider response 09/19/20   0800   Last known normal 09/18/20   2100   Time of discovery   (or onset of symptoms) 09/19/20   0600   Head CT read by me 09/19/20   0729   Was stroke code de-escalated? Yes 09/19/20 0747  symptoms not likely caused by stroke

## 2020-09-23 ENCOUNTER — ASSISTED LIVING VISIT (OUTPATIENT)
Dept: GERIATRICS | Facility: CLINIC | Age: 85
End: 2020-09-23
Payer: MEDICARE

## 2020-09-23 VITALS
BODY MASS INDEX: 19.72 KG/M2 | HEART RATE: 96 BPM | WEIGHT: 122.7 LBS | TEMPERATURE: 98 F | SYSTOLIC BLOOD PRESSURE: 95 MMHG | RESPIRATION RATE: 18 BRPM | OXYGEN SATURATION: 98 % | DIASTOLIC BLOOD PRESSURE: 60 MMHG | HEIGHT: 66 IN

## 2020-09-23 DIAGNOSIS — F41.9 ANXIETY: ICD-10-CM

## 2020-09-23 DIAGNOSIS — F33.1 MODERATE EPISODE OF RECURRENT MAJOR DEPRESSIVE DISORDER (H): ICD-10-CM

## 2020-09-23 DIAGNOSIS — R45.1 AGITATION: ICD-10-CM

## 2020-09-23 DIAGNOSIS — F03.91 DEMENTIA WITH BEHAVIORAL DISTURBANCE, UNSPECIFIED DEMENTIA TYPE: Primary | ICD-10-CM

## 2020-09-23 PROBLEM — Z80.3 FAMILY HISTORY OF MALIGNANT NEOPLASM OF BREAST: Status: ACTIVE | Noted: 2020-09-23

## 2020-09-23 PROBLEM — N18.30 CKD (CHRONIC KIDNEY DISEASE) STAGE 3, GFR 30-59 ML/MIN (H): Status: ACTIVE | Noted: 2020-09-23

## 2020-09-23 PROBLEM — Z85.3 HISTORY OF BREAST CANCER: Status: ACTIVE | Noted: 2020-09-23

## 2020-09-23 RX ORDER — SERTRALINE HYDROCHLORIDE 25 MG/1
25 TABLET, FILM COATED ORAL DAILY
Qty: 30 TABLET | Refills: 1 | Status: SHIPPED | OUTPATIENT
Start: 2020-09-23 | End: 2020-10-06

## 2020-09-23 NOTE — PROGRESS NOTES
[facility]  [facility] Record Number: [id number]  Place of Service where encounter took place: [facility] [[id number]]  Chief Complaint   Patient presents with     Hospital F/U     [facility] - ED visit 9/19/20       HPI:    Annabel May is a 86 year old (5/12/1934), who is being seen today for an episodic care visit. HPI information obtained from: facility chart records, facility staff and [facility] chart review. Today's concern is:    Follow up from ED admission for chest pain/bilateral hand paresthesias in the setting of possible panic attack. She has baseline anxiety (long-standing) not well controlled. Has used 8 prn doses of Seroquel this month. On unit today, wanders with worried look on face. Is teary at times asking about her son and grandchildren. Denies pain, chest pain or SOB. Staff reporting she is sleeping well. Weight stable, VSS. Does have constipation that has exacerbated anxiety in the past. Last recorded bowel movement was yesterday.  BP lower today with elevated HR so could be dehydrated?      Past Medical and Surgical History reviewed in Epic today.    MEDICATIONS:    Current Outpatient Medications   Medication Sig Dispense Refill     sertraline (ZOLOFT) 25 MG tablet Take 1 tablet (25 mg) by mouth daily 30 tablet 1     ACETAMINOPHEN EXTRA STRENGTH 500 MG tablet TAKE TWO TABLETS (1000MG) BY MOUTH TWICE DAILY;AND MAY TAKE ONE TABLET (500MG) BY MOUTH TWICE DAILY AS NEEDED FOR MILD PAIN 112 tablet 97     AZOPT 1 % ophthalmic suspension INSTILL ONE DROP IN EACH EYE TWICE DAILY 10 mL 97     bisacodyl (DULCOLAX) 10 MG suppository Place 1 suppository (10 mg) rectally daily as needed for constipation Give if no record bowel movement x 3 days 10 suppository 11     ELIQUIS ANTICOAGULANT 2.5 MG tablet TAKE 1 TABLET BY MOUTH TWICE DAILY 80 tablet 11     latanoprost (XALATAN) 0.005 % ophthalmic solution INSTILL ONE DROP IN EACH EYE AT  "BEDTIME 2.5 mL 97     levothyroxine (SYNTHROID/LEVOTHROID) 75 MCG tablet TAKE 1 TABLET BY MOUTH ONCE DAILY 28 tablet PRN     MELATONIN MAXIMUM STRENGTH 5 MG tablet TAKE 1 TABLET BY MOUTH AT BEDTIME 37 tablet 97     metoprolol succinate ER (TOPROL-XL) 25 MG 24 hr tablet TAKE 1 TABLET BY MOUTH ONCE DAILY 28 tablet PRN     mirtazapine (REMERON) 15 MG tablet Take 1 tablet by mouth At Bedtime       Multiple Vitamins-Minerals (PRESERVISION AREDS) CAPS TAKE 1 CAPSULE BY MOUTH TWICE DAILY 60 capsule 11     polyethylene glycol (MIRALAX) 17 g packet Take 17 g by mouth daily And give 2nd dose (BID) on Mon/Wed/Friday. Hold for loose stools 17 g 11     QUEtiapine (SEROQUEL) 25 MG tablet Take 1 tablet (25 mg) by mouth 3 times daily 84 tablet 97     QUEtiapine (SEROQUEL) 25 MG tablet Take 1 tablet (25 mg) by mouth 2 times daily as needed (agitation) 30 tablet 0     SENEXON-S 8.6-50 MG tablet TAKE TWO TABLETS BY MOUTH TWICE DAILY HOLD FOR LOOSE STOOL 124 tablet PRN     traZODone (DESYREL) 50 MG tablet Take 1 tablet (50 mg) by mouth At Bedtime 30 tablet 3     vitamin B-12 (CYANOCOBALAMIN) 100 MCG tablet TAKE 1 TABLET BY MOUTH ONCE DAILY 37 tablet 97     REVIEW OF SYSTEMS:  Limited secondary to cognitive impairment but today pt reports ok    Objective:  BP 95/60   Pulse 96   Temp 98  F (36.7  C)   Resp 18   Ht 1.676 m (5' 6\")   Wt 55.7 kg (122 lb 11.2 oz)   SpO2 98%   BMI 19.80 kg/m    Exam:  GENERAL APPEARANCE:  Alert, in no distress but anxious, teary at times   ENT:  Mouth and posterior oropharynx normal, dry mucous membranes, normal hearing acuity  EYES:  EOM, conjunctivae, lids, pupils and irises normal  RESP:  respiratory effort and palpation of chest normal, lungs clear to auscultation   CV:  Palpation and auscultation of heart done , regular rate and rhythm, no edema  ABDOMEN: hypoactive bowel sounds, some distention and firm is baseline   M/S:   Gait and station at baseline w/o assistive device   SKIN:  Inspection of " skin and subcutaneous tissue baseline to visualized areas. Cyst/nodule hard on index finger right hand  NEURO:   Cranial nerves 2-12 are normal tested and grossly at patient's baseline  PSYCH:  insight and judgement impaired, memory impaired    Labs:   CBC RESULTS:   Recent Labs   Lab Test 09/19/20  0704 02/28/20  0941   WBC 7.3 8.6   RBC 4.16 4.15   HGB 13.9 13.3   HCT 42.5 40.1   * 97   MCH 33.4* 32.0   MCHC 32.7 33.2   RDW 14.3 14.2    170       Last Basic Metabolic Panel:  Recent Labs   Lab Test 09/19/20  0704 02/28/20  0941    136   POTASSIUM 3.8 4.8   CHLORIDE 110* 109   AMERICO 9.3 9.3   CO2 24 23   BUN 14 22   CR 0.89 0.95   GLC 85 78       Liver Function Studies -   Recent Labs   Lab Test 01/06/20  1530 07/10/14 08/03/12  0847   PROTTOTAL 7.7 6.9 6.6*   ALBUMIN 4.4  --  4.0   BILITOTAL 0.9 0.6 0.7   ALKPHOS 66 77 66   AST 31 19 21   ALT 29 10 19       TSH   Date Value Ref Range Status   07/10/2014 0.98 0.35 - 4.94 mcU/mL Final   08/03/2012 6.61 (H) 0.4 - 5.0 mU/L Final     TSH on 10/2/2019 2.21    No results found for: A1C    Imaging from ED 9/19/20:  Radiographic findings were communicated with the patient who voiced understanding of the findings.     CT Head without contrast:   Chronic changes. No evidence for intracranial hemorrhage   or any acute infarct, as per radiology.      CTA Head Neck with contrast:   1. Attenuated left posterior cerebral artery with several areas of   stenoses. This could be due to atherosclerotic disease or recent   thromboembolism.   2. Thickened thoracic esophagus with possible adjacent or associated   mass posterior to the distal trachea. If clinically indicated, CT of   the chest might be helpful in further evaluation, as per radiology.     CTA Head Perfusion with contrast:   Questionable altered perfusion in left occipital lobe. No   convincing evidence for ischemia or infarct on this exam, as per radiology.     CT Cervical Spine without contrast:   1.  Multilevel degenerative disc and facet disease. No evidence for any   significant central canal stenosis.   2. No evidence for syrinx in this study although there is normal   beam-hardening artifact limiting evaluation of the distal cervical   Cord, as per radiology.    ASSESSMENT/PLAN:  (F03.91) Dementia with behavioral disturbance, unspecified dementia type (H)  (F33.1)Moderate episode of recurrent major depressive disorder (H)  (R45.1) Agitation  (F41.9) Anxiety  Comment: flares with behaviors at times   Plan:   -melatonin and trazodone at HS for insomnia in addition to mirtazapine   -Seroquel 25 mg po TID and twice daily prn   -starting Sertraline 25 mg po daily (has used in the past)  -left message for her son to review imaging and plan of care  -encourage oral intake of fluids                 Electronically signed by:  ZAFAR Bae CNP

## 2020-09-23 NOTE — LETTER
9/23/2020        RE: Annabel Calixto  [address]  [place] [address]        [facility] [facility]  [facility] Record Number: [id number]  Place of Service where encounter took place: [facility] [[id number]]  Chief Complaint   Patient presents with     Hospital F/U     [facility] - ED visit 9/19/20       HPI:    Annabel May is a 86 year old (5/12/1934), who is being seen today for an episodic care visit. HPI information obtained from: facility chart records, facility staff and [facility] chart review. Today's concern is:    Follow up from ED admission for chest pain/bilateral hand paresthesias in the setting of possible panic attack. She has baseline anxiety (long-standing) not well controlled. Has used 8 prn doses of Seroquel this month. On unit today, wanders with worried look on face. Is teary at times asking about her son and grandchildren. Denies pain, chest pain or SOB. Staff reporting she is sleeping well. Weight stable, VSS. Does have constipation that has exacerbated anxiety in the past. Last recorded bowel movement was yesterday.  BP lower today with elevated HR so could be dehydrated?      Past Medical and Surgical History reviewed in Epic today.    MEDICATIONS:    Current Outpatient Medications   Medication Sig Dispense Refill     sertraline (ZOLOFT) 25 MG tablet Take 1 tablet (25 mg) by mouth daily 30 tablet 1     ACETAMINOPHEN EXTRA STRENGTH 500 MG tablet TAKE TWO TABLETS (1000MG) BY MOUTH TWICE DAILY;AND MAY TAKE ONE TABLET (500MG) BY MOUTH TWICE DAILY AS NEEDED FOR MILD PAIN 112 tablet 97     AZOPT 1 % ophthalmic suspension INSTILL ONE DROP IN EACH EYE TWICE DAILY 10 mL 97     bisacodyl (DULCOLAX) 10 MG suppository Place 1 suppository (10 mg) rectally daily as needed for constipation Give if no record bowel movement x 3 days 10 suppository 11     ELIQUIS ANTICOAGULANT 2.5 MG tablet TAKE 1 TABLET BY MOUTH TWICE DAILY  "80 tablet 11     latanoprost (XALATAN) 0.005 % ophthalmic solution INSTILL ONE DROP IN EACH EYE AT BEDTIME 2.5 mL 97     levothyroxine (SYNTHROID/LEVOTHROID) 75 MCG tablet TAKE 1 TABLET BY MOUTH ONCE DAILY 28 tablet PRN     MELATONIN MAXIMUM STRENGTH 5 MG tablet TAKE 1 TABLET BY MOUTH AT BEDTIME 37 tablet 97     metoprolol succinate ER (TOPROL-XL) 25 MG 24 hr tablet TAKE 1 TABLET BY MOUTH ONCE DAILY 28 tablet PRN     mirtazapine (REMERON) 15 MG tablet Take 1 tablet by mouth At Bedtime       Multiple Vitamins-Minerals (PRESERVISION AREDS) CAPS TAKE 1 CAPSULE BY MOUTH TWICE DAILY 60 capsule 11     polyethylene glycol (MIRALAX) 17 g packet Take 17 g by mouth daily And give 2nd dose (BID) on Mon/Wed/Friday. Hold for loose stools 17 g 11     QUEtiapine (SEROQUEL) 25 MG tablet Take 1 tablet (25 mg) by mouth 3 times daily 84 tablet 97     QUEtiapine (SEROQUEL) 25 MG tablet Take 1 tablet (25 mg) by mouth 2 times daily as needed (agitation) 30 tablet 0     SENEXON-S 8.6-50 MG tablet TAKE TWO TABLETS BY MOUTH TWICE DAILY HOLD FOR LOOSE STOOL 124 tablet PRN     traZODone (DESYREL) 50 MG tablet Take 1 tablet (50 mg) by mouth At Bedtime 30 tablet 3     vitamin B-12 (CYANOCOBALAMIN) 100 MCG tablet TAKE 1 TABLET BY MOUTH ONCE DAILY 37 tablet 97     REVIEW OF SYSTEMS:  Limited secondary to cognitive impairment but today pt reports ok    Objective:  BP 95/60   Pulse 96   Temp 98  F (36.7  C)   Resp 18   Ht 1.676 m (5' 6\")   Wt 55.7 kg (122 lb 11.2 oz)   SpO2 98%   BMI 19.80 kg/m    Exam:  GENERAL APPEARANCE:  Alert, in no distress but anxious, teary at times   ENT:  Mouth and posterior oropharynx normal, dry mucous membranes, normal hearing acuity  EYES:  EOM, conjunctivae, lids, pupils and irises normal  RESP:  respiratory effort and palpation of chest normal, lungs clear to auscultation   CV:  Palpation and auscultation of heart done , regular rate and rhythm, no edema  ABDOMEN: hypoactive bowel sounds, some distention and " firm is baseline   M/S:   Gait and station at baseline w/o assistive device   SKIN:  Inspection of skin and subcutaneous tissue baseline to visualized areas. Cyst/nodule hard on index finger right hand  NEURO:   Cranial nerves 2-12 are normal tested and grossly at patient's baseline  PSYCH:  insight and judgement impaired, memory impaired    Labs:   CBC RESULTS:   Recent Labs   Lab Test 09/19/20  0704 02/28/20  0941   WBC 7.3 8.6   RBC 4.16 4.15   HGB 13.9 13.3   HCT 42.5 40.1   * 97   MCH 33.4* 32.0   MCHC 32.7 33.2   RDW 14.3 14.2    170       Last Basic Metabolic Panel:  Recent Labs   Lab Test 09/19/20  0704 02/28/20  0941    136   POTASSIUM 3.8 4.8   CHLORIDE 110* 109   AMERICO 9.3 9.3   CO2 24 23   BUN 14 22   CR 0.89 0.95   GLC 85 78       Liver Function Studies -   Recent Labs   Lab Test 01/06/20  1530 07/10/14 08/03/12  0847   PROTTOTAL 7.7 6.9 6.6*   ALBUMIN 4.4  --  4.0   BILITOTAL 0.9 0.6 0.7   ALKPHOS 66 77 66   AST 31 19 21   ALT 29 10 19       TSH   Date Value Ref Range Status   07/10/2014 0.98 0.35 - 4.94 mcU/mL Final   08/03/2012 6.61 (H) 0.4 - 5.0 mU/L Final     TSH on 10/2/2019 2.21    No results found for: A1C    Imaging from ED 9/19/20:  Radiographic findings were communicated with the patient who voiced understanding of the findings.     CT Head without contrast:   Chronic changes. No evidence for intracranial hemorrhage   or any acute infarct, as per radiology.      CTA Head Neck with contrast:   1. Attenuated left posterior cerebral artery with several areas of   stenoses. This could be due to atherosclerotic disease or recent   thromboembolism.   2. Thickened thoracic esophagus with possible adjacent or associated   mass posterior to the distal trachea. If clinically indicated, CT of   the chest might be helpful in further evaluation, as per radiology.     CTA Head Perfusion with contrast:   Questionable altered perfusion in left occipital lobe. No   convincing evidence for  ischemia or infarct on this exam, as per radiology.     CT Cervical Spine without contrast:   1. Multilevel degenerative disc and facet disease. No evidence for any   significant central canal stenosis.   2. No evidence for syrinx in this study although there is normal   beam-hardening artifact limiting evaluation of the distal cervical   Cord, as per radiology.    ASSESSMENT/PLAN:  (F03.91) Dementia with behavioral disturbance, unspecified dementia type (H)  (F33.1)Moderate episode of recurrent major depressive disorder (H)  (R45.1) Agitation  (F41.9) Anxiety  Comment: flares with behaviors at times   Plan:   -melatonin and trazodone at HS for insomnia in addition to mirtazapine   -Seroquel 25 mg po TID and twice daily prn   -starting Sertraline 25 mg po daily (has used in the past)  -left message for her son to review imaging and plan of care  -encourage oral intake of fluids                 Electronically signed by:  ZAFAR Bae CNP         Sincerely,        ZAFAR Bae CNP

## 2020-10-05 DIAGNOSIS — F41.9 ANXIETY: ICD-10-CM

## 2020-10-06 RX ORDER — SERTRALINE HYDROCHLORIDE 25 MG/1
TABLET, FILM COATED ORAL
Qty: 28 TABLET | Status: SHIPPED | OUTPATIENT
Start: 2020-10-06 | End: 2020-11-18

## 2020-10-08 ENCOUNTER — HOSPITAL LABORATORY (OUTPATIENT)
Dept: OTHER | Facility: CLINIC | Age: 85
End: 2020-10-08

## 2020-10-09 LAB
SARS-COV-2 RNA SPEC QL NAA+PROBE: NOT DETECTED
SPECIMEN SOURCE: NORMAL

## 2020-10-14 ENCOUNTER — TELEPHONE (OUTPATIENT)
Dept: PHARMACY | Facility: CLINIC | Age: 85
End: 2020-10-14

## 2020-10-14 NOTE — TELEPHONE ENCOUNTER
Have had a tough time getting a hold of nursing staff for a follow-up MTM visit as well as pt's son.  I am unable to see patient in facility due to COVID restrictions.  Will follow-up in future at provider or family request.    Marci Taylor, Pharm.D.,[facility] [place]  Board Certified Geriatric Pharmacist  Medication Therapy Management Pharmacist  [phone]

## 2020-10-15 ENCOUNTER — HOSPITAL LABORATORY (OUTPATIENT)
Dept: OTHER | Facility: CLINIC | Age: 85
End: 2020-10-15

## 2020-10-16 LAB
SARS-COV-2 RNA SPEC QL NAA+PROBE: NOT DETECTED
SPECIMEN SOURCE: NORMAL

## 2020-11-06 ENCOUNTER — HOSPITAL LABORATORY (OUTPATIENT)
Dept: OTHER | Facility: CLINIC | Age: 85
End: 2020-11-06

## 2020-11-07 LAB
SARS-COV-2 RNA SPEC QL NAA+PROBE: NOT DETECTED
SPECIMEN SOURCE: NORMAL

## 2020-11-13 ENCOUNTER — HOSPITAL LABORATORY (OUTPATIENT)
Dept: OTHER | Facility: CLINIC | Age: 85
End: 2020-11-13

## 2020-11-14 LAB
SARS-COV-2 RNA SPEC QL NAA+PROBE: NOT DETECTED
SPECIMEN SOURCE: NORMAL

## 2020-11-16 NOTE — PROGRESS NOTES
"[facility]  Type of service: Video Visit  Annabel May is being evaluated via a billable visit.   The patient or first contact has been notified of following:  \"This video visit will be conducted via a call between you and your provider. We have found that certain health care needs can be provided without the need for an in-person physical exam.  This service lets us provide the care you need with a video conversation. If during the course of the call the provider feels a video visit is not appropriate, you will not be charged for this service.\"   The provider has received verbal consent for a Video or Telephone Visit from the patient and or first contact? Yes  Video or Telephone Start Time: 10:08 AM  [facility] Record Number: [id number]  Where the Patient is living now: [facility]  Chief Complaint   Patient presents with     Video Visit     Annual Comprehensive Exam Assisted Living     HPI:    Annabel May is a 86 year old (5/12/1934), who is being seen today for an annual comprehensive visit. HPI information obtained from: facility chart records, facility staff, patient report and [facility] chart review. Today's concerns are:    Seen today for follow up to ongoing conditions. In community room and gets teary talking about missing her family. \"I want to see my son\". She denies pain, SOB, abdominal pain. Staff with no acute concerns. Up and ambulatory w/o assistive device.     ALLERGIES: Midazolam and Vecuronium  PAST MEDICAL HISTORY:  has a past medical history of Arrhythmia, Arthritis, Breast cancer (H), Hyperlipidaemia, Hypertension, Hypothyroid, Migraines, PONV (postoperative nausea and vomiting), and Syncope. She also has no past medical history of Malignant neoplasm (H).  PAST SURGICAL HISTORY:  has a past surgical history that includes Breast surgery; Head and neck surgery; ENT surgery; Hysterectomy; GYN surgery; appendectomy; " Cholecystectomy; Phacoemulsification clear cornea with standard intraocular lens implant, endoscopic cyclophotocoagulation, combined (6/28/2012); and Glaucoma surgery (Left, 10/16/2017).  IMMUNIZATIONS:  Immunization History   Administered Date(s) Administered     Flu, Unspecified 10/23/2003, 10/20/2004, 10/17/2005, 10/23/2006, 09/24/2010     L4t8-03 Novel Flu 12/15/2009     Influenza (H1N1) 12/15/2009     Influenza (High Dose) 3 valent vaccine 09/01/2015, 10/17/2015, 09/29/2016, 10/05/2017, 10/04/2018, 10/02/2019     Influenza (IIV3) PF 12/06/2000, 10/26/2001, 10/28/2002, 10/23/2003, 10/20/2004, 10/17/2005, 10/23/2006, 10/04/2008, 09/24/2010, 09/11/2012, 10/18/2013, 10/05/2017, 10/04/2018     Influenza, Quad, High Dose, Pf, 65yr + 09/23/2020     Pneumo Conj 13-V (2010&after) 08/02/2016     Pneumococcal 23 valent 05/21/2004, 07/10/2014     TDAP Vaccine (Adacel) 01/22/2020     Td (Adult), Adsorbed 05/16/2008     Zoster vaccine recombinant adjuvanted (SHINGRIX) 12/13/2018, 02/28/2019     Zoster vaccine, live 07/30/2007     Above immunizations pulled from [facility]. [facility] and facility records also reconciled. Outstanding information sent to  to update [facility].  Future immunizations are not needed at this point as all recommended immunizations are up to date.     Current Outpatient Medications   Medication Sig Dispense Refill     ACETAMINOPHEN EXTRA STRENGTH 500 MG tablet TAKE TWO TABLETS (1000MG) BY MOUTH TWICE DAILY;AND MAY TAKE ONE TABLET (500MG) BY MOUTH TWICE DAILY AS NEEDED FOR MILD PAIN 112 tablet 97     AZOPT 1 % ophthalmic suspension INSTILL ONE DROP IN EACH EYE TWICE DAILY 10 mL 97     bisacodyl (DULCOLAX) 10 MG suppository Place 1 suppository (10 mg) rectally daily as needed for constipation Give if no record bowel movement x 3 days 10 suppository 11     ELIQUIS ANTICOAGULANT 2.5 MG tablet TAKE 1 TABLET BY MOUTH TWICE DAILY 80 tablet 11     latanoprost (XALATAN) 0.005 % ophthalmic  solution INSTILL ONE DROP IN EACH EYE AT BEDTIME 2.5 mL 97     levothyroxine (SYNTHROID/LEVOTHROID) 75 MCG tablet TAKE 1 TABLET BY MOUTH ONCE DAILY 28 tablet PRN     MELATONIN MAXIMUM STRENGTH 5 MG tablet TAKE 1 TABLET BY MOUTH AT BEDTIME 37 tablet 97     metoprolol succinate ER (TOPROL-XL) 25 MG 24 hr tablet TAKE 1 TABLET BY MOUTH ONCE DAILY 28 tablet PRN     mirtazapine (REMERON) 15 MG tablet Take 1 tablet by mouth At Bedtime       Multiple Vitamins-Minerals (PRESERVISION AREDS) CAPS TAKE 1 CAPSULE BY MOUTH TWICE DAILY 60 capsule 11     polyethylene glycol (MIRALAX) 17 g packet Take 17 g by mouth daily And give 2nd dose (BID) on Mon/Wed/Friday. Hold for loose stools 17 g 11     QUEtiapine (SEROQUEL) 25 MG tablet Take 1 tablet (25 mg) by mouth 3 times daily 84 tablet 97     QUEtiapine (SEROQUEL) 25 MG tablet Take 1 tablet (25 mg) by mouth 2 times daily as needed (agitation) 30 tablet 0     SENEXON-S 8.6-50 MG tablet TAKE TWO TABLETS BY MOUTH TWICE DAILY HOLD FOR LOOSE STOOL 124 tablet PRN     sertraline (ZOLOFT) 25 MG tablet TAKE 1 TABLET BY MOUTH ONCE DAILY 28 tablet PRN     traZODone (DESYREL) 50 MG tablet Take 1 tablet (50 mg) by mouth At Bedtime 30 tablet 3     vitamin B-12 (CYANOCOBALAMIN) 100 MCG tablet TAKE 1 TABLET BY MOUTH ONCE DAILY 37 tablet 97     Case Management:  I have reviewed the Assisted Living care plan, current immunizations and preventive care/cancer screening. .Future cancer screening is not clinically indicated secondary to age/goals of care Patient's desire to return to the community is present, but is not able due to care needs . Current Level of Care is appropriate.    Advance Directive Discussion:    I reviewed the current advanced directives as reflected in EPIC, the POLST and the facility chart, and verified the congruency of orders. I contacted the first party son Kelvin and discussed the plan of Care. I did not due to cognitive impairment review the advance directives with the  "resident.     Team Discussion:  I communicated with the appropriate disciplines involved with the Plan of Care: Nursing    Patient's goal is pain control and comfort. Treat reversible conditions   Information reviewed:  Medications, vital signs, orders, and nursing notes.    ROS:  Limited secondary to cognitive impairment but today pt reports ok    Vitals:  /80   Pulse 90   Temp 97.2  F (36.2  C)   Resp 17   Ht 1.727 m (5' 8\")   Wt 55.7 kg (122 lb 12.8 oz)   SpO2 99%   BMI 18.67 kg/m   Body mass index is 18.67 kg/m .  Exam:  GENERAL APPEARANCE:  Alert, in no distress but anxious, teary at times   ENT:  Mouth and posterior oropharynx normal, dry mucous membranes, normal hearing acuity  EYES:  EOM, conjunctivae, lids, pupils and irises normal-glasses on   RESP:  respiratory effort with no labored breathing  CV:  Palpation and auscultation of heart done , regular rate and rhythm, no edema  ABDOMEN: thin with baseline abdominal distension   M/S:   Gait and station at baseline w/o assistive device   SKIN:  Inspection of skin and subcutaneous tissue baseline to visualized areas. Cyst/nodule hard on index finger right hand   NEURO:   Cranial nerves 2-12 are normal tested and grossly at patient's baseline  PSYCH:  insight and judgement impaired, memory impaired    Lab/Diagnostic data:   CBC RESULTS:   Recent Labs   Lab Test 09/19/20  0704 02/28/20  0941   WBC 7.3 8.6   RBC 4.16 4.15   HGB 13.9 13.3   HCT 42.5 40.1   * 97   MCH 33.4* 32.0   MCHC 32.7 33.2   RDW 14.3 14.2    170       Last Basic Metabolic Panel:  Recent Labs   Lab Test 09/19/20  0704 02/28/20  0941    136   POTASSIUM 3.8 4.8   CHLORIDE 110* 109   AMERICO 9.3 9.3   CO2 24 23   BUN 14 22   CR 0.89 0.95   GLC 85 78       Liver Function Studies -   Recent Labs   Lab Test 01/06/20  1530 07/10/14   PROTTOTAL 7.7 6.9   ALBUMIN 4.4  --    BILITOTAL 0.9 0.6   ALKPHOS 66 77   AST 31 19   ALT 29 10       TSH   Date Value Ref Range Status "   07/10/2014 0.98 0.35 - 4.94 mcU/mL Final   08/03/2012 6.61 (H) 0.4 - 5.0 mU/L Final     TSH 2.21 on 10/2/2019     No results found for: A1C    ASSESSMENT/PLAN  (F03.91) Dementia with behavioral disturbance, unspecified dementia type (H)  (primary encounter diagnosis)  (F41.9) Anxiety  (R45.1) Agitation  (G47.00) Insomnia, unspecified type  Comment: flares at times and frequently teary   Plan:   -melatonin and trazodone at HS for insomnia in addition to mirtazapine   -Seroquel 25 mg po TID and twice daily prn (prn used 5x this month)  -starting Sertraline 25 mg po daily (has used in the past) will increase to 50 mg po daily   -left message for her son to review imaging and plan of care  -encourage oral intake of fluids     (E46) Protein-calorie malnutrition, unspecified severity (H)  Comment: weight stable. Thickened thoracic esophagus with possible adjacent or associated mass posterior to the distal trachea. If clinically indicated, CT of the chest might be helpful in further evaluation, as per radiology.  Plan:   -encourage oral intake of fluids and food  -facility to follow weights   -discussed imaging with her son who is a radiologist    (N18.31) Stage 3a chronic kidney disease  Comment: chronic  Plan:   -encourage oral intake of fluids, renal dose medications     (K59.01) Slow transit constipation  Comment: aggravates baseline anxiety  Plan:   -Senna S 2 tabs po BID  -Miralax daily and BID on Mon/Wed/Friday  -supp daily as needed    (I48.20) Chronic atrial fibrillation (H)  (I10) Essential hypertension  (Z95.0) Cardiac pacemaker in situ  Comment: chest wall pain at times with pacer. VVS, weights stable   Plan:   -metoprolol 25 mg/day for VR control, and apixaban for stroke prophylaxis    -BMP periodically     (E03.9) Acquired hypothyroidism  Comment: TSH on lower side   Plan:   -continue levothyroxine and updated TSH                Electronically signed by:  ZAFAR Bae CNP     Visit Details  Video  or Telephone End Time: 10:16 AM  Distant Location (provider location):  [facility]

## 2020-11-17 VITALS
HEIGHT: 68 IN | SYSTOLIC BLOOD PRESSURE: 130 MMHG | WEIGHT: 122.8 LBS | OXYGEN SATURATION: 99 % | RESPIRATION RATE: 17 BRPM | TEMPERATURE: 97.2 F | DIASTOLIC BLOOD PRESSURE: 80 MMHG | BODY MASS INDEX: 18.61 KG/M2 | HEART RATE: 90 BPM

## 2020-11-17 ASSESSMENT — MIFFLIN-ST. JEOR: SCORE: 1045.52

## 2020-11-18 ENCOUNTER — VIRTUAL VISIT (OUTPATIENT)
Dept: GERIATRICS | Facility: CLINIC | Age: 85
End: 2020-11-18
Payer: MEDICARE

## 2020-11-18 DIAGNOSIS — F41.9 ANXIETY: ICD-10-CM

## 2020-11-18 DIAGNOSIS — Z95.0 CARDIAC PACEMAKER IN SITU: ICD-10-CM

## 2020-11-18 DIAGNOSIS — E03.9 ACQUIRED HYPOTHYROIDISM: ICD-10-CM

## 2020-11-18 DIAGNOSIS — N18.31 STAGE 3A CHRONIC KIDNEY DISEASE (H): ICD-10-CM

## 2020-11-18 DIAGNOSIS — E46 PROTEIN-CALORIE MALNUTRITION, UNSPECIFIED SEVERITY (H): ICD-10-CM

## 2020-11-18 DIAGNOSIS — I48.20 CHRONIC ATRIAL FIBRILLATION (H): ICD-10-CM

## 2020-11-18 DIAGNOSIS — R45.1 AGITATION: ICD-10-CM

## 2020-11-18 DIAGNOSIS — I10 ESSENTIAL HYPERTENSION: ICD-10-CM

## 2020-11-18 DIAGNOSIS — F03.91 DEMENTIA WITH BEHAVIORAL DISTURBANCE, UNSPECIFIED DEMENTIA TYPE: Primary | ICD-10-CM

## 2020-11-18 DIAGNOSIS — K59.01 SLOW TRANSIT CONSTIPATION: ICD-10-CM

## 2020-11-18 DIAGNOSIS — G47.00 INSOMNIA, UNSPECIFIED TYPE: ICD-10-CM

## 2020-11-18 RX ORDER — SERTRALINE HYDROCHLORIDE 25 MG/1
50 TABLET, FILM COATED ORAL DAILY
Qty: 28 TABLET | Status: SHIPPED | OUTPATIENT
Start: 2020-11-18 | End: 2021-11-28

## 2020-11-18 NOTE — LETTER
"    11/18/2020        RE: Annabel Yin  [address] [place] [id number]        [facility]  Type of service: Video Visit  Annabel May is being evaluated via a billable visit.   The patient or first contact has been notified of following:  \"This video visit will be conducted via a call between you and your provider. We have found that certain health care needs can be provided without the need for an in-person physical exam.  This service lets us provide the care you need with a video conversation. If during the course of the call the provider feels a video visit is not appropriate, you will not be charged for this service.\"   The provider has received verbal consent for a Video or Telephone Visit from the patient and or first contact? Yes  Video or Telephone Start Time: 10:08 AM  [facility] Medical Record Number: [id number]  Where the Patient is living now: [facility]  Chief Complaint   Patient presents with     Video Visit     Annual Comprehensive Exam Assisted Living     HPI:    Annabel May is a 86 year old (5/12/1934), who is being seen today for an annual comprehensive visit. HPI information obtained from: facility chart records, facility staff, patient report and [facility] Epic chart review. Today's concerns are:    Seen today for follow up to ongoing conditions. In community room and gets teary talking about missing her family. \"I want to see my son\". She denies pain, SOB, abdominal pain. Staff with no acute concerns. Up and ambulatory w/o assistive device.     ALLERGIES: Midazolam and Vecuronium  PAST MEDICAL HISTORY:  has a past medical history of Arrhythmia, Arthritis, Breast cancer (H), Hyperlipidaemia, Hypertension, Hypothyroid, Migraines, PONV (postoperative nausea and vomiting), and Syncope. She also has no past medical history of Malignant neoplasm (H).  PAST SURGICAL HISTORY:  has a past surgical history that includes " Breast surgery; Head and neck surgery; ENT surgery; Hysterectomy; GYN surgery; appendectomy; Cholecystectomy; Phacoemulsification clear cornea with standard intraocular lens implant, endoscopic cyclophotocoagulation, combined (6/28/2012); and Glaucoma surgery (Left, 10/16/2017).  IMMUNIZATIONS:  Immunization History   Administered Date(s) Administered     Flu, Unspecified 10/23/2003, 10/20/2004, 10/17/2005, 10/23/2006, 09/24/2010     O2t9-13 Novel Flu 12/15/2009     Influenza (H1N1) 12/15/2009     Influenza (High Dose) 3 valent vaccine 09/01/2015, 10/17/2015, 09/29/2016, 10/05/2017, 10/04/2018, 10/02/2019     Influenza (IIV3) PF 12/06/2000, 10/26/2001, 10/28/2002, 10/23/2003, 10/20/2004, 10/17/2005, 10/23/2006, 10/04/2008, 09/24/2010, 09/11/2012, 10/18/2013, 10/05/2017, 10/04/2018     Influenza, Quad, High Dose, Pf, 65yr + 09/23/2020     Pneumo Conj 13-V (2010&after) 08/02/2016     Pneumococcal 23 valent 05/21/2004, 07/10/2014     TDAP Vaccine (Adacel) 01/22/2020     Td (Adult), Adsorbed 05/16/2008     Zoster vaccine recombinant adjuvanted (SHINGRIX) 12/13/2018, 02/28/2019     Zoster vaccine, live 07/30/2007     Above immunizations pulled from [facility]. [facility] and facility records also reconciled. Outstanding information sent to  to update [facility].  Future immunizations are not needed at this point as all recommended immunizations are up to date.     Current Outpatient Medications   Medication Sig Dispense Refill     ACETAMINOPHEN EXTRA STRENGTH 500 MG tablet TAKE TWO TABLETS (1000MG) BY MOUTH TWICE DAILY;AND MAY TAKE ONE TABLET (500MG) BY MOUTH TWICE DAILY AS NEEDED FOR MILD PAIN 112 tablet 97     AZOPT 1 % ophthalmic suspension INSTILL ONE DROP IN EACH EYE TWICE DAILY 10 mL 97     bisacodyl (DULCOLAX) 10 MG suppository Place 1 suppository (10 mg) rectally daily as needed for constipation Give if no record bowel movement x 3 days 10 suppository 11     ELIQUIS ANTICOAGULANT 2.5 MG tablet  TAKE 1 TABLET BY MOUTH TWICE DAILY 80 tablet 11     latanoprost (XALATAN) 0.005 % ophthalmic solution INSTILL ONE DROP IN EACH EYE AT BEDTIME 2.5 mL 97     levothyroxine (SYNTHROID/LEVOTHROID) 75 MCG tablet TAKE 1 TABLET BY MOUTH ONCE DAILY 28 tablet PRN     MELATONIN MAXIMUM STRENGTH 5 MG tablet TAKE 1 TABLET BY MOUTH AT BEDTIME 37 tablet 97     metoprolol succinate ER (TOPROL-XL) 25 MG 24 hr tablet TAKE 1 TABLET BY MOUTH ONCE DAILY 28 tablet PRN     mirtazapine (REMERON) 15 MG tablet Take 1 tablet by mouth At Bedtime       Multiple Vitamins-Minerals (PRESERVISION AREDS) CAPS TAKE 1 CAPSULE BY MOUTH TWICE DAILY 60 capsule 11     polyethylene glycol (MIRALAX) 17 g packet Take 17 g by mouth daily And give 2nd dose (BID) on Mon/Wed/Friday. Hold for loose stools 17 g 11     QUEtiapine (SEROQUEL) 25 MG tablet Take 1 tablet (25 mg) by mouth 3 times daily 84 tablet 97     QUEtiapine (SEROQUEL) 25 MG tablet Take 1 tablet (25 mg) by mouth 2 times daily as needed (agitation) 30 tablet 0     SENEXON-S 8.6-50 MG tablet TAKE TWO TABLETS BY MOUTH TWICE DAILY HOLD FOR LOOSE STOOL 124 tablet PRN     sertraline (ZOLOFT) 25 MG tablet TAKE 1 TABLET BY MOUTH ONCE DAILY 28 tablet PRN     traZODone (DESYREL) 50 MG tablet Take 1 tablet (50 mg) by mouth At Bedtime 30 tablet 3     vitamin B-12 (CYANOCOBALAMIN) 100 MCG tablet TAKE 1 TABLET BY MOUTH ONCE DAILY 37 tablet 97     Case Management:  I have reviewed the Assisted Living care plan, current immunizations and preventive care/cancer screening. .Future cancer screening is not clinically indicated secondary to age/goals of care Patient's desire to return to the community is present, but is not able due to care needs . Current Level of Care is appropriate.    Advance Directive Discussion:    I reviewed the current advanced directives as reflected in EPIC, the POLST and the facility chart, and verified the congruency of orders. I contacted the first party son Kelvin and discussed the plan  "of Care. I did not due to cognitive impairment review the advance directives with the resident.     Team Discussion:  I communicated with the appropriate disciplines involved with the Plan of Care: Nursing    Patient's goal is pain control and comfort. Treat reversible conditions   Information reviewed:  Medications, vital signs, orders, and nursing notes.    ROS:  Limited secondary to cognitive impairment but today pt reports ok    Vitals:  /80   Pulse 90   Temp 97.2  F (36.2  C)   Resp 17   Ht 1.727 m (5' 8\")   Wt 55.7 kg (122 lb 12.8 oz)   SpO2 99%   BMI 18.67 kg/m   Body mass index is 18.67 kg/m .  Exam:  GENERAL APPEARANCE:  Alert, in no distress but anxious, teary at times   ENT:  Mouth and posterior oropharynx normal, dry mucous membranes, normal hearing acuity  EYES:  EOM, conjunctivae, lids, pupils and irises normal-glasses on   RESP:  respiratory effort with no labored breathing  CV:  Palpation and auscultation of heart done , regular rate and rhythm, no edema  ABDOMEN: thin with baseline abdominal distension   M/S:   Gait and station at baseline w/o assistive device   SKIN:  Inspection of skin and subcutaneous tissue baseline to visualized areas. Cyst/nodule hard on index finger right hand   NEURO:   Cranial nerves 2-12 are normal tested and grossly at patient's baseline  PSYCH:  insight and judgement impaired, memory impaired    Lab/Diagnostic data:   CBC RESULTS:   Recent Labs   Lab Test 09/19/20  0704 02/28/20  0941   WBC 7.3 8.6   RBC 4.16 4.15   HGB 13.9 13.3   HCT 42.5 40.1   * 97   MCH 33.4* 32.0   MCHC 32.7 33.2   RDW 14.3 14.2    170       Last Basic Metabolic Panel:  Recent Labs   Lab Test 09/19/20  0704 02/28/20  0941    136   POTASSIUM 3.8 4.8   CHLORIDE 110* 109   AMERICO 9.3 9.3   CO2 24 23   BUN 14 22   CR 0.89 0.95   GLC 85 78       Liver Function Studies -   Recent Labs   Lab Test 01/06/20  1530 07/10/14   PROTTOTAL 7.7 6.9   ALBUMIN 4.4  --    BILITOTAL 0.9 " 0.6   ALKPHOS 66 77   AST 31 19   ALT 29 10       TSH   Date Value Ref Range Status   07/10/2014 0.98 0.35 - 4.94 mcU/mL Final   08/03/2012 6.61 (H) 0.4 - 5.0 mU/L Final     TSH 2.21 on 10/2/2019     No results found for: A1C    ASSESSMENT/PLAN  (F03.91) Dementia with behavioral disturbance, unspecified dementia type (H)  (primary encounter diagnosis)  (F41.9) Anxiety  (R45.1) Agitation  (G47.00) Insomnia, unspecified type  Comment: flares at times and frequently teary   Plan:   -melatonin and trazodone at HS for insomnia in addition to mirtazapine   -Seroquel 25 mg po TID and twice daily prn (prn used 5x this month)  -starting Sertraline 25 mg po daily (has used in the past) will increase to 50 mg po daily   -left message for her son to review imaging and plan of care  -encourage oral intake of fluids     (E46) Protein-calorie malnutrition, unspecified severity (H)  Comment: weight stable. Thickened thoracic esophagus with possible adjacent or associated mass posterior to the distal trachea. If clinically indicated, CT of the chest might be helpful in further evaluation, as per radiology.  Plan:   -encourage oral intake of fluids and food  -facility to follow weights   -discussed imaging with her son who is a radiologist    (N18.31) Stage 3a chronic kidney disease  Comment: chronic  Plan:   -encourage oral intake of fluids, renal dose medications     (K59.01) Slow transit constipation  Comment: aggravates baseline anxiety  Plan:   -Senna S 2 tabs po BID  -Miralax daily and BID on Mon/Wed/Friday  -supp daily as needed    (I48.20) Chronic atrial fibrillation (H)  (I10) Essential hypertension  (Z95.0) Cardiac pacemaker in situ  Comment: chest wall pain at times with pacer. VVS, weights stable   Plan:   -metoprolol 25 mg/day for VR control, and apixaban for stroke prophylaxis    -BMP periodically     (E03.9) Acquired hypothyroidism  Comment: TSH on lower side   Plan:   -continue levothyroxine and updated  TSH                Electronically signed by:  ZAFAR Bae CNP     Visit Details  Video or Telephone End Time: 10:16 AM  Distant Location (provider location):  [facility]         Sincerely,        ZAFAR Bae CNP

## 2020-11-19 ENCOUNTER — HOSPITAL LABORATORY (OUTPATIENT)
Dept: OTHER | Facility: CLINIC | Age: 85
End: 2020-11-19

## 2020-11-20 LAB
SARS-COV-2 RNA SPEC QL NAA+PROBE: NOT DETECTED
SPECIMEN SOURCE: NORMAL

## 2020-11-25 ENCOUNTER — TRANSFERRED RECORDS (OUTPATIENT)
Dept: HEALTH INFORMATION MANAGEMENT | Facility: CLINIC | Age: 85
End: 2020-11-25

## 2020-11-25 ENCOUNTER — HOSPITAL LABORATORY (OUTPATIENT)
Dept: OTHER | Facility: CLINIC | Age: 85
End: 2020-11-25

## 2020-11-25 ENCOUNTER — TELEPHONE (OUTPATIENT)
Dept: GERIATRICS | Facility: CLINIC | Age: 85
End: 2020-11-25

## 2020-11-25 DIAGNOSIS — E03.9 ACQUIRED HYPOTHYROIDISM: ICD-10-CM

## 2020-11-25 LAB
ANION GAP SERPL CALCULATED.3IONS-SCNC: 3 MMOL/L (ref 3–14)
BUN SERPL-MCNC: 21 MG/DL (ref 7–30)
CALCIUM SERPL-MCNC: 8.8 MG/DL (ref 8.5–10.1)
CHLORIDE SERPL-SCNC: 111 MMOL/L (ref 94–109)
CO2 SERPL-SCNC: 26 MMOL/L (ref 20–32)
CREAT SERPL-MCNC: 0.92 MG/DL (ref 0.52–1.04)
ERYTHROCYTE [DISTWIDTH] IN BLOOD BY AUTOMATED COUNT: 14.2 % (ref 10–15)
GFR SERPL CREATININE-BSD FRML MDRD: 56 ML/MIN/{1.73_M2}
GLUCOSE SERPL-MCNC: 87 MG/DL (ref 70–99)
HCT VFR BLD AUTO: 41.4 % (ref 35–47)
HGB BLD-MCNC: 13.3 G/DL (ref 11.7–15.7)
MCH RBC QN AUTO: 33.1 PG (ref 26.5–33)
MCHC RBC AUTO-ENTMCNC: 32.1 G/DL (ref 31.5–36.5)
MCV RBC AUTO: 103 FL (ref 78–100)
PLATELET # BLD AUTO: 179 10E9/L (ref 150–450)
POTASSIUM SERPL-SCNC: 3.8 MMOL/L (ref 3.4–5.3)
RBC # BLD AUTO: 4.02 10E12/L (ref 3.8–5.2)
SODIUM SERPL-SCNC: 140 MMOL/L (ref 133–144)
TSH SERPL DL<=0.005 MIU/L-ACNC: 8.3 MU/L (ref 0.4–4)
WBC # BLD AUTO: 5.4 10E9/L (ref 4–11)

## 2020-11-25 RX ORDER — LEVOTHYROXINE SODIUM 88 UG/1
88 TABLET ORAL DAILY
Start: 2020-11-25 | End: 2020-12-03

## 2020-11-25 NOTE — TELEPHONE ENCOUNTER
TSH = 8.3    Currently on 75mcg daily of levothyroxine    Orders:  Increase Levothyroxine to 88mcg po daily  TSH in 4 weeks    Electronically signed by Kamryn Ace RN, CNP

## 2020-12-02 DIAGNOSIS — E03.9 ACQUIRED HYPOTHYROIDISM: ICD-10-CM

## 2020-12-02 DIAGNOSIS — F33.41 RECURRENT MAJOR DEPRESSION IN PARTIAL REMISSION (H): ICD-10-CM

## 2020-12-03 ENCOUNTER — HOSPITAL LABORATORY (OUTPATIENT)
Dept: OTHER | Facility: CLINIC | Age: 85
End: 2020-12-03

## 2020-12-03 RX ORDER — LEVOTHYROXINE SODIUM 88 UG/1
TABLET ORAL
Qty: 29 TABLET | Status: SHIPPED | OUTPATIENT
Start: 2020-12-03 | End: 2021-12-24

## 2020-12-03 RX ORDER — TRAZODONE HYDROCHLORIDE 50 MG/1
TABLET, FILM COATED ORAL
Qty: 28 TABLET | Status: SHIPPED | OUTPATIENT
Start: 2020-12-03 | End: 2021-12-24

## 2020-12-04 LAB
SARS-COV-2 RNA SPEC QL NAA+PROBE: NOT DETECTED
SPECIMEN SOURCE: NORMAL

## 2020-12-10 ENCOUNTER — HOSPITAL LABORATORY (OUTPATIENT)
Dept: OTHER | Facility: CLINIC | Age: 85
End: 2020-12-10

## 2020-12-11 LAB
SARS-COV-2 RNA SPEC QL NAA+PROBE: NOT DETECTED
SPECIMEN SOURCE: NORMAL

## 2020-12-18 ENCOUNTER — HOSPITAL LABORATORY (OUTPATIENT)
Dept: OTHER | Facility: CLINIC | Age: 85
End: 2020-12-18

## 2020-12-19 LAB
SARS-COV-2 RNA SPEC QL NAA+PROBE: NOT DETECTED
SPECIMEN SOURCE: NORMAL

## 2020-12-23 ENCOUNTER — HOSPITAL LABORATORY (OUTPATIENT)
Dept: OTHER | Facility: CLINIC | Age: 85
End: 2020-12-23

## 2020-12-23 LAB — TSH SERPL DL<=0.005 MIU/L-ACNC: 4.8 MU/L (ref 0.4–4)

## 2021-01-05 NOTE — PROGRESS NOTES
[facility]  [facility] Record Number: [id number]  Place of Service where encounter took place: [facility] [[id number]]  Chief Complaint   Patient presents with     RECHECK       HPI:    Annabel May is a 86 year old (5/12/1934), who is being seen today for an episodic care visit. HPI information obtained from: facility chart records and [facility] chart review. Today's concern is:    Seen today in community room. Ambulates w/o assistive device. Less teary today but still asking to see her son Kelvin. Abdomen is chronic firm. Noted in chart review 2 bowel movements today and does have hx of constipation. Episode of chest wall discomfort which has happened before-has a pacemaker and causes this at times. Family elected to discontinue pacer checks with admission to memory care.     Past Medical and Surgical History reviewed in Epic today.    MEDICATIONS:    Current Outpatient Medications   Medication Sig Dispense Refill     ACETAMINOPHEN EXTRA STRENGTH 500 MG tablet TAKE TWO TABLETS (1000MG) BY MOUTH TWICE DAILY;AND MAY TAKE ONE TABLET (500MG) BY MOUTH TWICE DAILY AS NEEDED FOR MILD PAIN 112 tablet 97     AZOPT 1 % ophthalmic suspension INSTILL ONE DROP IN EACH EYE TWICE DAILY 10 mL 97     bisacodyl (DULCOLAX) 10 MG suppository Place 1 suppository (10 mg) rectally daily as needed for constipation Give if no record bowel movement x 3 days 10 suppository 11     ELIQUIS ANTICOAGULANT 2.5 MG tablet TAKE 1 TABLET BY MOUTH TWICE DAILY 80 tablet 11     latanoprost (XALATAN) 0.005 % ophthalmic solution INSTILL ONE DROP IN EACH EYE AT BEDTIME 2.5 mL 97     levothyroxine (SYNTHROID/LEVOTHROID) 88 MCG tablet TAKE 1 TABLET BY MOUTH ONCE DAILY 29 tablet PRN     MELATONIN MAXIMUM STRENGTH 5 MG tablet TAKE 1 TABLET BY MOUTH AT BEDTIME 37 tablet 97     metoprolol succinate ER (TOPROL-XL) 25 MG 24 hr tablet TAKE 1 TABLET BY MOUTH ONCE DAILY 28 tablet PRN     mirtazapine  "(REMERON) 15 MG tablet Take 1 tablet by mouth At Bedtime       Multiple Vitamins-Minerals (PRESERVISION AREDS) CAPS TAKE 1 CAPSULE BY MOUTH TWICE DAILY 60 capsule 11     polyethylene glycol (MIRALAX) 17 g packet Take 17 g by mouth daily And give 2nd dose (BID) on Mon/Wed/Friday. Hold for loose stools 17 g 11     QUEtiapine (SEROQUEL) 25 MG tablet Take 1 tablet (25 mg) by mouth 3 times daily 84 tablet 97     QUEtiapine (SEROQUEL) 25 MG tablet Take 1 tablet (25 mg) by mouth 2 times daily as needed (agitation) 30 tablet 0     SENEXON-S 8.6-50 MG tablet TAKE TWO TABLETS BY MOUTH TWICE DAILY HOLD FOR LOOSE STOOL 124 tablet PRN     sertraline (ZOLOFT) 25 MG tablet Take 2 tablets (50 mg) by mouth daily 28 tablet PRN     traZODone (DESYREL) 50 MG tablet TAKE 1 TABLET BY MOUTH AT BEDTIME 28 tablet PRN     vitamin B-12 (CYANOCOBALAMIN) 100 MCG tablet TAKE 1 TABLET BY MOUTH ONCE DAILY 37 tablet 97     REVIEW OF SYSTEMS:  Limited secondary to cognitive impairment but today pt reports ok    Objective:  /80   Pulse 90   Temp 97.8  F (36.6  C)   Resp 17   Ht 1.727 m (5' 8\")   Wt 55.7 kg (122 lb 12.8 oz)   SpO2 96%   BMI 18.67 kg/m    Exam:  GENERAL APPEARANCE:  Alert, in no distress, anxious at baseline   ENT:  Mouth and posterior oropharynx normal, moist mucous membranes, normal hearing acuity  EYES:  EOM, conjunctivae, lids, pupils and irises normal  RESP:  respiratory effort and palpation of chest normal, lungs clear to auscultation   CV:  Palpation and auscultation of heart done , regular rate and rhythm, no murmur, rub, or gallop, no edema  ABDOMEN: hypoactive BS, abdominal area is firm  M/S:   Gait and station normal  SKIN:  Inspection of skin and subcutaneous tissue baseline to visualized areas. Cyst/nodule hard on index finger right hand  NEURO:   Cranial nerves 2-12 are normal tested and grossly at patient's baseline  PSYCH:  insight and judgement impaired, memory impaired     Labs:   CBC RESULTS:   Recent " Labs   Lab Test 11/25/20  0653 09/19/20  0704   WBC 5.4 7.3   RBC 4.02 4.16   HGB 13.3 13.9   HCT 41.4 42.5   * 102*   MCH 33.1* 33.4*   MCHC 32.1 32.7   RDW 14.2 14.3    177       Last Basic Metabolic Panel:  Recent Labs   Lab Test 11/25/20  0653 09/19/20  0704    139   POTASSIUM 3.8 3.8   CHLORIDE 111* 110*   AMERICO 8.8 9.3   CO2 26 24   BUN 21 14   CR 0.92 0.89   GLC 87 85       Liver Function Studies -   Recent Labs   Lab Test 01/06/20  1530 07/10/14   PROTTOTAL 7.7 6.9   ALBUMIN 4.4  --    BILITOTAL 0.9 0.6   ALKPHOS 66 77   AST 31 19   ALT 29 10       TSH   Date Value Ref Range Status   12/23/2020 4.80 (H) 0.40 - 4.00 mU/L Final   11/25/2020 8.30 (H) 0.40 - 4.00 mU/L Final       No results found for: A1C    ASSESSMENT/PLAN:  (F03.91) Dementia with behavioral disturbance, unspecified dementia type (H)  (primary encounter diagnosis)  (F33.1) Moderate episode of recurrent major depressive disorder (H)  Comment: anxious at times   Plan:   Seroquel 25 mg po TID prn for agitation and twice daily prn   -Sertraline 50 mg po daily     (E46) Protein-calorie malnutrition, unspecified severity (H)  Comment: slight weight loss  Plan:   -mirtazapine 15 mg po daily at HS    (I48.20) Chronic atrial fibrillation (H)  Comment: stable  Plan:  -Eliquis 2.5 mg po BID  -metoprolol 25 mg po daily for rate control     (N18.31) Stage 3a chronic kidney disease  Comment: stable   Plan:   -renal dose and avoid nephrotoxins     (K59.01) Slow transit constipation  Comment: BM every 1-3 days  Plan:  -Miralax, Senna S  -daily prn supp if needed             Electronically signed by:  Reese Morfin, APRN CNP

## 2021-01-06 ENCOUNTER — ASSISTED LIVING VISIT (OUTPATIENT)
Dept: GERIATRICS | Facility: CLINIC | Age: 86
End: 2021-01-06
Payer: MEDICARE

## 2021-01-06 VITALS
RESPIRATION RATE: 17 BRPM | HEIGHT: 68 IN | BODY MASS INDEX: 18.61 KG/M2 | TEMPERATURE: 97.8 F | SYSTOLIC BLOOD PRESSURE: 130 MMHG | DIASTOLIC BLOOD PRESSURE: 80 MMHG | HEART RATE: 90 BPM | WEIGHT: 122.8 LBS | OXYGEN SATURATION: 96 %

## 2021-01-06 DIAGNOSIS — F33.1 MODERATE EPISODE OF RECURRENT MAJOR DEPRESSIVE DISORDER (H): ICD-10-CM

## 2021-01-06 DIAGNOSIS — K59.01 SLOW TRANSIT CONSTIPATION: ICD-10-CM

## 2021-01-06 DIAGNOSIS — N18.31 STAGE 3A CHRONIC KIDNEY DISEASE (H): ICD-10-CM

## 2021-01-06 DIAGNOSIS — I48.20 CHRONIC ATRIAL FIBRILLATION (H): ICD-10-CM

## 2021-01-06 DIAGNOSIS — F03.91 DEMENTIA WITH BEHAVIORAL DISTURBANCE, UNSPECIFIED DEMENTIA TYPE: Primary | ICD-10-CM

## 2021-01-06 DIAGNOSIS — E46 PROTEIN-CALORIE MALNUTRITION, UNSPECIFIED SEVERITY (H): ICD-10-CM

## 2021-01-06 ASSESSMENT — MIFFLIN-ST. JEOR: SCORE: 1045.52

## 2021-01-06 NOTE — LETTER
1/6/2021        RE: Annabel aPn On [place]namaria  [address] [facility] [address]        [facility]  [place] [facility] Record Number: [id number]  Place of Service where encounter took place: [facility] [[id number]]  Chief Complaint   Patient presents with     RECHECK       HPI:    Annabel May is a 86 year old (5/12/1934), who is being seen today for an episodic care visit. HPI information obtained from: facility chart records and [facility] chart review. Today's concern is:    Seen today in community room. Ambulates w/o assistive device. Less teary today but still asking to see her son Kelvin. Abdomen is chronic firm. Noted in chart review 2 bowel movements today and does have hx of constipation. Episode of chest wall discomfort which has happened before-has a pacemaker and causes this at times. Family elected to discontinue pacer checks with admission to memory care.     Past Medical and Surgical History reviewed in Epic today.    MEDICATIONS:    Current Outpatient Medications   Medication Sig Dispense Refill     ACETAMINOPHEN EXTRA STRENGTH 500 MG tablet TAKE TWO TABLETS (1000MG) BY MOUTH TWICE DAILY;AND MAY TAKE ONE TABLET (500MG) BY MOUTH TWICE DAILY AS NEEDED FOR MILD PAIN 112 tablet 97     AZOPT 1 % ophthalmic suspension INSTILL ONE DROP IN EACH EYE TWICE DAILY 10 mL 97     bisacodyl (DULCOLAX) 10 MG suppository Place 1 suppository (10 mg) rectally daily as needed for constipation Give if no record bowel movement x 3 days 10 suppository 11     ELIQUIS ANTICOAGULANT 2.5 MG tablet TAKE 1 TABLET BY MOUTH TWICE DAILY 80 tablet 11     latanoprost (XALATAN) 0.005 % ophthalmic solution INSTILL ONE DROP IN EACH EYE AT BEDTIME 2.5 mL 97     levothyroxine (SYNTHROID/LEVOTHROID) 88 MCG tablet TAKE 1 TABLET BY MOUTH ONCE DAILY 29 tablet PRN     MELATONIN MAXIMUM STRENGTH 5 MG tablet TAKE 1 TABLET BY MOUTH AT BEDTIME 37 tablet 97     metoprolol succinate ER  "(TOPROL-XL) 25 MG 24 hr tablet TAKE 1 TABLET BY MOUTH ONCE DAILY 28 tablet PRN     mirtazapine (REMERON) 15 MG tablet Take 1 tablet by mouth At Bedtime       Multiple Vitamins-Minerals (PRESERVISION AREDS) CAPS TAKE 1 CAPSULE BY MOUTH TWICE DAILY 60 capsule 11     polyethylene glycol (MIRALAX) 17 g packet Take 17 g by mouth daily And give 2nd dose (BID) on Mon/Wed/Friday. Hold for loose stools 17 g 11     QUEtiapine (SEROQUEL) 25 MG tablet Take 1 tablet (25 mg) by mouth 3 times daily 84 tablet 97     QUEtiapine (SEROQUEL) 25 MG tablet Take 1 tablet (25 mg) by mouth 2 times daily as needed (agitation) 30 tablet 0     SENEXON-S 8.6-50 MG tablet TAKE TWO TABLETS BY MOUTH TWICE DAILY HOLD FOR LOOSE STOOL 124 tablet PRN     sertraline (ZOLOFT) 25 MG tablet Take 2 tablets (50 mg) by mouth daily 28 tablet PRN     traZODone (DESYREL) 50 MG tablet TAKE 1 TABLET BY MOUTH AT BEDTIME 28 tablet PRN     vitamin B-12 (CYANOCOBALAMIN) 100 MCG tablet TAKE 1 TABLET BY MOUTH ONCE DAILY 37 tablet 97     REVIEW OF SYSTEMS:  Limited secondary to cognitive impairment but today pt reports ok    Objective:  /80   Pulse 90   Temp 97.8  F (36.6  C)   Resp 17   Ht 1.727 m (5' 8\")   Wt 55.7 kg (122 lb 12.8 oz)   SpO2 96%   BMI 18.67 kg/m    Exam:  GENERAL APPEARANCE:  Alert, in no distress, anxious at baseline   ENT:  Mouth and posterior oropharynx normal, moist mucous membranes, normal hearing acuity  EYES:  EOM, conjunctivae, lids, pupils and irises normal  RESP:  respiratory effort and palpation of chest normal, lungs clear to auscultation   CV:  Palpation and auscultation of heart done , regular rate and rhythm, no murmur, rub, or gallop, no edema  ABDOMEN: hypoactive BS, abdominal area is firm  M/S:   Gait and station normal  SKIN:  Inspection of skin and subcutaneous tissue baseline to visualized areas. Cyst/nodule hard on index finger right hand  NEURO:   Cranial nerves 2-12 are normal tested and grossly at patient's " baseline  PSYCH:  insight and judgement impaired, memory impaired     Labs:   CBC RESULTS:   Recent Labs   Lab Test 11/25/20  0653 09/19/20  0704   WBC 5.4 7.3   RBC 4.02 4.16   HGB 13.3 13.9   HCT 41.4 42.5   * 102*   MCH 33.1* 33.4*   MCHC 32.1 32.7   RDW 14.2 14.3    177       Last Basic Metabolic Panel:  Recent Labs   Lab Test 11/25/20  0653 09/19/20  0704    139   POTASSIUM 3.8 3.8   CHLORIDE 111* 110*   AMERICO 8.8 9.3   CO2 26 24   BUN 21 14   CR 0.92 0.89   GLC 87 85       Liver Function Studies -   Recent Labs   Lab Test 01/06/20  1530 07/10/14   PROTTOTAL 7.7 6.9   ALBUMIN 4.4  --    BILITOTAL 0.9 0.6   ALKPHOS 66 77   AST 31 19   ALT 29 10       TSH   Date Value Ref Range Status   12/23/2020 4.80 (H) 0.40 - 4.00 mU/L Final   11/25/2020 8.30 (H) 0.40 - 4.00 mU/L Final       No results found for: A1C    ASSESSMENT/PLAN:  (F03.91) Dementia with behavioral disturbance, unspecified dementia type (H)  (primary encounter diagnosis)  (F33.1) Moderate episode of recurrent major depressive disorder (H)  Comment: anxious at times   Plan:   Seroquel 25 mg po TID prn for agitation and twice daily prn   -Sertraline 50 mg po daily     (E46) Protein-calorie malnutrition, unspecified severity (H)  Comment: slight weight loss  Plan:   -mirtazapine 15 mg po daily at HS    (I48.20) Chronic atrial fibrillation (H)  Comment: stable  Plan:  -Eliquis 2.5 mg po BID  -metoprolol 25 mg po daily for rate control     (N18.31) Stage 3a chronic kidney disease  Comment: stable   Plan:   -renal dose and avoid nephrotoxins     (K59.01) Slow transit constipation  Comment: BM every 1-3 days  Plan:  -Miralax, Senna S  -daily prn supp if needed             Electronically signed by:  ZAFAR Bae CNP            Sincerely,        ZAFAR Bae CNP

## 2021-01-07 ENCOUNTER — HOSPITAL LABORATORY (OUTPATIENT)
Dept: OTHER | Facility: CLINIC | Age: 86
End: 2021-01-07

## 2021-01-08 LAB
SARS-COV-2 RNA RESP QL NAA+PROBE: NOT DETECTED
SPECIMEN SOURCE: NORMAL

## 2021-01-24 DIAGNOSIS — H35.30 MACULAR DEGENERATION (SENILE) OF RETINA: ICD-10-CM

## 2021-01-25 RX ORDER — VIT A/VIT C/VIT E/ZINC/COPPER 4296-226
CAPSULE ORAL
Qty: 56 CAPSULE | Status: SHIPPED | OUTPATIENT
Start: 2021-01-25 | End: 2021-12-09

## 2021-02-19 DIAGNOSIS — E53.8 VITAMIN B 12 DEFICIENCY: ICD-10-CM

## 2021-02-19 DIAGNOSIS — I48.20 CHRONIC ATRIAL FIBRILLATION (H): ICD-10-CM

## 2021-02-19 DIAGNOSIS — G47.00 INSOMNIA, UNSPECIFIED TYPE: ICD-10-CM

## 2021-02-22 RX ORDER — PSYLLIUM HUSK 0.4 G
CAPSULE ORAL
Qty: 28 TABLET | Status: SHIPPED | OUTPATIENT
Start: 2021-02-22 | End: 2022-01-01

## 2021-02-22 RX ORDER — UBIDECARENONE 75 MG
CAPSULE ORAL
Qty: 28 TABLET | Status: SHIPPED | OUTPATIENT
Start: 2021-02-22 | End: 2022-01-01

## 2021-02-22 RX ORDER — APIXABAN 2.5 MG/1
TABLET, FILM COATED ORAL
Qty: 56 TABLET | Status: SHIPPED | OUTPATIENT
Start: 2021-02-22 | End: 2022-01-01

## 2021-03-18 DIAGNOSIS — I10 BENIGN ESSENTIAL HYPERTENSION: ICD-10-CM

## 2021-03-18 RX ORDER — METOPROLOL SUCCINATE 25 MG/1
TABLET, EXTENDED RELEASE ORAL
Qty: 28 TABLET | Status: SHIPPED | OUTPATIENT
Start: 2021-03-18 | End: 2021-12-09

## 2021-03-19 ENCOUNTER — TRANSFERRED RECORDS (OUTPATIENT)
Dept: HEALTH INFORMATION MANAGEMENT | Facility: CLINIC | Age: 86
End: 2021-03-19

## 2021-03-24 ENCOUNTER — ASSISTED LIVING VISIT (OUTPATIENT)
Dept: GERIATRICS | Facility: CLINIC | Age: 86
End: 2021-03-24
Payer: MEDICARE

## 2021-03-24 ENCOUNTER — HOSPITAL LABORATORY (OUTPATIENT)
Dept: OTHER | Facility: CLINIC | Age: 86
End: 2021-03-24

## 2021-03-24 VITALS
SYSTOLIC BLOOD PRESSURE: 127 MMHG | HEART RATE: 81 BPM | TEMPERATURE: 98 F | DIASTOLIC BLOOD PRESSURE: 72 MMHG | RESPIRATION RATE: 16 BRPM | OXYGEN SATURATION: 97 % | BODY MASS INDEX: 18.67 KG/M2 | WEIGHT: 122.8 LBS

## 2021-03-24 DIAGNOSIS — F33.1 MODERATE EPISODE OF RECURRENT MAJOR DEPRESSIVE DISORDER (H): ICD-10-CM

## 2021-03-24 DIAGNOSIS — K59.01 SLOW TRANSIT CONSTIPATION: ICD-10-CM

## 2021-03-24 DIAGNOSIS — E46 PROTEIN-CALORIE MALNUTRITION, UNSPECIFIED SEVERITY (H): ICD-10-CM

## 2021-03-24 DIAGNOSIS — F03.91 DEMENTIA WITH BEHAVIORAL DISTURBANCE, UNSPECIFIED DEMENTIA TYPE: Primary | ICD-10-CM

## 2021-03-24 DIAGNOSIS — I48.20 CHRONIC ATRIAL FIBRILLATION (H): ICD-10-CM

## 2021-03-24 DIAGNOSIS — N18.31 STAGE 3A CHRONIC KIDNEY DISEASE (H): ICD-10-CM

## 2021-03-24 LAB
LABORATORY COMMENT REPORT: NORMAL
SARS-COV-2 RNA RESP QL NAA+PROBE: NEGATIVE
SARS-COV-2 RNA RESP QL NAA+PROBE: NORMAL
SPECIMEN SOURCE: NORMAL
SPECIMEN SOURCE: NORMAL

## 2021-03-24 NOTE — LETTER
3/24/2021        RE: Annabel Pan On [place]namaria  [address] [facility] [address]        [place] [facility]  [place] [facility] Record Number:  [id number]  Place of Service where encounter took place:  [facility] [[id number]]  Chief Complaint   Patient presents with     RECHECK       HPI:    Annabel May  is a 86 year old (5/12/1934), who is being seen today for an episodic care visit.  HPI information obtained from: facility chart records, facility staff, patient report and [facility] chart review. Today's concern is:    Seen today for follow up to chronic conditions. Continues to be anxious and upset at baseline. Tearful and worried she does not look comfortable. With advanced dementia unable to give much insight. Constipation does add to agitation so staff is attempting to monitor bowel habits although she toilets herself often. Recent x-ray showed normal amount of stool and episode of loose stool 3/23 so bowel medications held. Weight has been stable 122-125 lbs. Episodes of chest wall discomfort which have happened before-has a pacemaker and causes this at times. Family elected to discontinue pacer checks with admission to memory care.      Past Medical and Surgical History reviewed in Epic today.    MEDICATIONS:    Current Outpatient Medications   Medication Sig Dispense Refill     ACETAMINOPHEN EXTRA STRENGTH 500 MG tablet TAKE TWO TABLETS (1000MG) BY MOUTH TWICE DAILY;AND MAY TAKE ONE TABLET (500MG) BY MOUTH TWICE DAILY AS NEEDED FOR MILD PAIN 112 tablet 97     AZOPT 1 % ophthalmic suspension INSTILL ONE DROP IN EACH EYE TWICE DAILY 10 mL 97     bisacodyl (DULCOLAX) 10 MG suppository Place 1 suppository (10 mg) rectally daily as needed for constipation Give if no record bowel movement x 3 days 10 suppository 11     cyanocobalamin (VITAMIN B-12) 100 MCG tablet TAKE 1 TABLET BY MOUTH ONCE DAILY 28 tablet PRN     ELIQUIS ANTICOAGULANT 2.5 MG tablet TAKE  1 TABLET BY MOUTH TWICE DAILY 56 tablet PRN     latanoprost (XALATAN) 0.005 % ophthalmic solution INSTILL ONE DROP IN EACH EYE AT BEDTIME 2.5 mL 97     levothyroxine (SYNTHROID/LEVOTHROID) 88 MCG tablet TAKE 1 TABLET BY MOUTH ONCE DAILY 29 tablet PRN     MELATONIN MAXIMUM STRENGTH 5 MG tablet TAKE 1 TABLET BY MOUTH AT BEDTIME 28 tablet PRN     metoprolol succinate ER (TOPROL-XL) 25 MG 24 hr tablet TAKE 1 TABLET BY MOUTH ONCE DAILY 28 tablet PRN     mirtazapine (REMERON) 15 MG tablet Take 1 tablet by mouth At Bedtime       Multiple Vitamins-Minerals (PRESERVISION AREDS) CAPS TAKE 1 CAPSULE BY MOUTH TWICE DAILY 56 capsule PRN     polyethylene glycol (MIRALAX) 17 g packet Take 17 g by mouth daily And give 2nd dose (BID) on Mon/Wed/Friday. Hold for loose stools 17 g 11     QUEtiapine (SEROQUEL) 25 MG tablet Take 1 tablet (25 mg) by mouth 3 times daily 84 tablet 97     QUEtiapine (SEROQUEL) 25 MG tablet Take 1 tablet (25 mg) by mouth 2 times daily as needed (agitation) 30 tablet 0     SENEXON-S 8.6-50 MG tablet TAKE TWO TABLETS BY MOUTH TWICE DAILY HOLD FOR LOOSE STOOL 124 tablet PRN     sertraline (ZOLOFT) 25 MG tablet Take 2 tablets (50 mg) by mouth daily 28 tablet PRN     traZODone (DESYREL) 50 MG tablet TAKE 1 TABLET BY MOUTH AT BEDTIME 28 tablet PRN     REVIEW OF SYSTEMS:  Limited secondary to cognitive impairment but today pt reports ok    Objective:  /72   Pulse 81   Temp 98  F (36.7  C)   Resp 16   Wt 55.7 kg (122 lb 12.8 oz)   SpO2 97%   BMI 18.67 kg/m    Exam:  GENERAL APPEARANCE:  Alert, in no distress, anxious at baseline   ENT:  Mouth and posterior oropharynx normal, moist mucous membranes, normal hearing acuity  EYES:  EOM, conjunctivae, lids, pupils and irises normal  RESP:  respiratory effort and palpation of chest normal, lungs clear to auscultation   CV:  Palpation and auscultation of heart done , regular rate and rhythm, no murmur, rub, or gallop, no edema  ABDOMEN: hypoactive BS, abdominal  area is soft and slightly distended   M/S:   Gait and station at baseline   SKIN:  Inspection of skin and subcutaneous tissue baseline to visualized areas. Cyst/nodule hard on index finger right hand  NEURO:   Cranial nerves 2-12 are normal tested and grossly at patient's baseline  PSYCH:  insight and judgement impaired, memory impaired     Labs:   CBC RESULTS:   Recent Labs   Lab Test 11/25/20  0653 09/19/20  0704   WBC 5.4 7.3   RBC 4.02 4.16   HGB 13.3 13.9   HCT 41.4 42.5   * 102*   MCH 33.1* 33.4*   MCHC 32.1 32.7   RDW 14.2 14.3    177       Last Basic Metabolic Panel:  Recent Labs   Lab Test 11/25/20  0653 09/19/20  0704    139   POTASSIUM 3.8 3.8   CHLORIDE 111* 110*   AMERICO 8.8 9.3   CO2 26 24   BUN 21 14   CR 0.92 0.89   GLC 87 85       Liver Function Studies -   Recent Labs   Lab Test 01/06/20  1530 07/10/14   PROTTOTAL 7.7 6.9   ALBUMIN 4.4  --    BILITOTAL 0.9 0.6   ALKPHOS 66 77   AST 31 19   ALT 29 10       TSH   Date Value Ref Range Status   12/23/2020 4.80 (H) 0.40 - 4.00 mU/L Final   11/25/2020 8.30 (H) 0.40 - 4.00 mU/L Final     No results found for: A1C    ASSESSMENT/PLAN:  (F03.91) Dementia with behavioral disturbance, unspecified dementia type (H)  (primary encounter diagnosis)  (F33.1) Moderate episode of recurrent major depressive disorder (H)  Comment: anxious at times   Plan:   Seroquel 25 mg po TID prn for agitation and twice daily prn (used 5 times-somewhat effective)  -Sertraline 50 mg po daily-consider increased dose  -Mirtazapine 15 mg po daily at HS (consider splitting) this dose?    (K59.01) Slow transit constipation  Comment: BM every 1-3 days  Plan:  -Miralax, Senna S  -daily prn supp if needed      (E46) Protein-calorie malnutrition, unspecified severity (H)  Comment: slight weight loss  Plan:   -mirtazapine 15 mg po daily at HS     (I48.20) Chronic atrial fibrillation (H)  Comment: stable  Plan:  -Eliquis 2.5 mg po BID  -metoprolol 25 mg po daily for rate  control      (N18.31) Stage 3a chronic kidney disease  Comment: stable   Plan:   -renal dose and avoid nephrotoxins             Electronically signed by:  ZAFAR Bae CNP                 Sincerely,        ZAFAR Bae CNP

## 2021-03-24 NOTE — PROGRESS NOTES
[place] [facility]  [place] Medical Record Number:  [id number]  Place of Service where encounter took place:  [facility] [[id number]]  Chief Complaint   Patient presents with     RECHECK       HPI:    Annabel May  is a 86 year old (5/12/1934), who is being seen today for an episodic care visit.  HPI information obtained from: facility chart records, facility staff, patient report and [facility] chart review. Today's concern is:    Seen today for follow up to chronic conditions. Continues to be anxious and upset at baseline. Tearful and worried she does not look comfortable. With advanced dementia unable to give much insight. Constipation does add to agitation so staff is attempting to monitor bowel habits although she toilets herself often. Recent x-ray showed normal amount of stool and episode of loose stool 3/23 so bowel medications held. Weight has been stable 122-125 lbs. Episodes of chest wall discomfort which have happened before-has a pacemaker and causes this at times. Family elected to discontinue pacer checks with admission to memory care.      Past Medical and Surgical History reviewed in Epic today.    MEDICATIONS:    Current Outpatient Medications   Medication Sig Dispense Refill     ACETAMINOPHEN EXTRA STRENGTH 500 MG tablet TAKE TWO TABLETS (1000MG) BY MOUTH TWICE DAILY;AND MAY TAKE ONE TABLET (500MG) BY MOUTH TWICE DAILY AS NEEDED FOR MILD PAIN 112 tablet 97     AZOPT 1 % ophthalmic suspension INSTILL ONE DROP IN EACH EYE TWICE DAILY 10 mL 97     bisacodyl (DULCOLAX) 10 MG suppository Place 1 suppository (10 mg) rectally daily as needed for constipation Give if no record bowel movement x 3 days 10 suppository 11     cyanocobalamin (VITAMIN B-12) 100 MCG tablet TAKE 1 TABLET BY MOUTH ONCE DAILY 28 tablet PRN     ELIQUIS ANTICOAGULANT 2.5 MG tablet TAKE 1 TABLET BY MOUTH TWICE DAILY 56 tablet PRN     latanoprost (XALATAN) 0.005 % ophthalmic solution  INSTILL ONE DROP IN EACH EYE AT BEDTIME 2.5 mL 97     levothyroxine (SYNTHROID/LEVOTHROID) 88 MCG tablet TAKE 1 TABLET BY MOUTH ONCE DAILY 29 tablet PRN     MELATONIN MAXIMUM STRENGTH 5 MG tablet TAKE 1 TABLET BY MOUTH AT BEDTIME 28 tablet PRN     metoprolol succinate ER (TOPROL-XL) 25 MG 24 hr tablet TAKE 1 TABLET BY MOUTH ONCE DAILY 28 tablet PRN     mirtazapine (REMERON) 15 MG tablet Take 1 tablet by mouth At Bedtime       Multiple Vitamins-Minerals (PRESERVISION AREDS) CAPS TAKE 1 CAPSULE BY MOUTH TWICE DAILY 56 capsule PRN     polyethylene glycol (MIRALAX) 17 g packet Take 17 g by mouth daily And give 2nd dose (BID) on Mon/Wed/Friday. Hold for loose stools 17 g 11     QUEtiapine (SEROQUEL) 25 MG tablet Take 1 tablet (25 mg) by mouth 3 times daily 84 tablet 97     QUEtiapine (SEROQUEL) 25 MG tablet Take 1 tablet (25 mg) by mouth 2 times daily as needed (agitation) 30 tablet 0     SENEXON-S 8.6-50 MG tablet TAKE TWO TABLETS BY MOUTH TWICE DAILY HOLD FOR LOOSE STOOL 124 tablet PRN     sertraline (ZOLOFT) 25 MG tablet Take 2 tablets (50 mg) by mouth daily 28 tablet PRN     traZODone (DESYREL) 50 MG tablet TAKE 1 TABLET BY MOUTH AT BEDTIME 28 tablet PRN     REVIEW OF SYSTEMS:  Limited secondary to cognitive impairment but today pt reports ok    Objective:  /72   Pulse 81   Temp 98  F (36.7  C)   Resp 16   Wt 55.7 kg (122 lb 12.8 oz)   SpO2 97%   BMI 18.67 kg/m    Exam:  GENERAL APPEARANCE:  Alert, in no distress, anxious at baseline   ENT:  Mouth and posterior oropharynx normal, moist mucous membranes, normal hearing acuity  EYES:  EOM, conjunctivae, lids, pupils and irises normal  RESP:  respiratory effort and palpation of chest normal, lungs clear to auscultation   CV:  Palpation and auscultation of heart done , regular rate and rhythm, no murmur, rub, or gallop, no edema  ABDOMEN: hypoactive BS, abdominal area is soft and slightly distended   M/S:   Gait and station at baseline   SKIN:  Inspection of  skin and subcutaneous tissue baseline to visualized areas. Cyst/nodule hard on index finger right hand  NEURO:   Cranial nerves 2-12 are normal tested and grossly at patient's baseline  PSYCH:  insight and judgement impaired, memory impaired     Labs:   CBC RESULTS:   Recent Labs   Lab Test 11/25/20  0653 09/19/20  0704   WBC 5.4 7.3   RBC 4.02 4.16   HGB 13.3 13.9   HCT 41.4 42.5   * 102*   MCH 33.1* 33.4*   MCHC 32.1 32.7   RDW 14.2 14.3    177       Last Basic Metabolic Panel:  Recent Labs   Lab Test 11/25/20  0653 09/19/20  0704    139   POTASSIUM 3.8 3.8   CHLORIDE 111* 110*   AMERICO 8.8 9.3   CO2 26 24   BUN 21 14   CR 0.92 0.89   GLC 87 85       Liver Function Studies -   Recent Labs   Lab Test 01/06/20  1530 07/10/14   PROTTOTAL 7.7 6.9   ALBUMIN 4.4  --    BILITOTAL 0.9 0.6   ALKPHOS 66 77   AST 31 19   ALT 29 10       TSH   Date Value Ref Range Status   12/23/2020 4.80 (H) 0.40 - 4.00 mU/L Final   11/25/2020 8.30 (H) 0.40 - 4.00 mU/L Final     No results found for: A1C    ASSESSMENT/PLAN:  (F03.91) Dementia with behavioral disturbance, unspecified dementia type (H)  (primary encounter diagnosis)  (F33.1) Moderate episode of recurrent major depressive disorder (H)  Comment: anxious at times   Plan:   Seroquel 25 mg po TID prn for agitation and twice daily prn (used 5 times-somewhat effective)  -Sertraline 50 mg po daily-consider increased dose  -Mirtazapine 15 mg po daily at HS (consider splitting) this dose?    (K59.01) Slow transit constipation  Comment: BM every 1-3 days  Plan:  -Miralax, Senna S  -daily prn supp if needed      (E46) Protein-calorie malnutrition, unspecified severity (H)  Comment: slight weight loss  Plan:   -mirtazapine 15 mg po daily at HS     (I48.20) Chronic atrial fibrillation (H)  Comment: stable  Plan:  -Eliquis 2.5 mg po BID  -metoprolol 25 mg po daily for rate control      (N18.31) Stage 3a chronic kidney disease  Comment: stable   Plan:   -renal dose and avoid  nephrotoxins             Electronically signed by:  ZAFAR Bae CNP

## 2021-04-01 ENCOUNTER — HOSPITAL LABORATORY (OUTPATIENT)
Dept: OTHER | Facility: CLINIC | Age: 86
End: 2021-04-01

## 2021-04-02 LAB
SARS-COV-2 RNA RESP QL NAA+PROBE: NOT DETECTED
SPECIMEN SOURCE: NORMAL

## 2021-04-26 ENCOUNTER — HOSPITAL LABORATORY (OUTPATIENT)
Dept: OTHER | Facility: CLINIC | Age: 86
End: 2021-04-26

## 2021-04-26 ENCOUNTER — TRANSFERRED RECORDS (OUTPATIENT)
Dept: HEALTH INFORMATION MANAGEMENT | Facility: CLINIC | Age: 86
End: 2021-04-26

## 2021-04-26 LAB
ALBUMIN UR-MCNC: 10 MG/DL
APPEARANCE UR: CLEAR
BACTERIA #/AREA URNS HPF: ABNORMAL /HPF
BILIRUB UR QL STRIP: NEGATIVE
COLOR UR AUTO: YELLOW
GLUCOSE UR STRIP-MCNC: NEGATIVE MG/DL
HGB UR QL STRIP: NEGATIVE
HYALINE CASTS #/AREA URNS LPF: 1 /LPF (ref 0–2)
KETONES UR STRIP-MCNC: NEGATIVE MG/DL
LEUKOCYTE ESTERASE UR QL STRIP: ABNORMAL
MUCOUS THREADS #/AREA URNS LPF: PRESENT /LPF
NITRATE UR QL: NEGATIVE
PH UR STRIP: 6 PH (ref 5–7)
RBC #/AREA URNS AUTO: 56 /HPF (ref 0–2)
SOURCE: ABNORMAL
SP GR UR STRIP: 1.03 (ref 1–1.03)
SQUAMOUS #/AREA URNS AUTO: 1 /HPF (ref 0–1)
UROBILINOGEN UR STRIP-MCNC: 0 MG/DL (ref 0–2)
WBC #/AREA URNS AUTO: 15 /HPF (ref 0–5)

## 2021-04-27 LAB
BACTERIA SPEC CULT: NORMAL
Lab: NORMAL
SPECIMEN SOURCE: NORMAL

## 2021-05-14 DIAGNOSIS — K59.01 SLOW TRANSIT CONSTIPATION: ICD-10-CM

## 2021-05-14 RX ORDER — BISACODYL 10 MG
SUPPOSITORY, RECTAL RECTAL
Qty: 10 SUPPOSITORY | Refills: 97 | Status: SHIPPED | OUTPATIENT
Start: 2021-05-14 | End: 2022-01-26

## 2021-05-17 DIAGNOSIS — F33.1 MODERATE EPISODE OF RECURRENT MAJOR DEPRESSIVE DISORDER (H): Primary | ICD-10-CM

## 2021-05-17 RX ORDER — MIRTAZAPINE 15 MG/1
TABLET, FILM COATED ORAL
Qty: 28 TABLET | Refills: 97 | Status: SHIPPED | OUTPATIENT
Start: 2021-05-17 | End: 2022-01-01

## 2021-05-21 DIAGNOSIS — H40.003 GLAUCOMA SUSPECT, BILATERAL: ICD-10-CM

## 2021-05-23 RX ORDER — BRINZOLAMIDE 10 MG/ML
SUSPENSION/ DROPS OPHTHALMIC
Qty: 10 ML | Refills: 97 | Status: SHIPPED | OUTPATIENT
Start: 2021-05-23 | End: 2022-01-01

## 2021-06-02 ENCOUNTER — ASSISTED LIVING VISIT (OUTPATIENT)
Dept: GERIATRICS | Facility: CLINIC | Age: 86
End: 2021-06-02
Payer: MEDICARE

## 2021-06-02 VITALS
BODY MASS INDEX: 18.55 KG/M2 | OXYGEN SATURATION: 94 % | HEART RATE: 76 BPM | TEMPERATURE: 98.3 F | DIASTOLIC BLOOD PRESSURE: 71 MMHG | SYSTOLIC BLOOD PRESSURE: 122 MMHG | WEIGHT: 122 LBS | RESPIRATION RATE: 20 BRPM

## 2021-06-02 DIAGNOSIS — M81.0 AGE-RELATED OSTEOPOROSIS WITHOUT CURRENT PATHOLOGICAL FRACTURE: ICD-10-CM

## 2021-06-02 DIAGNOSIS — F03.91 DEMENTIA WITH BEHAVIORAL DISTURBANCE, UNSPECIFIED DEMENTIA TYPE: ICD-10-CM

## 2021-06-02 DIAGNOSIS — F41.9 ANXIETY: ICD-10-CM

## 2021-06-02 DIAGNOSIS — I48.20 CHRONIC ATRIAL FIBRILLATION (H): Primary | ICD-10-CM

## 2021-06-02 DIAGNOSIS — E53.8 VITAMIN B 12 DEFICIENCY: ICD-10-CM

## 2021-06-02 DIAGNOSIS — K59.01 SLOW TRANSIT CONSTIPATION: ICD-10-CM

## 2021-06-02 NOTE — LETTER
"    6/2/2021        RE: Annabel May  [facility]  [address] [place] [id number]         Annabel May is a 87 year old female seen June 2, 2021 at [facility] Memory Care unit for one and a half years (admit 1/2020) seen to follow up progressive Alzheimer's dementia with anxious and agitative features.  She is seen on the unit, up to chair visiting with another resident      She is fretting a little, keeps asking \"Is that little boy safe now?\"   Hard to redirect from this topic, but then does say \"I'd like to go home.\"      By chart review, patient has had several years of worsening STML.   Over past year she has lost language and mobility, with falls reported.   She was hospitalized in January 2020 for dementia with agitation.   She no longer recognized her  and represented higher care needs than could be provided in AL.   She was given ceftriaxone for UTI with sensitive E coli 50-100k on UC, and bowel regimen increased for large amount of stool seen on abd CT.   She was seen by Psychiatry and quetiapine dose increased. Agitation improved some with these measures and she was discharged directly to Memory Care unit.    Pt was seen in the ED in September 2020 for paresthesias in her hands, unclear etiology on workup.   Denies any such symptoms today  .    Past Medical History:   Diagnosis Date     Arrhythmia     1AVB, wenckebach  Pacemaker placed 2015     Arthritis      Breast cancer (H)  S/p bilateral mastectomy      Hyperlipidaemia      Hypertension      Hypothyroid      Migraines      PONV (postoperative nausea and vomiting)      Syncope    Late onset dementia, Alzheimer's type  Anxiety    Past Surgical History:   Procedure Laterality Date     APPENDECTOMY       BREAST SURGERY      tee. profilactic mastectomy     CHOLECYSTECTOMY       ENT SURGERY      partial thyroidectomy     GLAUCOMA SURGERY Left 10/16/2017     GYN SURGERY      hystereectomy     HEAD & NECK SURGERY       HYSTERECTOMY       " PHACOEMULSIFICATION CLEAR CORNEA W/ STANDARD IOL IMPLANT, ENDOSCOPIC CYCLOPHOTOCOAGULATION, COMBINED  6/28/2012    Procedure: COMBINED PHACOEMULSIFICATION CLEAR CORNEA WITH STANDARD INTRAOCULAR LENS IMPLANT, ENDOSCOPIC CYCLOPHOTOCOAGULATION;  RIGHT COMBINED PHACOEMULSIFICATION CLEAR CORNEA WITH STANDARD INTRAOCULAR LENS IMPLANT, ENDOSCOPIC CYCLOPHOTOCOAGULATION ;  Surgeon: Vinay Duque MD;  Location: [facility]     SH:  Previously lived with her  at the [facility].   She has a son Kelvin who is a radiologist; daughter Manoj  Non smoker    REVIEW OF SYSTEMS: Unobtainable secondary to cognitive impairment.   Ambulatory with 4WW, +falls   Weight stable at 122-125 lbs    EXAM: NAD  /71   Pulse 76   Temp 98.3  F (36.8  C)   Resp 20   Wt 55.3 kg (122 lb)   SpO2 94%   BMI 18.55 kg/m     Neck supple without adenopathy  Lungs clear bilaterally with fair air movement  Heart RRR s1s2 with I/VI JESSICA  Abd soft, NT, no distention or guarding, +BS  Ext without edema  Neuro: limited verbal skills, no tremor or stiffness  Psych: affect anxious    Labs reviewed    ASSESSMENT/PLAN:  (R07.89) Discomfort of chest wall   (Z95.0) Cardiac pacemaker in situ  Comment: intermittent   Plan: Padded shirts to take the place of subcutaneous tissue   Scheduled acetaminophen bid for any associated discomfort  No further pacemaker checks per family, goals of care    (F41.9) Anxiety  Comment: longstanding, worsened by cognitive decline, disorientation and loss of coping skills.      Plan: increase mirtazapine to 15 mg/ at HS, trazodone 50 mg/HS and sertraline 50 mg/day.  Not a candidate for GDR given ongoing distressing anxiety        (F03.91) Dementia with behavioral disturbance, unspecified dementia type (H)  Comment: progressive cognitive decline with loss of language, coping skills, functional status.     Plan: [place] Memory Care unit for assist with meds, meals, activity, secure unit.     She has been on scheduled tid and  prn quetiapine 25 mg for agitation since seen by Psychiatry in the hospital.       (I48.20) Chronic atrial fibrillation  Comment:   Pulse Readings from Last 4 Encounters:   06/02/21 76   03/24/21 81   01/06/21 90   11/17/20 90      Plan: metoprolol 25 mg/day for VR control, and apixaban for stroke prophylaxis       (M81.0) Age-related osteoporosis without current pathological fracture  Comment: she was on alendronate 8-9 years    Plan: would add maintenance dose of vit D      (E03.9) Hypothyroidism, unspecified type  Comment:   TSH   Date Value Ref Range Status   12/23/2020 4.80 (H) 0.40 - 4.00 mU/L Final      Plan: levothyroxine 88 mcg/day, yearly TSH       (E53.8) Vitamin B 12 deficiency  Comment: on oral replacement   Plan: same     (K59.01) Slow transit constipation  Comment: needs continual monitoring    Plan: Senna, Miralax, prn supp     Electronically signed by:  Jennifer Agarwal MD             Sincerely,        Jennifer Agarwal MD

## 2021-06-06 NOTE — PROGRESS NOTES
" Annabel May is a 87 year old female seen June 2, 2021 at [facility] Memory Care unit for one and a half years (admit 1/2020) seen to follow up progressive Alzheimer's dementia with anxious and agitative features.  She is seen on the unit, up to chair visiting with another resident      She is fretting a little, keeps asking \"Is that little boy safe now?\"   Hard to redirect from this topic, but then does say \"I'd like to go home.\"      By chart review, patient has had several years of worsening STML.   Over past year she has lost language and mobility, with falls reported.   She was hospitalized in January 2020 for dementia with agitation.   She no longer recognized her  and represented higher care needs than could be provided in AL.   She was given ceftriaxone for UTI with sensitive E coli 50-100k on UC, and bowel regimen increased for large amount of stool seen on abd CT.   She was seen by Psychiatry and quetiapine dose increased. Agitation improved some with these measures and she was discharged directly to Memory Care unit.    Pt was seen in the ED in September 2020 for paresthesias in her hands, unclear etiology on workup.   Denies any such symptoms today  .    Past Medical History:   Diagnosis Date     Arrhythmia     1AVB, wenckebach  Pacemaker placed 2015     Arthritis      Breast cancer (H)  S/p bilateral mastectomy      Hyperlipidaemia      Hypertension      Hypothyroid      Migraines      PONV (postoperative nausea and vomiting)      Syncope    Late onset dementia, Alzheimer's type  Anxiety    Past Surgical History:   Procedure Laterality Date     APPENDECTOMY       BREAST SURGERY      tee. profilactic mastectomy     CHOLECYSTECTOMY       ENT SURGERY      partial thyroidectomy     GLAUCOMA SURGERY Left 10/16/2017     GYN SURGERY      hystereectomy     HEAD & NECK SURGERY       HYSTERECTOMY       PHACOEMULSIFICATION CLEAR CORNEA W/ STANDARD IOL IMPLANT, ENDOSCOPIC CYCLOPHOTOCOAGULATION, COMBINED  " 6/28/2012    Procedure: COMBINED PHACOEMULSIFICATION CLEAR CORNEA WITH STANDARD INTRAOCULAR LENS IMPLANT, ENDOSCOPIC CYCLOPHOTOCOAGULATION;  RIGHT COMBINED PHACOEMULSIFICATION CLEAR CORNEA WITH STANDARD INTRAOCULAR LENS IMPLANT, ENDOSCOPIC CYCLOPHOTOCOAGULATION ;  Surgeon: Vinay Duque MD;  Location: [facility]     SH:  Previously lived with her  at the [facility].   She has a son Kelvin who is a radiologist; daughter Manoj  Non smoker    REVIEW OF SYSTEMS: Unobtainable secondary to cognitive impairment.   Ambulatory with 4WW, +falls   Weight stable at 122-125 lbs    EXAM: NAD  /71   Pulse 76   Temp 98.3  F (36.8  C)   Resp 20   Wt 55.3 kg (122 lb)   SpO2 94%   BMI 18.55 kg/m     Neck supple without adenopathy  Lungs clear bilaterally with fair air movement  Heart RRR s1s2 with I/VI JESSICA  Abd soft, NT, no distention or guarding, +BS  Ext without edema  Neuro: limited verbal skills, no tremor or stiffness  Psych: affect anxious    Labs reviewed    ASSESSMENT/PLAN:  (R07.89) Discomfort of chest wall   (Z95.0) Cardiac pacemaker in situ  Comment: intermittent   Plan: Padded shirts to take the place of subcutaneous tissue   Scheduled acetaminophen bid for any associated discomfort  No further pacemaker checks per family, goals of care    (F41.9) Anxiety  Comment: longstanding, worsened by cognitive decline, disorientation and loss of coping skills.      Plan: increase mirtazapine to 15 mg/ at HS, trazodone 50 mg/HS and sertraline 50 mg/day.  Not a candidate for GDR given ongoing distressing anxiety        (F03.91) Dementia with behavioral disturbance, unspecified dementia type (H)  Comment: progressive cognitive decline with loss of language, coping skills, functional status.     Plan: [place] Memory Care unit for assist with meds, meals, activity, secure unit.     She has been on scheduled tid and prn quetiapine 25 mg for agitation since seen by Psychiatry in the hospital.       (I48.20) Chronic  atrial fibrillation  Comment:   Pulse Readings from Last 4 Encounters:   06/02/21 76   03/24/21 81   01/06/21 90   11/17/20 90      Plan: metoprolol 25 mg/day for VR control, and apixaban for stroke prophylaxis       (M81.0) Age-related osteoporosis without current pathological fracture  Comment: she was on alendronate 8-9 years    Plan: would add maintenance dose of vit D      (E03.9) Hypothyroidism, unspecified type  Comment:   TSH   Date Value Ref Range Status   12/23/2020 4.80 (H) 0.40 - 4.00 mU/L Final      Plan: levothyroxine 88 mcg/day, yearly TSH       (E53.8) Vitamin B 12 deficiency  Comment: on oral replacement   Plan: same     (K59.01) Slow transit constipation  Comment: needs continual monitoring    Plan: Senna, Miralax, prn supp     Electronically signed by:  Jennifer Agarwal MD

## 2021-06-11 DIAGNOSIS — R52 GENERALIZED PAIN: ICD-10-CM

## 2021-06-11 DIAGNOSIS — F03.91 DEMENTIA WITH BEHAVIORAL DISTURBANCE, UNSPECIFIED DEMENTIA TYPE: ICD-10-CM

## 2021-06-12 RX ORDER — DOCUSATE SODIUM 50MG AND SENNOSIDES 8.6MG 8.6; 5 MG/1; MG/1
TABLET, FILM COATED ORAL
Qty: 124 TABLET | Refills: 97 | Status: SHIPPED | OUTPATIENT
Start: 2021-06-12 | End: 2022-01-20

## 2021-06-12 RX ORDER — PSEUDOEPHED/ACETAMINOPH/DIPHEN 30MG-500MG
TABLET ORAL
Qty: 186 TABLET | Refills: 97 | Status: SHIPPED | OUTPATIENT
Start: 2021-06-12 | End: 2022-01-01

## 2021-07-09 DIAGNOSIS — F03.91 DEMENTIA WITH BEHAVIORAL DISTURBANCE, UNSPECIFIED DEMENTIA TYPE: Primary | ICD-10-CM

## 2021-07-12 RX ORDER — QUETIAPINE FUMARATE 25 MG/1
TABLET, FILM COATED ORAL
Qty: 84 TABLET | Status: SHIPPED | OUTPATIENT
Start: 2021-07-12 | End: 2022-01-19

## 2021-07-24 ENCOUNTER — LAB REQUISITION (OUTPATIENT)
Dept: LAB | Facility: CLINIC | Age: 86
End: 2021-07-24
Payer: MEDICARE

## 2021-07-24 DIAGNOSIS — R53.1 WEAKNESS: ICD-10-CM

## 2021-07-24 LAB
ALBUMIN UR-MCNC: 20 MG/DL
APPEARANCE UR: CLEAR
BILIRUB UR QL STRIP: NEGATIVE
COLOR UR AUTO: YELLOW
GLUCOSE UR STRIP-MCNC: NEGATIVE MG/DL
HGB UR QL STRIP: ABNORMAL
KETONES UR STRIP-MCNC: ABNORMAL MG/DL
LEUKOCYTE ESTERASE UR QL STRIP: ABNORMAL
MUCOUS THREADS #/AREA URNS LPF: PRESENT /LPF
NITRATE UR QL: NEGATIVE
PH UR STRIP: 6 [PH] (ref 5–7)
RBC URINE: 150 /HPF
SP GR UR STRIP: 1.03 (ref 1–1.03)
SQUAMOUS EPITHELIAL: <1 /HPF
UROBILINOGEN UR STRIP-MCNC: NORMAL MG/DL
WBC URINE: 4 /HPF

## 2021-07-24 PROCEDURE — 81001 URINALYSIS AUTO W/SCOPE: CPT | Mod: ORL | Performed by: NURSE PRACTITIONER

## 2021-07-24 PROCEDURE — P9604 ONE-WAY ALLOW PRORATED TRIP: HCPCS | Mod: ORL | Performed by: NURSE PRACTITIONER

## 2021-07-24 PROCEDURE — 87086 URINE CULTURE/COLONY COUNT: CPT | Mod: ORL | Performed by: NURSE PRACTITIONER

## 2021-07-25 LAB — BACTERIA UR CULT: NO GROWTH

## 2021-07-28 ENCOUNTER — ASSISTED LIVING VISIT (OUTPATIENT)
Dept: GERIATRICS | Facility: CLINIC | Age: 86
End: 2021-07-28
Payer: MEDICARE

## 2021-07-28 VITALS
SYSTOLIC BLOOD PRESSURE: 130 MMHG | RESPIRATION RATE: 18 BRPM | HEART RATE: 69 BPM | HEIGHT: 68 IN | DIASTOLIC BLOOD PRESSURE: 80 MMHG | BODY MASS INDEX: 19.76 KG/M2 | OXYGEN SATURATION: 96 % | TEMPERATURE: 97.8 F | WEIGHT: 130.4 LBS

## 2021-07-28 DIAGNOSIS — F33.1 MODERATE EPISODE OF RECURRENT MAJOR DEPRESSIVE DISORDER (H): ICD-10-CM

## 2021-07-28 DIAGNOSIS — M81.0 AGE-RELATED OSTEOPOROSIS WITHOUT CURRENT PATHOLOGICAL FRACTURE: ICD-10-CM

## 2021-07-28 DIAGNOSIS — F03.91 DEMENTIA WITH BEHAVIORAL DISTURBANCE, UNSPECIFIED DEMENTIA TYPE: ICD-10-CM

## 2021-07-28 DIAGNOSIS — F41.9 ANXIETY: ICD-10-CM

## 2021-07-28 DIAGNOSIS — K59.01 SLOW TRANSIT CONSTIPATION: Primary | ICD-10-CM

## 2021-07-28 RX ORDER — VITAMIN B COMPLEX
1 TABLET ORAL DAILY
Qty: 30 TABLET | Refills: 11 | Status: SHIPPED | OUTPATIENT
Start: 2021-07-28 | End: 2022-01-01

## 2021-07-28 ASSESSMENT — MIFFLIN-ST. JEOR: SCORE: 1074.99

## 2021-07-28 NOTE — LETTER
"    7/28/2021        RE: Annabel Pan On Annamaria  [address] [facility] [address]        [place] [facility]  [place] [facility] Record Number:  [id number]  Place of Service where encounter took place:  [facility] [[id number]]  Chief Complaint   Patient presents with     RECHECK       HPI:    Annabel May  is a 87 year old (5/12/1934), who is being seen today for an episodic care visit.  HPI information obtained from: facility chart records and [facility] chart review. Today's concern is:    Seen today for increase of agitation above baseline. Per facility notes:  Family reports they have been receiving calls from her all last week and today because she believes they are dead.  she is having angry outbursts toward staff and other residents that are at time difficult to redirect. episodes of crying and increased anxiety continue.    She is up walking around the unit. Is agreeable for exam. Hx of constipation, bowel program with Miralax and Senna and prn supp. She does have unformed stool in the toilet. Last abdominal x-ray was negative for impaction. Her room is 80 degrees and she says \"its not right\". Staff investigating. She was tested for UTI which showed no growth. Her abdomen at baseline is rounded, firm and distended but it does appear larger today. Questioning if she is having urinary retention (no bladder scan in ) and or larger fecal load in colon. No reported nausea or vomiting.    Past Medical and Surgical History reviewed in Epic today.    MEDICATIONS:    Current Outpatient Medications   Medication Sig Dispense Refill     ACETAMINOPHEN EXTRA STRENGTH 500 MG tablet TAKE TWO TABLETS (1000MG) BY MOUTH TWICE DAILY;& TAKE 1 TABLET BY MOUTH TWICE DAILY AS NEEDED FOR MILD PAIN 186 tablet 97     AZOPT 1 % ophthalmic suspension INSTILL ONE DROP IN EACH EYE TWICE DAILY 10 mL 97     bisacodyl (DULCOLAX) 10 MG suppository UNWRAP AND INSERT ONE SUPPOSITORY " "RECTALLY DAILY AS NEEDED FOR CONSTIPATION (GIVE IF NO BOWEL MOVEMENT FOR 3 DAYS) 10 suppository 97     cyanocobalamin (VITAMIN B-12) 100 MCG tablet TAKE 1 TABLET BY MOUTH ONCE DAILY 28 tablet PRN     ELIQUIS ANTICOAGULANT 2.5 MG tablet TAKE 1 TABLET BY MOUTH TWICE DAILY 56 tablet PRN     latanoprost (XALATAN) 0.005 % ophthalmic solution INSTILL ONE DROP IN EACH EYE AT BEDTIME 2.5 mL 97     levothyroxine (SYNTHROID/LEVOTHROID) 88 MCG tablet TAKE 1 TABLET BY MOUTH ONCE DAILY 29 tablet PRN     MELATONIN MAXIMUM STRENGTH 5 MG tablet TAKE 1 TABLET BY MOUTH AT BEDTIME 28 tablet PRN     metoprolol succinate ER (TOPROL-XL) 25 MG 24 hr tablet TAKE 1 TABLET BY MOUTH ONCE DAILY 28 tablet PRN     mirtazapine (REMERON) 15 MG tablet TAKE 1 TABLET BY MOUTH AT BEDTIME ***NOTE DOSAGE/STRENGTH*** 28 tablet 97     Multiple Vitamins-Minerals (PRESERVISION AREDS) CAPS TAKE 1 CAPSULE BY MOUTH TWICE DAILY 56 capsule PRN     polyethylene glycol (MIRALAX) 17 g packet Take 17 g by mouth daily And give 2nd dose (BID) on Mon/Wed/Friday. Hold for loose stools 17 g 11     QUEtiapine (SEROQUEL) 25 MG tablet TAKE 1 TABLET BY MOUTH THREE TIMES DAILY;& TAKE 1 TABLET BY MOUTH TWICE DAILY AS NEEDED 84 tablet PRN     QUEtiapine (SEROQUEL) 25 MG tablet Take 1 tablet (25 mg) by mouth 2 times daily as needed (agitation) 30 tablet 0     SENEXON-S 8.6-50 MG tablet TAKE TWO TABLETS BY MOUTH TWICE DAILY ( HOLD FOR LOOSE STOOL ) 124 tablet 97     sertraline (ZOLOFT) 25 MG tablet Take 2 tablets (50 mg) by mouth daily 28 tablet PRN     traZODone (DESYREL) 50 MG tablet TAKE 1 TABLET BY MOUTH AT BEDTIME 28 tablet PRN     Vitamin D3 (CHOLECALCIFEROL) 25 mcg (1000 units) tablet Take 1 tablet (25 mcg) by mouth daily 30 tablet 11     REVIEW OF SYSTEMS:  Limited secondary to cognitive impairment but today pt reports \"don't know\"    Objective:  /80   Pulse 69   Temp 97.8  F (36.6  C)   Resp 18   Ht 1.727 m (5' 8\")   Wt 59.1 kg (130 lb 6.4 oz)   SpO2 96%   " BMI 19.83 kg/m    Exam:  GENERAL APPEARANCE:  Alert, in no distress, anxious at baseline   ENT:  Mouth and posterior oropharynx normal, dry mucous membranes, normal hearing acuity  EYES:  EOM, conjunctivae, lids, pupils and irises normal  RESP:  respiratory effort and palpation of chest normal, lungs clear to auscultation   CV:  Palpation and auscultation of heart done , regular rate and rhythm, no murmur, rub, or gallop, no edema  ABDOMEN: hypoactive BS, abdominal area is rounded and slightly distended.firm   M/S:   Gait and station at baseline-ambulates w/o assistive device   SKIN:  Inspection of skin and subcutaneous tissue baseline to visualized areas. Cyst/nodule hard on index finger right hand  NEURO:   Cranial nerves 2-12 are normal tested and grossly at patient's baseline  PSYCH:  insight and judgement impaired, memory impaired     Labs:   CBC RESULTS: Recent Labs   Lab Test 11/25/20  0653 09/19/20  0704   WBC 5.4 7.3   RBC 4.02 4.16   HGB 13.3 13.9   HCT 41.4 42.5   * 102*   MCH 33.1* 33.4*   MCHC 32.1 32.7   RDW 14.2 14.3    177       Last Basic Metabolic Panel:  Recent Labs   Lab Test 11/25/20  0653 09/19/20  0704    139   POTASSIUM 3.8 3.8   CHLORIDE 111* 110*   AMERICO 8.8 9.3   CO2 26 24   BUN 21 14   CR 0.92 0.89   GLC 87 85       Liver Function Studies -   Recent Labs   Lab Test 01/06/20  1530 07/10/14  0000   PROTTOTAL 7.7 6.9   ALBUMIN 4.4  --    BILITOTAL 0.9 0.6   ALKPHOS 66 77   AST 31 19   ALT 29 10       TSH   Date Value Ref Range Status   12/23/2020 4.80 (H) 0.40 - 4.00 mU/L Final   11/25/2020 8.30 (H) 0.40 - 4.00 mU/L Final       No results found for: A1C    ASSESSMENT/PLAN:  (K59.01) Slow transit constipation  Comment: Hx of retained stool-questioning urinary retention?  Plan:  -Miralax, Senna S  -daily prn supp if needed  -abdominal x-ray     (F03.91) Dementia with behavioral disturbance, unspecified dementia type (H)  (primary encounter diagnosis)  (F33.1) Moderate episode  of recurrent major depressive disorder (H)  (F41.9) Anxiety  Comment: anxious at times   Plan:   Seroquel 25 mg po TID prn for agitation and twice daily prn (used 4 times-somewhat effective). Will change scheduled times from 8/4/8 to 12/4/8.  -Sertraline 50 mg po daily   -Mirtazapine 15 mg po daily at HS (consider splitting) this dose?    (M81.0) Age-related osteoporosis without current pathological fracture  Comment: prior use of alendronate 8-9 years  Plan:  -per MD start vitamin D supplement             Electronically signed by:  ZAFAR Bae CNP                 Sincerely,        ZAFAR Bae CNP

## 2021-07-28 NOTE — LETTER
"    7/28/2021        RE: Annabel Pan On Annamaria  [address] [facility] [address]        [place] [facility]  [place] Medical Record Number:  [id number]  Place of Service where encounter took place:  [facility] [[id number]]  Chief Complaint   Patient presents with     RECHECK       HPI:    Annabel May  is a 87 year old (5/12/1934), who is being seen today for an episodic care visit.  HPI information obtained from: facility chart records and [facility] chart review. Today's concern is:    Seen today for increase of agitation above baseline. Per facility notes:  Family reports they have been receiving calls from her all last week and today because she believes they are dead.  she is having angry outbursts toward staff and other residents that are at time difficult to redirect. episodes of crying and increased anxiety continue.    She is up walking around the unit. Is agreeable for exam. Hx of constipation, bowel program with Miralax and Senna and prn supp. She does have unformed stool in the toilet. Last abdominal x-ray was negative for impaction. Her room is 80 degrees and she says \"its not right\". Staff investigating. She was tested for UTI which showed no growth. Her abdomen at baseline is rounded, firm and distended but it does appear larger today. Questioning if she is having urinary retention (no bladder scan in ) and or larger fecal load in colon. No reported nausea or vomiting.    Past Medical and Surgical History reviewed in Epic today.    MEDICATIONS:    Current Outpatient Medications   Medication Sig Dispense Refill     ACETAMINOPHEN EXTRA STRENGTH 500 MG tablet TAKE TWO TABLETS (1000MG) BY MOUTH TWICE DAILY;& TAKE 1 TABLET BY MOUTH TWICE DAILY AS NEEDED FOR MILD PAIN 186 tablet 97     AZOPT 1 % ophthalmic suspension INSTILL ONE DROP IN EACH EYE TWICE DAILY 10 mL 97     bisacodyl (DULCOLAX) 10 MG suppository UNWRAP AND INSERT ONE SUPPOSITORY " "RECTALLY DAILY AS NEEDED FOR CONSTIPATION (GIVE IF NO BOWEL MOVEMENT FOR 3 DAYS) 10 suppository 97     cyanocobalamin (VITAMIN B-12) 100 MCG tablet TAKE 1 TABLET BY MOUTH ONCE DAILY 28 tablet PRN     ELIQUIS ANTICOAGULANT 2.5 MG tablet TAKE 1 TABLET BY MOUTH TWICE DAILY 56 tablet PRN     latanoprost (XALATAN) 0.005 % ophthalmic solution INSTILL ONE DROP IN EACH EYE AT BEDTIME 2.5 mL 97     levothyroxine (SYNTHROID/LEVOTHROID) 88 MCG tablet TAKE 1 TABLET BY MOUTH ONCE DAILY 29 tablet PRN     MELATONIN MAXIMUM STRENGTH 5 MG tablet TAKE 1 TABLET BY MOUTH AT BEDTIME 28 tablet PRN     metoprolol succinate ER (TOPROL-XL) 25 MG 24 hr tablet TAKE 1 TABLET BY MOUTH ONCE DAILY 28 tablet PRN     mirtazapine (REMERON) 15 MG tablet TAKE 1 TABLET BY MOUTH AT BEDTIME  28 tablet 97     Multiple Vitamins-Minerals (PRESERVISION AREDS) CAPS TAKE 1 CAPSULE BY MOUTH TWICE DAILY 56 capsule PRN     polyethylene glycol (MIRALAX) 17 g packet Take 17 g by mouth daily And give 2nd dose (BID) on Mon/Wed/Friday. Hold for loose stools 17 g 11     QUEtiapine (SEROQUEL) 25 MG tablet TAKE 1 TABLET BY MOUTH THREE TIMES DAILY;& TAKE 1 TABLET BY MOUTH TWICE DAILY AS NEEDED 84 tablet PRN     QUEtiapine (SEROQUEL) 25 MG tablet Take 1 tablet (25 mg) by mouth 2 times daily as needed (agitation) 30 tablet 0     SENEXON-S 8.6-50 MG tablet TAKE TWO TABLETS BY MOUTH TWICE DAILY ( HOLD FOR LOOSE STOOL ) 124 tablet 97     sertraline (ZOLOFT) 25 MG tablet Take 2 tablets (50 mg) by mouth daily 28 tablet PRN     traZODone (DESYREL) 50 MG tablet TAKE 1 TABLET BY MOUTH AT BEDTIME 28 tablet PRN     Vitamin D3 (CHOLECALCIFEROL) 25 mcg (1000 units) tablet Take 1 tablet (25 mcg) by mouth daily 30 tablet 11     REVIEW OF SYSTEMS:  Limited secondary to cognitive impairment but today pt reports \"don't know\"    Objective:  /80   Pulse 69   Temp 97.8  F (36.6  C)   Resp 18   Ht 1.727 m (5' 8\")   Wt 59.1 kg (130 lb 6.4 oz)   SpO2 96%   BMI 19.83 kg/m  "   Exam:  GENERAL APPEARANCE:  Alert, in no distress, anxious at baseline   ENT:  Mouth and posterior oropharynx normal, dry mucous membranes, normal hearing acuity  EYES:  EOM, conjunctivae, lids, pupils and irises normal  RESP:  respiratory effort and palpation of chest normal, lungs clear to auscultation   CV:  Palpation and auscultation of heart done , regular rate and rhythm, no murmur, rub, or gallop, no edema  ABDOMEN: hypoactive BS, abdominal area is rounded and slightly distended.firm   M/S:   Gait and station at baseline-ambulates w/o assistive device   SKIN:  Inspection of skin and subcutaneous tissue baseline to visualized areas. Cyst/nodule hard on index finger right hand  NEURO:   Cranial nerves 2-12 are normal tested and grossly at patient's baseline  PSYCH:  insight and judgement impaired, memory impaired     Labs:   CBC RESULTS: Recent Labs   Lab Test 11/25/20  0653 09/19/20  0704   WBC 5.4 7.3   RBC 4.02 4.16   HGB 13.3 13.9   HCT 41.4 42.5   * 102*   MCH 33.1* 33.4*   MCHC 32.1 32.7   RDW 14.2 14.3    177       Last Basic Metabolic Panel:  Recent Labs   Lab Test 11/25/20  0653 09/19/20  0704    139   POTASSIUM 3.8 3.8   CHLORIDE 111* 110*   AMERICO 8.8 9.3   CO2 26 24   BUN 21 14   CR 0.92 0.89   GLC 87 85       Liver Function Studies -   Recent Labs   Lab Test 01/06/20  1530 07/10/14  0000   PROTTOTAL 7.7 6.9   ALBUMIN 4.4  --    BILITOTAL 0.9 0.6   ALKPHOS 66 77   AST 31 19   ALT 29 10       TSH   Date Value Ref Range Status   12/23/2020 4.80 (H) 0.40 - 4.00 mU/L Final   11/25/2020 8.30 (H) 0.40 - 4.00 mU/L Final       No results found for: A1C    ASSESSMENT/PLAN:  (K59.01) Slow transit constipation  Comment: Hx of retained stool-questioning urinary retention?  Plan:  -Miralax, Senna S  -daily prn supp if needed  -abdominal x-ray     (F03.91) Dementia with behavioral disturbance, unspecified dementia type (H)  (primary encounter diagnosis)  (F33.1) Moderate episode of recurrent  major depressive disorder (H)  (F41.9) Anxiety  Comment: anxious at times   Plan:   Seroquel 25 mg po TID prn for agitation and twice daily prn (used 4 times-somewhat effective). Will change scheduled times from 8/4/8 to 12/4/8.  -Sertraline 50 mg po daily   -Mirtazapine 15 mg po daily at HS (consider splitting) this dose?    (M81.0) Age-related osteoporosis without current pathological fracture  Comment: prior use of alendronate 8-9 years  Plan:  -per MD start vitamin D supplement             Electronically signed by:  ZAFAR Bae CNP                 Sincerely,        ZAFAR Bae CNP

## 2021-07-28 NOTE — PROGRESS NOTES
"[place] [facility]  [place] [facility] Record Number:  [id number]  Place of Service where encounter took place:  [facility] [[id number]]  Chief Complaint   Patient presents with     RECHECK       HPI:    Annabel May  is a 87 year old (5/12/1934), who is being seen today for an episodic care visit.  HPI information obtained from: facility chart records and [facility] chart review. Today's concern is:    Seen today for increase of agitation above baseline. Per facility notes:  Family reports they have been receiving calls from her all last week and today because she believes they are dead.  she is having angry outbursts toward staff and other residents that are at time difficult to redirect. episodes of crying and increased anxiety continue.    She is up walking around the unit. Is agreeable for exam. Hx of constipation, bowel program with Miralax and Senna and prn supp. She does have unformed stool in the toilet. Last abdominal x-ray was negative for impaction. Her room is 80 degrees and she says \"its not right\". Staff investigating. She was tested for UTI which showed no growth. Her abdomen at baseline is rounded, firm and distended but it does appear larger today. Questioning if she is having urinary retention (no bladder scan in ) and or larger fecal load in colon. No reported nausea or vomiting.    Past Medical and Surgical History reviewed in Epic today.    MEDICATIONS:    Current Outpatient Medications   Medication Sig Dispense Refill     ACETAMINOPHEN EXTRA STRENGTH 500 MG tablet TAKE TWO TABLETS (1000MG) BY MOUTH TWICE DAILY;& TAKE 1 TABLET BY MOUTH TWICE DAILY AS NEEDED FOR MILD PAIN 186 tablet 97     AZOPT 1 % ophthalmic suspension INSTILL ONE DROP IN EACH EYE TWICE DAILY 10 mL 97     bisacodyl (DULCOLAX) 10 MG suppository UNWRAP AND INSERT ONE SUPPOSITORY RECTALLY DAILY AS NEEDED FOR CONSTIPATION (GIVE IF NO BOWEL MOVEMENT FOR 3 DAYS) 10 suppository 97     " "cyanocobalamin (VITAMIN B-12) 100 MCG tablet TAKE 1 TABLET BY MOUTH ONCE DAILY 28 tablet PRN     ELIQUIS ANTICOAGULANT 2.5 MG tablet TAKE 1 TABLET BY MOUTH TWICE DAILY 56 tablet PRN     latanoprost (XALATAN) 0.005 % ophthalmic solution INSTILL ONE DROP IN EACH EYE AT BEDTIME 2.5 mL 97     levothyroxine (SYNTHROID/LEVOTHROID) 88 MCG tablet TAKE 1 TABLET BY MOUTH ONCE DAILY 29 tablet PRN     MELATONIN MAXIMUM STRENGTH 5 MG tablet TAKE 1 TABLET BY MOUTH AT BEDTIME 28 tablet PRN     metoprolol succinate ER (TOPROL-XL) 25 MG 24 hr tablet TAKE 1 TABLET BY MOUTH ONCE DAILY 28 tablet PRN     mirtazapine (REMERON) 15 MG tablet TAKE 1 TABLET BY MOUTH AT BEDTIME  28 tablet 97     Multiple Vitamins-Minerals (PRESERVISION AREDS) CAPS TAKE 1 CAPSULE BY MOUTH TWICE DAILY 56 capsule PRN     polyethylene glycol (MIRALAX) 17 g packet Take 17 g by mouth daily And give 2nd dose (BID) on Mon/Wed/Friday. Hold for loose stools 17 g 11     QUEtiapine (SEROQUEL) 25 MG tablet TAKE 1 TABLET BY MOUTH THREE TIMES DAILY;& TAKE 1 TABLET BY MOUTH TWICE DAILY AS NEEDED 84 tablet PRN     QUEtiapine (SEROQUEL) 25 MG tablet Take 1 tablet (25 mg) by mouth 2 times daily as needed (agitation) 30 tablet 0     SENEXON-S 8.6-50 MG tablet TAKE TWO TABLETS BY MOUTH TWICE DAILY ( HOLD FOR LOOSE STOOL ) 124 tablet 97     sertraline (ZOLOFT) 25 MG tablet Take 2 tablets (50 mg) by mouth daily 28 tablet PRN     traZODone (DESYREL) 50 MG tablet TAKE 1 TABLET BY MOUTH AT BEDTIME 28 tablet PRN     Vitamin D3 (CHOLECALCIFEROL) 25 mcg (1000 units) tablet Take 1 tablet (25 mcg) by mouth daily 30 tablet 11     REVIEW OF SYSTEMS:  Limited secondary to cognitive impairment but today pt reports \"don't know\"    Objective:  /80   Pulse 69   Temp 97.8  F (36.6  C)   Resp 18   Ht 1.727 m (5' 8\")   Wt 59.1 kg (130 lb 6.4 oz)   SpO2 96%   BMI 19.83 kg/m    Exam:  GENERAL APPEARANCE:  Alert, in no distress, anxious at baseline   ENT:  Mouth and posterior oropharynx " normal, dry mucous membranes, normal hearing acuity  EYES:  EOM, conjunctivae, lids, pupils and irises normal  RESP:  respiratory effort and palpation of chest normal, lungs clear to auscultation   CV:  Palpation and auscultation of heart done , regular rate and rhythm, no murmur, rub, or gallop, no edema  ABDOMEN: hypoactive BS, abdominal area is rounded and slightly distended.firm   M/S:   Gait and station at baseline-ambulates w/o assistive device   SKIN:  Inspection of skin and subcutaneous tissue baseline to visualized areas. Cyst/nodule hard on index finger right hand  NEURO:   Cranial nerves 2-12 are normal tested and grossly at patient's baseline  PSYCH:  insight and judgement impaired, memory impaired     Labs:   CBC RESULTS: Recent Labs   Lab Test 11/25/20  0653 09/19/20  0704   WBC 5.4 7.3   RBC 4.02 4.16   HGB 13.3 13.9   HCT 41.4 42.5   * 102*   MCH 33.1* 33.4*   MCHC 32.1 32.7   RDW 14.2 14.3    177       Last Basic Metabolic Panel:  Recent Labs   Lab Test 11/25/20  0653 09/19/20  0704    139   POTASSIUM 3.8 3.8   CHLORIDE 111* 110*   AMERICO 8.8 9.3   CO2 26 24   BUN 21 14   CR 0.92 0.89   GLC 87 85       Liver Function Studies -   Recent Labs   Lab Test 01/06/20  1530 07/10/14  0000   PROTTOTAL 7.7 6.9   ALBUMIN 4.4  --    BILITOTAL 0.9 0.6   ALKPHOS 66 77   AST 31 19   ALT 29 10       TSH   Date Value Ref Range Status   12/23/2020 4.80 (H) 0.40 - 4.00 mU/L Final   11/25/2020 8.30 (H) 0.40 - 4.00 mU/L Final       No results found for: A1C    ASSESSMENT/PLAN:  (K59.01) Slow transit constipation  Comment: Hx of retained stool-questioning urinary retention?  Plan:  -Miralax, Senna S  -daily prn supp if needed  -abdominal x-ray     (F03.91) Dementia with behavioral disturbance, unspecified dementia type (H)  (primary encounter diagnosis)  (F33.1) Moderate episode of recurrent major depressive disorder (H)  (F41.9) Anxiety  Comment: anxious at times   Plan:   Seroquel 25 mg po TID prn for  agitation and twice daily prn (used 4 times-somewhat effective). Will change scheduled times from 8/4/8 to 12/4/8.  -Sertraline 50 mg po daily   -Mirtazapine 15 mg po daily at HS (consider splitting) this dose?    (M81.0) Age-related osteoporosis without current pathological fracture  Comment: prior use of alendronate 8-9 years  Plan:  -per MD start vitamin D supplement             Electronically signed by:  ZAFAR Bae CNP

## 2021-07-29 ENCOUNTER — TRANSFERRED RECORDS (OUTPATIENT)
Dept: HEALTH INFORMATION MANAGEMENT | Facility: CLINIC | Age: 86
End: 2021-07-29

## 2021-07-30 ENCOUNTER — LAB REQUISITION (OUTPATIENT)
Dept: LAB | Facility: CLINIC | Age: 86
End: 2021-07-30
Payer: MEDICARE

## 2021-07-30 DIAGNOSIS — U07.1 COVID-19: ICD-10-CM

## 2021-07-30 PROCEDURE — U0005 INFEC AGEN DETEC AMPLI PROBE: HCPCS | Mod: ORL | Performed by: FAMILY MEDICINE

## 2021-07-31 LAB — SARS-COV-2 RNA RESP QL NAA+PROBE: NEGATIVE

## 2021-08-05 ENCOUNTER — LAB REQUISITION (OUTPATIENT)
Dept: LAB | Facility: CLINIC | Age: 86
End: 2021-08-05
Payer: MEDICARE

## 2021-08-05 DIAGNOSIS — U07.1 COVID-19: ICD-10-CM

## 2021-08-06 LAB — SARS-COV-2 RNA RESP QL NAA+PROBE: NEGATIVE

## 2021-08-06 PROCEDURE — U0005 INFEC AGEN DETEC AMPLI PROBE: HCPCS | Mod: ORL | Performed by: FAMILY MEDICINE

## 2021-08-12 ENCOUNTER — LAB REQUISITION (OUTPATIENT)
Dept: LAB | Facility: CLINIC | Age: 86
End: 2021-08-12
Payer: MEDICARE

## 2021-08-12 DIAGNOSIS — U07.1 COVID-19: ICD-10-CM

## 2021-08-13 PROCEDURE — U0003 INFECTIOUS AGENT DETECTION BY NUCLEIC ACID (DNA OR RNA); SEVERE ACUTE RESPIRATORY SYNDROME CORONAVIRUS 2 (SARS-COV-2) (CORONAVIRUS DISEASE [COVID-19]), AMPLIFIED PROBE TECHNIQUE, MAKING USE OF HIGH THROUGHPUT TECHNOLOGIES AS DESCRIBED BY CMS-2020-01-R: HCPCS | Mod: ORL | Performed by: FAMILY MEDICINE

## 2021-08-14 LAB — SARS-COV-2 RNA RESP QL NAA+PROBE: NEGATIVE

## 2021-10-08 DIAGNOSIS — H40.1133 PRIMARY OPEN ANGLE GLAUCOMA OF BOTH EYES, SEVERE STAGE: ICD-10-CM

## 2021-10-08 RX ORDER — LATANOPROST 50 UG/ML
SOLUTION/ DROPS OPHTHALMIC
Qty: 2.5 ML | Refills: 97 | Status: SHIPPED | OUTPATIENT
Start: 2021-10-08

## 2021-10-20 ENCOUNTER — ASSISTED LIVING VISIT (OUTPATIENT)
Dept: GERIATRICS | Facility: CLINIC | Age: 86
End: 2021-10-20
Payer: MEDICARE

## 2021-10-20 ENCOUNTER — LAB REQUISITION (OUTPATIENT)
Dept: LAB | Facility: CLINIC | Age: 86
End: 2021-10-20
Payer: MEDICARE

## 2021-10-20 DIAGNOSIS — F03.91 DEMENTIA WITH BEHAVIORAL DISTURBANCE, UNSPECIFIED DEMENTIA TYPE: ICD-10-CM

## 2021-10-20 DIAGNOSIS — F41.9 ANXIETY: ICD-10-CM

## 2021-10-20 DIAGNOSIS — K59.01 SLOW TRANSIT CONSTIPATION: Primary | ICD-10-CM

## 2021-10-20 DIAGNOSIS — E03.9 HYPOTHYROIDISM, UNSPECIFIED: ICD-10-CM

## 2021-10-20 DIAGNOSIS — I10 BENIGN ESSENTIAL HYPERTENSION: ICD-10-CM

## 2021-10-20 DIAGNOSIS — F33.1 MODERATE EPISODE OF RECURRENT MAJOR DEPRESSIVE DISORDER (H): ICD-10-CM

## 2021-10-20 DIAGNOSIS — I10 ESSENTIAL (PRIMARY) HYPERTENSION: ICD-10-CM

## 2021-10-20 ASSESSMENT — MIFFLIN-ST. JEOR: SCORE: 1029.63

## 2021-10-20 NOTE — LETTER
10/20/2021        RE: Annabel Pan On [place]namaria  [address] [facility] [facility]  [place] [address]        [facility]  [facility] Record Number:  [id number]  Place of Service where encounter took place:  [facility] [[id number]]  Chief Complaint   Patient presents with     RECHECK     Routine       HPI:    Annabel May  is a 87 year old (5/12/1934), who is being seen today for an episodic care visit.  HPI information obtained from: facility chart records, facility staff and [facility] chart review. Today's concern is:    Seen today for recheck of chronic conditions. Per staff continues to alternate between constipation and diarrhea. Currently with loose stool they are going to hold senna S for the next 2 days. Facility chart reviewed BP elevated today and could be 2/2 anxiety, weight stable. With ongoing anxiety/agitation on scheduled and prn Seroquel of which prn's used 6x  This month.     Past Medical and Surgical History reviewed in Epic today.    MEDICATIONS:    Current Outpatient Medications   Medication Sig Dispense Refill     ACETAMINOPHEN EXTRA STRENGTH 500 MG tablet TAKE TWO TABLETS (1000MG) BY MOUTH TWICE DAILY;& TAKE 1 TABLET BY MOUTH TWICE DAILY AS NEEDED FOR MILD PAIN 186 tablet 97     AZOPT 1 % ophthalmic suspension INSTILL ONE DROP IN EACH EYE TWICE DAILY 10 mL 97     bisacodyl (DULCOLAX) 10 MG suppository UNWRAP AND INSERT ONE SUPPOSITORY RECTALLY DAILY AS NEEDED FOR CONSTIPATION (GIVE IF NO BOWEL MOVEMENT FOR 3 DAYS) 10 suppository 97     cyanocobalamin (VITAMIN B-12) 100 MCG tablet TAKE 1 TABLET BY MOUTH ONCE DAILY 28 tablet PRN     ELIQUIS ANTICOAGULANT 2.5 MG tablet TAKE 1 TABLET BY MOUTH TWICE DAILY 56 tablet PRN     latanoprost (XALATAN) 0.005 % ophthalmic solution INSTILL ONE DROP IN EACH EYE AT BEDTIME 2.5 mL 97     levothyroxine (SYNTHROID/LEVOTHROID) 88 MCG tablet TAKE 1 TABLET BY MOUTH ONCE DAILY 29 tablet PRN     MELATONIN MAXIMUM  "STRENGTH 5 MG tablet TAKE 1 TABLET BY MOUTH AT BEDTIME 28 tablet PRN     metoprolol succinate ER (TOPROL-XL) 25 MG 24 hr tablet TAKE 1 TABLET BY MOUTH ONCE DAILY 28 tablet PRN     mirtazapine (REMERON) 15 MG tablet TAKE 1 TABLET BY MOUTH AT BEDTIME  28 tablet 97     Multiple Vitamins-Minerals (PRESERVISION AREDS) CAPS TAKE 1 CAPSULE BY MOUTH TWICE DAILY 56 capsule PRN     polyethylene glycol (MIRALAX) 17 g packet Take 17 g by mouth daily And give 2nd dose (BID) on Mon/Wed/Friday. Hold for loose stools 17 g 11     QUEtiapine (SEROQUEL) 25 MG tablet TAKE 1 TABLET BY MOUTH THREE TIMES DAILY;& TAKE 1 TABLET BY MOUTH TWICE DAILY AS NEEDED 84 tablet PRN     QUEtiapine (SEROQUEL) 25 MG tablet Take 1 tablet (25 mg) by mouth 2 times daily as needed (agitation) 30 tablet 0     SENEXON-S 8.6-50 MG tablet TAKE TWO TABLETS BY MOUTH TWICE DAILY ( HOLD FOR LOOSE STOOL ) 124 tablet 97     sertraline (ZOLOFT) 25 MG tablet Take 2 tablets (50 mg) by mouth daily 28 tablet PRN     traZODone (DESYREL) 50 MG tablet TAKE 1 TABLET BY MOUTH AT BEDTIME 28 tablet PRN     Vitamin D3 (CHOLECALCIFEROL) 25 mcg (1000 units) tablet Take 1 tablet (25 mcg) by mouth daily 30 tablet 11     REVIEW OF SYSTEMS:  Limited secondary to cognitive impairment but today pt reports ok    Objective:  BP (!) 140/95   Pulse 72   Temp 98.1  F (36.7  C)   Resp 18   Ht 1.727 m (5' 8\")   Wt 54.6 kg (120 lb 6.4 oz)   SpO2 94%   BMI 18.31 kg/m    Exam:  GENERAL APPEARANCE:  Alert, in no distress, anxious at baseline   ENT:  Mouth and posterior oropharynx normal, dry mucous membranes, normal hearing acuity  EYES: lids, pupils and irises normal  RESP:  respiratory effort and palpation of chest normal, lungs clear to auscultation   CV:  Palpation and auscultation of heart done , regular rate and rhythm, no murmur, rub, or gallop, no edema  ABDOMEN: hypoactive BS, abdominal area is rounded and slightly distended.firm   M/S:   Gait and station at baseline-ambulates w/o " assistive device   SKIN:  Inspection of skin and subcutaneous tissue baseline to visualized areas. Cyst/nodule hard on index finger right hand  NEURO:   Cranial nerves 2-12 are normal tested and grossly at patient's baseline  PSYCH:  insight and judgement impaired, memory impaired     Labs:   CBC RESULTS: Recent Labs   Lab Test 11/25/20  0653 09/19/20  0704   WBC 5.4 7.3   RBC 4.02 4.16   HGB 13.3 13.9   HCT 41.4 42.5   * 102*   MCH 33.1* 33.4*   MCHC 32.1 32.7   RDW 14.2 14.3    177       Last Basic Metabolic Panel:  Recent Labs   Lab Test 11/25/20  0653 09/19/20  0704    139   POTASSIUM 3.8 3.8   CHLORIDE 111* 110*   AMERICO 8.8 9.3   CO2 26 24   BUN 21 14   CR 0.92 0.89   GLC 87 85       Liver Function Studies -   Recent Labs   Lab Test 01/06/20  1530 07/10/14  0000   PROTTOTAL 7.7 6.9   ALBUMIN 4.4  --    BILITOTAL 0.9 0.6   ALKPHOS 66 77   AST 31 19   ALT 29 10       TSH   Date Value Ref Range Status   10/21/2021 3.99 0.40 - 4.00 mU/L Final   12/23/2020 4.80 (H) 0.40 - 4.00 mU/L Final   11/25/2020 8.30 (H) 0.40 - 4.00 mU/L Final       No results found for: A1C    ASSESSMENT/PLAN:  (K59.01) Slow transit constipation  Comment: Hx of retained stool-questioning urinary retention? Now with loose stools   Plan:  -Miralax, Senna S  -daily prn supp if needed  -abdominal x-ray negative at prior visit      (F03.91) Dementia with behavioral disturbance, unspecified dementia type (H)  (primary encounter diagnosis)  (F33.1) Moderate episode of recurrent major depressive disorder (H)  (F41.9) Anxiety  Comment: anxious at times   Plan:   Seroquel 25 mg po TID prn for agitation and twice daily prn (used 6 times-somewhat effective). change scheduled times from 8/4/8 to 12/4/8 at prior visit and prn use from 4--> 6 times  -Sertraline 50 mg po daily   -Mirtazapine 15 mg po daily at HS (consider splitting) this dose? Concern for sedating affects at lower dose and fall risk    (I10) Benign essential  hypertension  Comment: slightly elevated today   Plan:  -continue current plan of care and monitor with visits         Electronically signed by:  ZAFAR Bae CNP                 Sincerely,        ZAFAR Bae CNP

## 2021-10-20 NOTE — LETTER
Annabel May 5/12/1934    1. BMP,CBC, LFTs on lab day next week for HTN  2. TSH on lab day next week for hypothyroidism     Reese Morfin, ZAFAR CNP on 10/20/2021 at 2:53 PM

## 2021-10-20 NOTE — PROGRESS NOTES
[facility]  [facility] Record Number:  [id number]  Place of Service where encounter took place:  [facility] [[id number]]  Chief Complaint   Patient presents with     RECHECK     Routine       HPI:    Annabel May  is a 87 year old (5/12/1934), who is being seen today for an episodic care visit.  HPI information obtained from: facility chart records, facility staff and [facility] chart review. Today's concern is:    Seen today for recheck of chronic conditions. Per staff continues to alternate between constipation and diarrhea. Currently with loose stool they are going to hold senna S for the next 2 days. Facility chart reviewed BP elevated today and could be 2/2 anxiety, weight stable. With ongoing anxiety/agitation on scheduled and prn Seroquel of which prn's used 6x  This month.     Past Medical and Surgical History reviewed in Epic today.    MEDICATIONS:    Current Outpatient Medications   Medication Sig Dispense Refill     ACETAMINOPHEN EXTRA STRENGTH 500 MG tablet TAKE TWO TABLETS (1000MG) BY MOUTH TWICE DAILY;& TAKE 1 TABLET BY MOUTH TWICE DAILY AS NEEDED FOR MILD PAIN 186 tablet 97     AZOPT 1 % ophthalmic suspension INSTILL ONE DROP IN EACH EYE TWICE DAILY 10 mL 97     bisacodyl (DULCOLAX) 10 MG suppository UNWRAP AND INSERT ONE SUPPOSITORY RECTALLY DAILY AS NEEDED FOR CONSTIPATION (GIVE IF NO BOWEL MOVEMENT FOR 3 DAYS) 10 suppository 97     cyanocobalamin (VITAMIN B-12) 100 MCG tablet TAKE 1 TABLET BY MOUTH ONCE DAILY 28 tablet PRN     ELIQUIS ANTICOAGULANT 2.5 MG tablet TAKE 1 TABLET BY MOUTH TWICE DAILY 56 tablet PRN     latanoprost (XALATAN) 0.005 % ophthalmic solution INSTILL ONE DROP IN EACH EYE AT BEDTIME 2.5 mL 97     levothyroxine (SYNTHROID/LEVOTHROID) 88 MCG tablet TAKE 1 TABLET BY MOUTH ONCE DAILY 29 tablet PRN     MELATONIN MAXIMUM STRENGTH 5 MG tablet TAKE 1 TABLET BY MOUTH AT BEDTIME 28 tablet PRN     metoprolol succinate ER  "(TOPROL-XL) 25 MG 24 hr tablet TAKE 1 TABLET BY MOUTH ONCE DAILY 28 tablet PRN     mirtazapine (REMERON) 15 MG tablet TAKE 1 TABLET BY MOUTH AT BEDTIME  28 tablet 97     Multiple Vitamins-Minerals (PRESERVISION AREDS) CAPS TAKE 1 CAPSULE BY MOUTH TWICE DAILY 56 capsule PRN     polyethylene glycol (MIRALAX) 17 g packet Take 17 g by mouth daily And give 2nd dose (BID) on Mon/Wed/Friday. Hold for loose stools 17 g 11     QUEtiapine (SEROQUEL) 25 MG tablet TAKE 1 TABLET BY MOUTH THREE TIMES DAILY;& TAKE 1 TABLET BY MOUTH TWICE DAILY AS NEEDED 84 tablet PRN     QUEtiapine (SEROQUEL) 25 MG tablet Take 1 tablet (25 mg) by mouth 2 times daily as needed (agitation) 30 tablet 0     SENEXON-S 8.6-50 MG tablet TAKE TWO TABLETS BY MOUTH TWICE DAILY ( HOLD FOR LOOSE STOOL ) 124 tablet 97     sertraline (ZOLOFT) 25 MG tablet Take 2 tablets (50 mg) by mouth daily 28 tablet PRN     traZODone (DESYREL) 50 MG tablet TAKE 1 TABLET BY MOUTH AT BEDTIME 28 tablet PRN     Vitamin D3 (CHOLECALCIFEROL) 25 mcg (1000 units) tablet Take 1 tablet (25 mcg) by mouth daily 30 tablet 11     REVIEW OF SYSTEMS:  Limited secondary to cognitive impairment but today pt reports ok    Objective:  BP (!) 140/95   Pulse 72   Temp 98.1  F (36.7  C)   Resp 18   Ht 1.727 m (5' 8\")   Wt 54.6 kg (120 lb 6.4 oz)   SpO2 94%   BMI 18.31 kg/m    Exam:  GENERAL APPEARANCE:  Alert, in no distress, anxious at baseline   ENT:  Mouth and posterior oropharynx normal, dry mucous membranes, normal hearing acuity  EYES: lids, pupils and irises normal  RESP:  respiratory effort and palpation of chest normal, lungs clear to auscultation   CV:  Palpation and auscultation of heart done , regular rate and rhythm, no murmur, rub, or gallop, no edema  ABDOMEN: hypoactive BS, abdominal area is rounded and slightly distended.firm   M/S:   Gait and station at baseline-ambulates w/o assistive device   SKIN:  Inspection of skin and subcutaneous tissue baseline to visualized areas. " Cyst/nodule hard on index finger right hand  NEURO:   Cranial nerves 2-12 are normal tested and grossly at patient's baseline  PSYCH:  insight and judgement impaired, memory impaired     Labs:   CBC RESULTS: Recent Labs   Lab Test 11/25/20  0653 09/19/20  0704   WBC 5.4 7.3   RBC 4.02 4.16   HGB 13.3 13.9   HCT 41.4 42.5   * 102*   MCH 33.1* 33.4*   MCHC 32.1 32.7   RDW 14.2 14.3    177       Last Basic Metabolic Panel:  Recent Labs   Lab Test 11/25/20  0653 09/19/20  0704    139   POTASSIUM 3.8 3.8   CHLORIDE 111* 110*   AMERICO 8.8 9.3   CO2 26 24   BUN 21 14   CR 0.92 0.89   GLC 87 85       Liver Function Studies -   Recent Labs   Lab Test 01/06/20  1530 07/10/14  0000   PROTTOTAL 7.7 6.9   ALBUMIN 4.4  --    BILITOTAL 0.9 0.6   ALKPHOS 66 77   AST 31 19   ALT 29 10       TSH   Date Value Ref Range Status   10/21/2021 3.99 0.40 - 4.00 mU/L Final   12/23/2020 4.80 (H) 0.40 - 4.00 mU/L Final   11/25/2020 8.30 (H) 0.40 - 4.00 mU/L Final       No results found for: A1C    ASSESSMENT/PLAN:  (K59.01) Slow transit constipation  Comment: Hx of retained stool-questioning urinary retention? Now with loose stools   Plan:  -Miralax, Senna S  -daily prn supp if needed  -abdominal x-ray negative at prior visit      (F03.91) Dementia with behavioral disturbance, unspecified dementia type (H)  (primary encounter diagnosis)  (F33.1) Moderate episode of recurrent major depressive disorder (H)  (F41.9) Anxiety  Comment: anxious at times   Plan:   Seroquel 25 mg po TID prn for agitation and twice daily prn (used 6 times-somewhat effective). change scheduled times from 8/4/8 to 12/4/8 at prior visit and prn use from 4--> 6 times  -Sertraline 50 mg po daily   -Mirtazapine 15 mg po daily at HS (consider splitting) this dose? Concern for sedating affects at lower dose and fall risk    (I10) Benign essential hypertension  Comment: slightly elevated today   Plan:  -continue current plan of care and monitor with visits          Electronically signed by:  ZAFAR Bae CNP

## 2021-10-21 VITALS
TEMPERATURE: 98.1 F | DIASTOLIC BLOOD PRESSURE: 95 MMHG | BODY MASS INDEX: 18.25 KG/M2 | OXYGEN SATURATION: 94 % | SYSTOLIC BLOOD PRESSURE: 140 MMHG | HEART RATE: 72 BPM | WEIGHT: 120.4 LBS | RESPIRATION RATE: 18 BRPM | HEIGHT: 68 IN

## 2021-10-21 LAB
ALBUMIN SERPL-MCNC: 3.2 G/DL (ref 3.4–5)
ALP SERPL-CCNC: 63 U/L (ref 40–150)
ALT SERPL W P-5'-P-CCNC: 12 U/L (ref 0–50)
ANION GAP SERPL CALCULATED.3IONS-SCNC: 6 MMOL/L (ref 3–14)
AST SERPL W P-5'-P-CCNC: 15 U/L (ref 0–45)
BASOPHILS # BLD AUTO: 0 10E3/UL (ref 0–0.2)
BASOPHILS NFR BLD AUTO: 0 %
BILIRUB DIRECT SERPL-MCNC: 0.1 MG/DL (ref 0–0.2)
BILIRUB SERPL-MCNC: 0.4 MG/DL (ref 0.2–1.3)
BUN SERPL-MCNC: 18 MG/DL (ref 7–30)
CALCIUM SERPL-MCNC: 8.2 MG/DL (ref 8.5–10.1)
CHLORIDE BLD-SCNC: 115 MMOL/L (ref 94–109)
CO2 SERPL-SCNC: 24 MMOL/L (ref 20–32)
CREAT SERPL-MCNC: 0.94 MG/DL (ref 0.52–1.04)
EOSINOPHIL # BLD AUTO: 0.1 10E3/UL (ref 0–0.7)
EOSINOPHIL NFR BLD AUTO: 3 %
ERYTHROCYTE [DISTWIDTH] IN BLOOD BY AUTOMATED COUNT: 13.5 % (ref 10–15)
GFR SERPL CREATININE-BSD FRML MDRD: 55 ML/MIN/1.73M2
GLUCOSE BLD-MCNC: 80 MG/DL (ref 70–99)
HCT VFR BLD AUTO: 36.6 % (ref 35–47)
HGB BLD-MCNC: 11.3 G/DL (ref 11.7–15.7)
IMM GRANULOCYTES # BLD: 0 10E3/UL
IMM GRANULOCYTES NFR BLD: 0 %
LYMPHOCYTES # BLD AUTO: 1.6 10E3/UL (ref 0.8–5.3)
LYMPHOCYTES NFR BLD AUTO: 36 %
MCH RBC QN AUTO: 32.6 PG (ref 26.5–33)
MCHC RBC AUTO-ENTMCNC: 30.9 G/DL (ref 31.5–36.5)
MCV RBC AUTO: 106 FL (ref 78–100)
MONOCYTES # BLD AUTO: 0.4 10E3/UL (ref 0–1.3)
MONOCYTES NFR BLD AUTO: 8 %
NEUTROPHILS # BLD AUTO: 2.3 10E3/UL (ref 1.6–8.3)
NEUTROPHILS NFR BLD AUTO: 53 %
NRBC # BLD AUTO: 0 10E3/UL
NRBC BLD AUTO-RTO: 0 /100
PLATELET # BLD AUTO: 149 10E3/UL (ref 150–450)
POTASSIUM BLD-SCNC: 4.2 MMOL/L (ref 3.4–5.3)
PROT SERPL-MCNC: 6.1 G/DL (ref 6.8–8.8)
RBC # BLD AUTO: 3.47 10E6/UL (ref 3.8–5.2)
SODIUM SERPL-SCNC: 145 MMOL/L (ref 133–144)
TSH SERPL DL<=0.005 MIU/L-ACNC: 3.99 MU/L (ref 0.4–4)
WBC # BLD AUTO: 4.5 10E3/UL (ref 4–11)

## 2021-10-21 PROCEDURE — 36415 COLL VENOUS BLD VENIPUNCTURE: CPT | Mod: ORL | Performed by: NURSE PRACTITIONER

## 2021-10-21 PROCEDURE — P9603 ONE-WAY ALLOW PRORATED MILES: HCPCS | Mod: ORL | Performed by: NURSE PRACTITIONER

## 2021-10-21 PROCEDURE — 85025 COMPLETE CBC W/AUTO DIFF WBC: CPT | Mod: ORL | Performed by: NURSE PRACTITIONER

## 2021-10-21 PROCEDURE — 80053 COMPREHEN METABOLIC PANEL: CPT | Mod: ORL | Performed by: NURSE PRACTITIONER

## 2021-10-21 PROCEDURE — 82248 BILIRUBIN DIRECT: CPT | Mod: ORL | Performed by: NURSE PRACTITIONER

## 2021-10-21 PROCEDURE — 84443 ASSAY THYROID STIM HORMONE: CPT | Mod: ORL | Performed by: NURSE PRACTITIONER

## 2021-11-09 DIAGNOSIS — K59.01 SLOW TRANSIT CONSTIPATION: ICD-10-CM

## 2021-11-09 RX ORDER — POLYETHYLENE GLYCOL 3350 17 G/17G
POWDER, FOR SOLUTION ORAL
Qty: 510 G | Refills: 97 | Status: SHIPPED | OUTPATIENT
Start: 2021-11-09 | End: 2022-01-20

## 2021-11-12 NOTE — Clinical Note
Arsenio Leigh,  I think you already wrote the Bridge orders - but here is the note, FYMARIA DE JESUS!  Thanks! 53 yo M with PMH of DM, HTN, CHF, b/l LE swelling, Obesity(BMI 49), h/o diverticulitis/diverticular abscess s/p left colostomy 07/2021 at [facility] was seen in the ED for abdominal pain. Pt reports he is suffering from this colostomy bag and wants it reversed. Denies fever or chills, n/v/d/c. Pt reports the colostomy is functional.    POD#1, Open resection, sigmoid colon  Mobilization of splenic flexure   Open loop ileostomy   Reanastomosis, colon, with reversal of Kamilla pouch   Repair of colovesical fistula   Evacuation of pelvic abscess   Cystoscopy with insertion of ureteral stent in adult  bilateral, external     Vital Signs Last 24 Hrs  T(C): 36.1 (12 Nov 2021 09:35), Max: 36.7 (11 Nov 2021 16:31)  T(F): 97 (12 Nov 2021 09:35), Max: 98.1 (11 Nov 2021 16:31)  HR: 67 (12 Nov 2021 14:00) (57 - 90)  BP: 106/63 (12 Nov 2021 14:00) (91/59 - 163/91)  BP(mean): 73 (12 Nov 2021 14:00) (65 - 94)  RR: 13 (12 Nov 2021 14:00) (12 - 25)  SpO2: 94% (12 Nov 2021 14:00) (90% - 100%)  PHYSICAL EXAM:    Constitutional: NAD, awake and alert, obese   HEENT: PERR, EOMI, Normal Hearing, MMM  Neck: Soft and supple, No LAD, No JVD  Respiratory: Breath sounds are clear bilaterally, No wheezing, rales or rhonchi  Cardiovascular: S1 and S2, regular rate and rhythm, no Murmurs, gallops or rubs  Gastrointestinal: Bowel Sounds present, soft, nontender, nondistended, no guarding, no rebound. + ostomy right side ileostomy in place with sweat  Extremities: No peripheral edema  Vascular: 2+ peripheral pulses  Neurological: A/O x 3, no focal deficits  Musculoskeletal: 5/5 strength b/l upper and lower extremities  Skin: No rashes                          13.1   18.37 )-----------( 351      ( 12 Nov 2021 05:09 )             43.4   11-12    x   |  x   |  x   ----------------------------<  x   4.5   |  x   |  x     Ca    8.2<L>      12 Nov 2021 05:09  Phos  6.0     11-12  Mg     2.1     11-12    TPro  6.4  /  Alb  2.4<L>  /  TBili  0.5  /  DBili  x   /  AST  23  /  ALT  28  /  AlkPhos  85  11-12    MEDICATIONS  (STANDING):  carvedilol 12.5 milliGRAM(s) Oral every 12 hours  glucagon  Injectable 1 milliGRAM(s) IntraMuscular once  heparin   Injectable 5000 Unit(s) SubCutaneous every 8 hours  HYDROmorphone PCA (1 mG/mL) 30 milliLiter(s) PCA Continuous PCA Continuous  influenza   Vaccine 0.5 milliLiter(s) IntraMuscular once  insulin glargine Injectable (LANTUS) 35 Unit(s) SubCutaneous at bedtime  insulin lispro (ADMELOG) corrective regimen sliding scale   SubCutaneous three times a day before meals  lactated ringers. 1000 milliLiter(s) (125 mL/Hr) IV Continuous <Continuous>  nicotine -  14 mG/24Hr(s) Patch 1 patch Transdermal daily  pantoprazole  Injectable 40 milliGRAM(s) IV Push daily  piperacillin/tazobactam IVPB.. 3.375 Gram(s) IV Intermittent every 12 hours  tamsulosin 0.4 milliGRAM(s) Oral at bedtime

## 2021-11-26 DIAGNOSIS — F41.9 ANXIETY: ICD-10-CM

## 2021-12-01 ENCOUNTER — ASSISTED LIVING VISIT (OUTPATIENT)
Dept: GERIATRICS | Facility: CLINIC | Age: 86
End: 2021-12-01
Payer: MEDICARE

## 2021-12-01 VITALS
RESPIRATION RATE: 16 BRPM | DIASTOLIC BLOOD PRESSURE: 67 MMHG | TEMPERATURE: 96.2 F | HEART RATE: 81 BPM | SYSTOLIC BLOOD PRESSURE: 153 MMHG | WEIGHT: 119.4 LBS | BODY MASS INDEX: 18.1 KG/M2 | OXYGEN SATURATION: 100 % | HEIGHT: 68 IN

## 2021-12-01 DIAGNOSIS — Z95.0 CARDIAC PACEMAKER IN SITU: ICD-10-CM

## 2021-12-01 DIAGNOSIS — F03.91 DEMENTIA WITH BEHAVIORAL DISTURBANCE, UNSPECIFIED DEMENTIA TYPE: Primary | ICD-10-CM

## 2021-12-01 DIAGNOSIS — K59.01 SLOW TRANSIT CONSTIPATION: ICD-10-CM

## 2021-12-01 DIAGNOSIS — I10 BENIGN ESSENTIAL HYPERTENSION: ICD-10-CM

## 2021-12-01 DIAGNOSIS — F41.9 ANXIETY: ICD-10-CM

## 2021-12-01 DIAGNOSIS — R52 GENERALIZED PAIN: ICD-10-CM

## 2021-12-01 ASSESSMENT — MIFFLIN-ST. JEOR: SCORE: 1025.09

## 2021-12-01 NOTE — PROGRESS NOTES
[place] [facility]  Chief Complaint   Patient presents with     Annual Comprehensive Exam Assisted Living     [facility] Record Number:  [id number]  Place of Service where encounter took place:  [facility] [[id number]]    HPI:    Annabel May  is a 87 year old  (5/12/1934), who is being seen today for an annual comprehensive visit. HPI information obtained from: facility chart records, facility staff, [facility] Epic chart review and family/first contact son-Kelvin report.  Today's concerns are:    Seen today for follow up to ongoing conditions. Anxiety continues, worried look on face, cries frequently. Slight improvement since admission. Hx of stool impaction and mostly improved per staff with regular bowel movements but needs toilet assist and adaptive clothing 2/2 spreading stool around many apartments. Ambulates around the unit w/o assistive device. Last fall on 11/20. Ongoing hallucinations that started per her son last year. Some refusal of medications per son's report. Resident unable to give history with advanced dementia and nonsensical conversation.    ALLERGIES: Midazolam and Vecuronium  PAST MEDICAL HISTORY:  has a past medical history of Arrhythmia, Arthritis, Breast cancer (H), Hyperlipidaemia, Hypertension, Hypothyroid, Migraines, PONV (postoperative nausea and vomiting), and Syncope.    She has no past medical history of Malignant neoplasm (H).  PAST SURGICAL HISTORY:  has a past surgical history that includes Breast surgery; Head and neck surgery; ENT surgery; Hysterectomy; GYN surgery; appendectomy; Cholecystectomy; Phacoemulsification clear cornea with standard intraocular lens implant, endoscopic cyclophotocoagulation, combined (6/28/2012); and Glaucoma surgery (Left, 10/16/2017).  IMMUNIZATIONS:  Immunization History   Administered Date(s) Administered     COVID-19,PF,Moderna 01/15/2021, 02/12/2021, 11/23/2021     Flu, Unspecified 10/23/2003,  10/20/2004, 10/17/2005, 10/23/2006, 09/24/2010     [id number] Novel Flu 12/15/2009     Influenza (H1N1) 12/15/2009     Influenza (High Dose) 3 valent vaccine 09/01/2015, 10/17/2015, 09/29/2016, 10/05/2017, 10/04/2018, 10/02/2019     Influenza (IIV3) PF 12/06/2000, 10/26/2001, 10/28/2002, 10/23/2003, 10/20/2004, 10/17/2005, 10/23/2006, 10/04/2008, 09/24/2010, 09/11/2012, 10/18/2013, 10/05/2017, 10/04/2018     Influenza Quad, Recombinant, pf(RIV4) (Flublok) 09/23/2021     Influenza, Quad, High Dose, Pf, 65yr+ (Fluzone HD) 09/23/2020     Pneumo Conj 13-V (2010&after) 08/02/2016     Pneumococcal 23 valent 05/21/2004, 07/10/2014     TDAP Vaccine (Adacel) 01/22/2020     Td (Adult), Adsorbed 05/16/2008     Zoster vaccine recombinant adjuvanted (SHINGRIX) 12/13/2018, 02/28/2019     Zoster vaccine, live 07/30/2007     Above immunizations pulled from [facility]. [facility] and facility records also reconciled. Outstanding information sent to  to update [facility].  Future immunizations are not needed at this point as all recommended immunizations are up to date.     Current Outpatient Medications   Medication Sig Dispense Refill     ACETAMINOPHEN EXTRA STRENGTH 500 MG tablet TAKE TWO TABLETS (1000MG) BY MOUTH TWICE DAILY;& TAKE 1 TABLET BY MOUTH TWICE DAILY AS NEEDED FOR MILD PAIN 186 tablet 97     AZOPT 1 % ophthalmic suspension INSTILL ONE DROP IN EACH EYE TWICE DAILY 10 mL 97     bisacodyl (DULCOLAX) 10 MG suppository UNWRAP AND INSERT ONE SUPPOSITORY RECTALLY DAILY AS NEEDED FOR CONSTIPATION (GIVE IF NO BOWEL MOVEMENT FOR 3 DAYS) 10 suppository 97     cyanocobalamin (VITAMIN B-12) 100 MCG tablet TAKE 1 TABLET BY MOUTH ONCE DAILY 28 tablet PRN     ELIQUIS ANTICOAGULANT 2.5 MG tablet TAKE 1 TABLET BY MOUTH TWICE DAILY 56 tablet PRN     latanoprost (XALATAN) 0.005 % ophthalmic solution INSTILL ONE DROP IN EACH EYE AT BEDTIME 2.5 mL 97     levothyroxine (SYNTHROID/LEVOTHROID) 88 MCG tablet TAKE 1 TABLET BY MOUTH  ONCE DAILY 29 tablet PRN     MELATONIN MAXIMUM STRENGTH 5 MG tablet TAKE 1 TABLET BY MOUTH AT BEDTIME 28 tablet PRN     metoprolol succinate ER (TOPROL-XL) 25 MG 24 hr tablet TAKE 1 TABLET BY MOUTH ONCE DAILY 28 tablet PRN     mirtazapine (REMERON) 15 MG tablet TAKE 1 TABLET BY MOUTH AT BEDTIME  28 tablet 97     Multiple Vitamins-Minerals (PRESERVISION AREDS) CAPS TAKE 1 CAPSULE BY MOUTH TWICE DAILY 56 capsule PRN     QUEtiapine (SEROQUEL) 25 MG tablet TAKE 1 TABLET BY MOUTH THREE TIMES DAILY;& TAKE 1 TABLET BY MOUTH TWICE DAILY AS NEEDED 84 tablet PRN     QUEtiapine (SEROQUEL) 25 MG tablet Take 1 tablet (25 mg) by mouth 2 times daily as needed (agitation) 30 tablet 0     SENEXON-S 8.6-50 MG tablet TAKE TWO TABLETS BY MOUTH TWICE DAILY ( HOLD FOR LOOSE STOOL ) 124 tablet 97     sertraline (ZOLOFT) 50 MG tablet TAKE 1 TABLET BY MOUTH ONCE DAILY 28 tablet 97     SM CLEARLAX 17 GM/SCOOP powder MIX 17GM OF POWDER IN 8OZ OF WATER UNTIL COMPLETELY DISSOLVED. DRINK SOLUTION DAILY;& GIVE 2ND DOSE TWICE DAILY ON MONDAY, WEDNESDAY AND FRIDAY   HOLD FOR LOOSE STOOL 510 g 97     Sodium Phosphates (ENEMA READY-TO-USE RE) Use as needed       traZODone (DESYREL) 50 MG tablet TAKE 1 TABLET BY MOUTH AT BEDTIME 28 tablet PRN     Vitamin D3 (CHOLECALCIFEROL) 25 mcg (1000 units) tablet Take 1 tablet (25 mcg) by mouth daily 30 tablet 11     Case Management:  I have reviewed the Assisted Living care plan, current immunizations and preventive care/cancer screening. .Future cancer screening is not clinically indicated secondary to age/goals of care Patient's desire to return to the community is not assessible due to cognitive impairment. Current Level of Care is appropriate.    Advance Directive Discussion:    I reviewed the current advanced directives as reflected in EPIC, the POLST and the facility chart, and verified the congruency of orders. I contacted the first party [facility] and discussed the plan of Care.  I did not due to cognitive  "impairment review the advance directives with the resident.     Team Discussion:  I communicated with the appropriate disciplines involved with the Plan of Care:   Nursing    Patient's goal is pain control and comfort. Treat reversible conditions   Information reviewed:  Medications, vital signs, orders, and nursing notes.    ROS:  Unobtainable secondary to cognitive impairment.     Vitals:  BP (!) 153/67   Pulse 81   Temp (!) 96.2  F (35.7  C)   Resp 16   Ht 1.727 m (5' 8\")   Wt 54.2 kg (119 lb 6.4 oz)   SpO2 100%   BMI 18.15 kg/m   Body mass index is 18.15 kg/m .  Exam:  GENERAL APPEARANCE:  Alert, in no distress, anxious at baseline   ENT:  Mouth and posterior oropharynx normal, dry mucous membranes, normal hearing acuity  EYES: lids, pupils and irises normal  RESP:  respiratory effort and palpation of chest normal, lungs clear to auscultation   CV:  Palpation and auscultation of heart done , regular rate and rhythm, no murmur, rub, or gallop, no edema  ABDOMEN: hypoactive BS, abdominal area is rounded and slightly distended.firm   M/S:   Gait and station at baseline-ambulates w/o assistive device-mostly steady    SKIN:  Inspection of skin and subcutaneous tissue baseline to visualized areas. Cyst/nodule hard on index finger right hand  NEURO:   Cranial nerves 2-12 are normal tested and grossly at patient's baseline  PSYCH:  insight and judgement impaired, memory impaired     Lab/Diagnostic data:   CBC RESULTS: Recent Labs   Lab Test 10/21/21  0708 11/25/20  0653   WBC 4.5 5.4   RBC 3.47* 4.02   HGB 11.3* 13.3   HCT 36.6 41.4   * 103*   MCH 32.6 33.1*   MCHC 30.9* 32.1   RDW 13.5 14.2   * 179       Last Basic Metabolic Panel:  Recent Labs   Lab Test 10/21/21  0708 11/25/20  0653   * 140   POTASSIUM 4.2 3.8   CHLORIDE 115* 111*   AMERICO 8.2* 8.8   CO2 24 26   BUN 18 21   CR 0.94 0.92   GLC 80 87       Liver Function Studies -   Recent Labs   Lab Test 10/21/21  0708 01/06/20  1530 "   PROTTOTAL 6.1* 7.7   ALBUMIN 3.2* 4.4   BILITOTAL 0.4 0.9   ALKPHOS 63 66   AST 15 31   ALT 12 29       TSH   Date Value Ref Range Status   10/21/2021 3.99 0.40 - 4.00 mU/L Final   12/23/2020 4.80 (H) 0.40 - 4.00 mU/L Final   11/25/2020 8.30 (H) 0.40 - 4.00 mU/L Final       No results found for: A1C    ASSESSMENT/PLAN  (F03.91) Dementia with behavioral disturbance, unspecified dementia type (H)  (primary encounter diagnosis)  Comment: advanced   Plan:   -Seroquel 25 mg po TID and daily prn for agitation/hallucinations (prn used 3x this month, 7x last month  -adaptive clothing for behaviors     (F41.9) Anxiety  Comment: ongoing   Plan:   -increase Sertraline to 75 mg po daily  -mirtazapine 15 mg po daily     (I10) Benign essential hypertension  (Z95.0) Cardiac pacemaker in situ  Comment: stable   Plan:   -continue current plan of care    (K59.01) Slow transit constipation  Comment: staff with be assisting with bowel movements with use of adaptive clothing   Plan:   Senna S 2 tabs po BID  -Miralax daily and BID 3x weekly  -supp daily prn  -enema prn      (R52) Generalized pain  Comment: stable   Plan:   -tylenol scheduled and prn       Electronically signed by:  ZAFAR Bae CNP

## 2021-12-01 NOTE — LETTER
12/1/2021        RE: Annabel [address] On [address]  [address] [facility] [facility] [address]        [place] [facility]  Chief Complaint   Patient presents with     Annual Comprehensive Exam Assisted Living     [facility] Record Number:  [id number]  Place of Service where encounter took place:  [facility] [[id number]]    HPI:    Annabel May  is a 87 year old  (5/12/1934), who is being seen today for an annual comprehensive visit. HPI information obtained from: facility chart records, facility staff, [facility] Epic chart review and family/first contact son-Kelvin report.  Today's concerns are:    Seen today for follow up to ongoing conditions. Anxiety continues, worried look on face, cries frequently. Slight improvement since admission. Hx of stool impaction and mostly improved per staff with regular bowel movements but needs toilet assist and adaptive clothing 2/2 spreading stool around many apartments. Ambulates around the unit w/o assistive device. Last fall on 11/20. Ongoing hallucinations that started per her son last year. Some refusal of medications per son's report. Resident unable to give history with advanced dementia and nonsensical conversation.    ALLERGIES: Midazolam and Vecuronium  PAST MEDICAL HISTORY:  has a past medical history of Arrhythmia, Arthritis, Breast cancer (H), Hyperlipidaemia, Hypertension, Hypothyroid, Migraines, PONV (postoperative nausea and vomiting), and Syncope.    She has no past medical history of Malignant neoplasm (H).  PAST SURGICAL HISTORY:  has a past surgical history that includes Breast surgery; Head and neck surgery; ENT surgery; Hysterectomy; GYN surgery; appendectomy; Cholecystectomy; Phacoemulsification clear cornea with standard intraocular lens implant, endoscopic cyclophotocoagulation, combined (6/28/2012); and Glaucoma surgery (Left, 10/16/2017).  IMMUNIZATIONS:  Immunization History   Administered Date(s)  Administered     COVID-19,PF,Moderna 01/15/2021, 02/12/2021, 11/23/2021     Flu, Unspecified 10/23/2003, 10/20/2004, 10/17/2005, 10/23/2006, 09/24/2010     [id number] Novel Flu 12/15/2009     Influenza (H1N1) 12/15/2009     Influenza (High Dose) 3 valent vaccine 09/01/2015, 10/17/2015, 09/29/2016, 10/05/2017, 10/04/2018, 10/02/2019     Influenza (IIV3) PF 12/06/2000, 10/26/2001, 10/28/2002, 10/23/2003, 10/20/2004, 10/17/2005, 10/23/2006, 10/04/2008, 09/24/2010, 09/11/2012, 10/18/2013, 10/05/2017, 10/04/2018     Influenza Quad, Recombinant, pf(RIV4) (Flublok) 09/23/2021     Influenza, Quad, High Dose, Pf, 65yr+ (Fluzone HD) 09/23/2020     Pneumo Conj 13-V (2010&after) 08/02/2016     Pneumococcal 23 valent 05/21/2004, 07/10/2014     TDAP Vaccine (Adacel) 01/22/2020     Td (Adult), Adsorbed 05/16/2008     Zoster vaccine recombinant adjuvanted (SHINGRIX) 12/13/2018, 02/28/2019     Zoster vaccine, live 07/30/2007     Above immunizations pulled from [facility]. [facility] and facility records also reconciled. Outstanding information sent to  to update [facility].  Future immunizations are not needed at this point as all recommended immunizations are up to date.     Current Outpatient Medications   Medication Sig Dispense Refill     ACETAMINOPHEN EXTRA STRENGTH 500 MG tablet TAKE TWO TABLETS (1000MG) BY MOUTH TWICE DAILY;& TAKE 1 TABLET BY MOUTH TWICE DAILY AS NEEDED FOR MILD PAIN 186 tablet 97     AZOPT 1 % ophthalmic suspension INSTILL ONE DROP IN EACH EYE TWICE DAILY 10 mL 97     bisacodyl (DULCOLAX) 10 MG suppository UNWRAP AND INSERT ONE SUPPOSITORY RECTALLY DAILY AS NEEDED FOR CONSTIPATION (GIVE IF NO BOWEL MOVEMENT FOR 3 DAYS) 10 suppository 97     cyanocobalamin (VITAMIN B-12) 100 MCG tablet TAKE 1 TABLET BY MOUTH ONCE DAILY 28 tablet PRN     ELIQUIS ANTICOAGULANT 2.5 MG tablet TAKE 1 TABLET BY MOUTH TWICE DAILY 56 tablet PRN     latanoprost (XALATAN) 0.005 % ophthalmic solution INSTILL ONE DROP IN  EACH EYE AT BEDTIME 2.5 mL 97     levothyroxine (SYNTHROID/LEVOTHROID) 88 MCG tablet TAKE 1 TABLET BY MOUTH ONCE DAILY 29 tablet PRN     MELATONIN MAXIMUM STRENGTH 5 MG tablet TAKE 1 TABLET BY MOUTH AT BEDTIME 28 tablet PRN     metoprolol succinate ER (TOPROL-XL) 25 MG 24 hr tablet TAKE 1 TABLET BY MOUTH ONCE DAILY 28 tablet PRN     mirtazapine (REMERON) 15 MG tablet TAKE 1 TABLET BY MOUTH AT BEDTIME  28 tablet 97     Multiple Vitamins-Minerals (PRESERVISION AREDS) CAPS TAKE 1 CAPSULE BY MOUTH TWICE DAILY 56 capsule PRN     QUEtiapine (SEROQUEL) 25 MG tablet TAKE 1 TABLET BY MOUTH THREE TIMES DAILY;& TAKE 1 TABLET BY MOUTH TWICE DAILY AS NEEDED 84 tablet PRN     QUEtiapine (SEROQUEL) 25 MG tablet Take 1 tablet (25 mg) by mouth 2 times daily as needed (agitation) 30 tablet 0     SENEXON-S 8.6-50 MG tablet TAKE TWO TABLETS BY MOUTH TWICE DAILY ( HOLD FOR LOOSE STOOL ) 124 tablet 97     sertraline (ZOLOFT) 50 MG tablet TAKE 1 TABLET BY MOUTH ONCE DAILY 28 tablet 97     SM CLEARLAX 17 GM/SCOOP powder MIX 17GM OF POWDER IN 8OZ OF WATER UNTIL COMPLETELY DISSOLVED. DRINK SOLUTION DAILY;& GIVE 2ND DOSE TWICE DAILY ON MONDAY, WEDNESDAY AND FRIDAY   HOLD FOR LOOSE STOOL 510 g 97     Sodium Phosphates (ENEMA READY-TO-USE RE) Use as needed       traZODone (DESYREL) 50 MG tablet TAKE 1 TABLET BY MOUTH AT BEDTIME 28 tablet PRN     Vitamin D3 (CHOLECALCIFEROL) 25 mcg (1000 units) tablet Take 1 tablet (25 mcg) by mouth daily 30 tablet 11     Case Management:  I have reviewed the Assisted Living care plan, current immunizations and preventive care/cancer screening. .Future cancer screening is not clinically indicated secondary to age/goals of care Patient's desire to return to the community is not assessible due to cognitive impairment. Current Level of Care is appropriate.    Advance Directive Discussion:    I reviewed the current advanced directives as reflected in EPIC, the POLST and the facility chart, and verified the  "congruency of orders. I contacted the first party [facility] and discussed the plan of Care.  I did not due to cognitive impairment review the advance directives with the resident.     Team Discussion:  I communicated with the appropriate disciplines involved with the Plan of Care:   Nursing    Patient's goal is pain control and comfort. Treat reversible conditions   Information reviewed:  Medications, vital signs, orders, and nursing notes.    ROS:  Unobtainable secondary to cognitive impairment.     Vitals:  BP (!) 153/67   Pulse 81   Temp (!) 96.2  F (35.7  C)   Resp 16   Ht 1.727 m (5' 8\")   Wt 54.2 kg (119 lb 6.4 oz)   SpO2 100%   BMI 18.15 kg/m   Body mass index is 18.15 kg/m .  Exam:  GENERAL APPEARANCE:  Alert, in no distress, anxious at baseline   ENT:  Mouth and posterior oropharynx normal, dry mucous membranes, normal hearing acuity  EYES: lids, pupils and irises normal  RESP:  respiratory effort and palpation of chest normal, lungs clear to auscultation   CV:  Palpation and auscultation of heart done , regular rate and rhythm, no murmur, rub, or gallop, no edema  ABDOMEN: hypoactive BS, abdominal area is rounded and slightly distended.firm   M/S:   Gait and station at baseline-ambulates w/o assistive device-mostly steady    SKIN:  Inspection of skin and subcutaneous tissue baseline to visualized areas. Cyst/nodule hard on index finger right hand  NEURO:   Cranial nerves 2-12 are normal tested and grossly at patient's baseline  PSYCH:  insight and judgement impaired, memory impaired     Lab/Diagnostic data:   CBC RESULTS: Recent Labs   Lab Test 10/21/21  0708 11/25/20  0653   WBC 4.5 5.4   RBC 3.47* 4.02   HGB 11.3* 13.3   HCT 36.6 41.4   * 103*   MCH 32.6 33.1*   MCHC 30.9* 32.1   RDW 13.5 14.2   * 179       Last Basic Metabolic Panel:  Recent Labs   Lab Test 10/21/21  0708 11/25/20  0653   * 140   POTASSIUM 4.2 3.8   CHLORIDE 115* 111*   AMERICO 8.2* 8.8   CO2 24 26   BUN 18 21   CR " 0.94 0.92   GLC 80 87       Liver Function Studies -   Recent Labs   Lab Test 10/21/21  0708 01/06/20  1530   PROTTOTAL 6.1* 7.7   ALBUMIN 3.2* 4.4   BILITOTAL 0.4 0.9   ALKPHOS 63 66   AST 15 31   ALT 12 29       TSH   Date Value Ref Range Status   10/21/2021 3.99 0.40 - 4.00 mU/L Final   12/23/2020 4.80 (H) 0.40 - 4.00 mU/L Final   11/25/2020 8.30 (H) 0.40 - 4.00 mU/L Final       No results found for: A1C    ASSESSMENT/PLAN  (F03.91) Dementia with behavioral disturbance, unspecified dementia type (H)  (primary encounter diagnosis)  Comment: advanced   Plan:   -Seroquel 25 mg po TID and daily prn for agitation/hallucinations (prn used 3x this month, 7x last month  -adaptive clothing for behaviors     (F41.9) Anxiety  Comment: ongoing   Plan:   -increase Sertraline to 75 mg po daily  -mirtazapine 15 mg po daily     (I10) Benign essential hypertension  (Z95.0) Cardiac pacemaker in situ  Comment: stable   Plan:   -continue current plan of care    (K59.01) Slow transit constipation  Comment: staff with be assisting with bowel movements with use of adaptive clothing   Plan:   Senna S 2 tabs po BID  -Miralax daily and BID 3x weekly  -supp daily prn  -enema prn      (R52) Generalized pain  Comment: stable   Plan:   -tylenol scheduled and prn       Electronically signed by:  ZAFAR Bae CNP               Sincerely,        ZAFAR Bae CNP     PAST MEDICAL/SURGICAL/SOCIAL HISTORY/PHYSICAL EXAM/HIV/HISTORY OF PRESENT ILLNESS/VITAL SIGNS( Pullset)/DISPOSITION/REVIEW OF SYSTEMS HIV/PHYSICAL EXAM/REVIEW OF SYSTEMS/DISPOSITION/HISTORY OF PRESENT ILLNESS/VITAL SIGNS( Pullset)/RESULTS/PAST MEDICAL/SURGICAL/SOCIAL HISTORY

## 2021-12-01 NOTE — LETTER
12/1/2021        RE: Annabel Pan On [place]  [address] [facility] [address]        [place] [facility]  Chief Complaint   Patient presents with     Annual Comprehensive Exam Assisted Living     [place] Medical Record Number:  [id number]  Place of Service where encounter took place:  [facility] ([facility]) [[id number]]    HPI:    Annabel May  is a 87 year old  (5/12/1934), who is being seen today for an annual comprehensive visit. HPI information obtained from: {FGS HPI:[id number]}.  Today's concerns are:  {FGS DX:432144}  {HTN}    ALLERGIES: Midazolam and Vecuronium  PAST MEDICAL HISTORY:  has a past medical history of Arrhythmia, Arthritis, Breast cancer (H), Hyperlipidaemia, Hypertension, Hypothyroid, Migraines, PONV (postoperative nausea and vomiting), and Syncope.She has no past medical history of Malignant neoplasm (H).  PAST SURGICAL HISTORY:  has a past surgical history that includes Breast surgery; Head and neck surgery; ENT surgery; Hysterectomy; GYN surgery; appendectomy; Cholecystectomy; Phacoemulsification clear cornea with standard intraocular lens implant, endoscopic cyclophotocoagulation, combined (6/28/2012); and Glaucoma surgery (Left, 10/16/2017).  IMMUNIZATIONS:  Immunization History   Administered Date(s) Administered     COVID-19,PF,Moderna 01/15/2021, 02/12/2021     Flu, Unspecified 10/23/2003, 10/20/2004, 10/17/2005, 10/23/2006, 09/24/2010     [id number] Novel Flu 12/15/2009     Influenza (H1N1) 12/15/2009     Influenza (High Dose) 3 valent vaccine 09/01/2015, 10/17/2015, 09/29/2016, 10/05/2017, 10/04/2018, 10/02/2019     Influenza (IIV3) PF 12/06/2000, 10/26/2001, 10/28/2002, 10/23/2003, 10/20/2004, 10/17/2005, 10/23/2006, 10/04/2008, 09/24/2010, 09/11/2012, 10/18/2013, 10/05/2017, 10/04/2018     Influenza Quad, Recombinant, pf(RIV4) (Flublok) 09/23/2021     Influenza, Quad, High Dose, Pf, 65yr+ (Fluzone HD) 09/23/2020     Pneumo Conj 13-V  (2010&after) 08/02/2016     Pneumococcal 23 valent 05/21/2004, 07/10/2014     TDAP Vaccine (Adacel) 01/22/2020     Td (Adult), Adsorbed 05/16/2008     Zoster vaccine recombinant adjuvanted (SHINGRIX) 12/13/2018, 02/28/2019     Zoster vaccine, live 07/30/2007     Above immunizations pulled from [facility]. [facility] and facility records also reconciled. Outstanding information sent to  to update [facility] ***.  Future immunizations {fgs future immunization:310891}  ***  Current Outpatient Medications   Medication Sig Dispense Refill     ACETAMINOPHEN EXTRA STRENGTH 500 MG tablet TAKE TWO TABLETS (1000MG) BY MOUTH TWICE DAILY;& TAKE 1 TABLET BY MOUTH TWICE DAILY AS NEEDED FOR MILD PAIN 186 tablet 97     AZOPT 1 % ophthalmic suspension INSTILL ONE DROP IN EACH EYE TWICE DAILY 10 mL 97     bisacodyl (DULCOLAX) 10 MG suppository UNWRAP AND INSERT ONE SUPPOSITORY RECTALLY DAILY AS NEEDED FOR CONSTIPATION (GIVE IF NO BOWEL MOVEMENT FOR 3 DAYS) 10 suppository 97     cyanocobalamin (VITAMIN B-12) 100 MCG tablet TAKE 1 TABLET BY MOUTH ONCE DAILY 28 tablet PRN     ELIQUIS ANTICOAGULANT 2.5 MG tablet TAKE 1 TABLET BY MOUTH TWICE DAILY 56 tablet PRN     latanoprost (XALATAN) 0.005 % ophthalmic solution INSTILL ONE DROP IN EACH EYE AT BEDTIME 2.5 mL 97     levothyroxine (SYNTHROID/LEVOTHROID) 88 MCG tablet TAKE 1 TABLET BY MOUTH ONCE DAILY 29 tablet PRN     MELATONIN MAXIMUM STRENGTH 5 MG tablet TAKE 1 TABLET BY MOUTH AT BEDTIME 28 tablet PRN     metoprolol succinate ER (TOPROL-XL) 25 MG 24 hr tablet TAKE 1 TABLET BY MOUTH ONCE DAILY 28 tablet PRN     mirtazapine (REMERON) 15 MG tablet TAKE 1 TABLET BY MOUTH AT BEDTIME ***NOTE DOSAGE/STRENGTH*** 28 tablet 97     Multiple Vitamins-Minerals (PRESERVISION AREDS) CAPS TAKE 1 CAPSULE BY MOUTH TWICE DAILY 56 capsule PRN     QUEtiapine (SEROQUEL) 25 MG tablet TAKE 1 TABLET BY MOUTH THREE TIMES DAILY;& TAKE 1 TABLET BY MOUTH TWICE DAILY AS NEEDED 84 tablet PRN      "QUEtiapine (SEROQUEL) 25 MG tablet Take 1 tablet (25 mg) by mouth 2 times daily as needed (agitation) 30 tablet 0     SENEXON-S 8.6-50 MG tablet TAKE TWO TABLETS BY MOUTH TWICE DAILY ( HOLD FOR LOOSE STOOL ) 124 tablet 97     sertraline (ZOLOFT) 50 MG tablet TAKE 1 TABLET BY MOUTH ONCE DAILY 28 tablet 97     SM CLEARLAX 17 GM/SCOOP powder MIX 17GM OF POWDER IN 8OZ OF WATER UNTIL COMPLETELY DISSOLVED. DRINK SOLUTION DAILY;& GIVE 2ND DOSE TWICE DAILY ON MONDAY, WEDNESDAY AND FRIDAY  * HOLD FOR LOOSE STOOL* 510 g 97     Sodium Phosphates (ENEMA READY-TO-USE RE) Use as needed       traZODone (DESYREL) 50 MG tablet TAKE 1 TABLET BY MOUTH AT BEDTIME 28 tablet PRN     Vitamin D3 (CHOLECALCIFEROL) 25 mcg (1000 units) tablet Take 1 tablet (25 mcg) by mouth daily 30 tablet 11     {Providers Please double check the med list (in the plan section >> meds & orders tab) and Discontinue any of the meds flagged by the TC to be discontinued}  ***  Case Management:  I have reviewed the {fgsannualcareplan1:525379} .{fgs cancer screenin} Patient's desire to return to the community is {FGS RETURN TO COMMUNITY:683860}. Current Level of Care is appropriate.    Advance Directive Discussion:    I reviewed the current advanced directives as reflected in EPIC, the POLST and the facility chart, and verified the congruency of orders ***. I contacted the first party *** and {ADVANCED DIRECTIVE DISCUSSION:846579} plan of Care.  I {DID/DID NOT:455244} review the advance directives with the resident.     Team Discussion:  I communicated with the appropriate disciplines involved with the Plan of Care:   {NURSING HOME DISCIPLINES:324501}  Patient's goal is {fgsgoal:765648}.  Information reviewed:  Medications, vital signs, orders, and nursing notes.    ROS:  {fvxvau42:198355}    Vitals:  Temp (!) 96.2  F (35.7  C)   Ht 1.727 m (5' 8\")   Wt 54.2 kg (119 lb 6.4 oz)   SpO2 100%   BMI 18.15 kg/m   Body mass index is 18.15 " kg/m .  Exam:  {Nursing home physical exam :222807} {2 in each of 9 for comp exam}    Lab/Diagnostic data:   {fgslab:171834}    ASSESSMENT/PLAN  {FGS DX:466700}  {HTN}    {fgsorders:810688}  ***    Electronically signed by:  Kayley Sung ***  {Providers Please double check the med list (in the plan section >> meds & orders tab) and Discontinue any of the meds flagged by the TC to be discontinued}            Sincerely,        ZAFAR Bae CNP

## 2021-12-01 NOTE — LETTER
New Orders for Annabel May    1. Discontinued current dose and begin sertraline (ZOLOFT) 50 MG tablet    Take 1.5 tablets (75 mg) by mouth daily for anxiety       ZAFAR Bae CNP on 12/1/2021 at 8:51 PM

## 2021-12-09 DIAGNOSIS — H35.30 MACULAR DEGENERATION (SENILE) OF RETINA: ICD-10-CM

## 2021-12-09 DIAGNOSIS — I10 BENIGN ESSENTIAL HYPERTENSION: Primary | ICD-10-CM

## 2021-12-09 RX ORDER — VIT A/VIT C/VIT E/ZINC/COPPER 2148-113
1 TABLET ORAL 2 TIMES DAILY
Qty: 30 TABLET | Refills: 11 | Status: SHIPPED | OUTPATIENT
Start: 2021-12-09 | End: 2022-01-01

## 2021-12-09 RX ORDER — METOPROLOL TARTRATE 25 MG/1
12.5 TABLET, FILM COATED ORAL 2 TIMES DAILY
Qty: 30 TABLET | Refills: 11 | Status: SHIPPED | OUTPATIENT
Start: 2021-12-09 | End: 2022-01-01

## 2021-12-09 NOTE — PROGRESS NOTES
metoprolol tartrate (LOPRESSOR) 25 MG tablet Take 0.5 tablets (12.5 mg) by mouth 2 times daily     Multiple Vitamins-Minerals (PRESERVISION AREDS) TABS Take 1 tablet by mouth 2 times daily     -Discontinue Metoprolol ER 25 mg po BID   -Discontinue Preservision gel caps    Above changes as resident needs medications crushed.

## 2021-12-24 DIAGNOSIS — E03.9 ACQUIRED HYPOTHYROIDISM: ICD-10-CM

## 2021-12-24 DIAGNOSIS — F33.41 RECURRENT MAJOR DEPRESSION IN PARTIAL REMISSION (H): ICD-10-CM

## 2021-12-24 RX ORDER — LEVOTHYROXINE SODIUM 88 UG/1
TABLET ORAL
Qty: 90 TABLET | Refills: 3 | Status: SHIPPED | OUTPATIENT
Start: 2021-12-24 | End: 2022-01-01

## 2021-12-24 RX ORDER — TRAZODONE HYDROCHLORIDE 50 MG/1
TABLET, FILM COATED ORAL
Qty: 90 TABLET | Refills: 3 | Status: SHIPPED | OUTPATIENT
Start: 2021-12-24 | End: 2022-01-01

## 2022-01-01 ENCOUNTER — ASSISTED LIVING VISIT (OUTPATIENT)
Dept: GERIATRICS | Facility: CLINIC | Age: 87
End: 2022-01-01
Payer: MEDICARE

## 2022-01-01 ENCOUNTER — DOCUMENTATION ONLY (OUTPATIENT)
Dept: OTHER | Facility: CLINIC | Age: 87
End: 2022-01-01

## 2022-01-01 ENCOUNTER — LAB REQUISITION (OUTPATIENT)
Dept: LAB | Facility: CLINIC | Age: 87
End: 2022-01-01
Payer: MEDICARE

## 2022-01-01 ENCOUNTER — APPOINTMENT (OUTPATIENT)
Dept: CT IMAGING | Facility: CLINIC | Age: 87
End: 2022-01-01
Attending: EMERGENCY MEDICINE
Payer: MEDICARE

## 2022-01-01 ENCOUNTER — TRANSFERRED RECORDS (OUTPATIENT)
Dept: HEALTH INFORMATION MANAGEMENT | Facility: CLINIC | Age: 87
End: 2022-01-01
Payer: MEDICARE

## 2022-01-01 ENCOUNTER — TELEPHONE (OUTPATIENT)
Dept: GERIATRICS | Facility: CLINIC | Age: 87
End: 2022-01-01

## 2022-01-01 ENCOUNTER — HOSPITAL ENCOUNTER (EMERGENCY)
Facility: CLINIC | Age: 87
Discharge: HOME | End: 2022-10-15
Attending: EMERGENCY MEDICINE | Admitting: EMERGENCY MEDICINE
Payer: MEDICARE

## 2022-01-01 ENCOUNTER — HOSPITAL ENCOUNTER (EMERGENCY)
Facility: CLINIC | Age: 87
Discharge: HOME | End: 2022-06-17
Attending: EMERGENCY MEDICINE | Admitting: EMERGENCY MEDICINE
Payer: MEDICARE

## 2022-01-01 VITALS
HEIGHT: 68 IN | DIASTOLIC BLOOD PRESSURE: 87 MMHG | TEMPERATURE: 97.8 F | BODY MASS INDEX: 18.85 KG/M2 | WEIGHT: 124.4 LBS | HEART RATE: 82 BPM | OXYGEN SATURATION: 94 % | RESPIRATION RATE: 16 BRPM | SYSTOLIC BLOOD PRESSURE: 150 MMHG

## 2022-01-01 VITALS
TEMPERATURE: 98 F | DIASTOLIC BLOOD PRESSURE: 84 MMHG | HEART RATE: 77 BPM | OXYGEN SATURATION: 97 % | RESPIRATION RATE: 14 BRPM | SYSTOLIC BLOOD PRESSURE: 173 MMHG

## 2022-01-01 VITALS
OXYGEN SATURATION: 95 % | RESPIRATION RATE: 18 BRPM | SYSTOLIC BLOOD PRESSURE: 124 MMHG | DIASTOLIC BLOOD PRESSURE: 97 MMHG | HEART RATE: 92 BPM | TEMPERATURE: 98.7 F

## 2022-01-01 VITALS
BODY MASS INDEX: 18.73 KG/M2 | OXYGEN SATURATION: 97 % | DIASTOLIC BLOOD PRESSURE: 69 MMHG | SYSTOLIC BLOOD PRESSURE: 141 MMHG | HEIGHT: 68 IN | TEMPERATURE: 98.4 F | WEIGHT: 123.6 LBS | HEART RATE: 77 BPM

## 2022-01-01 VITALS
HEART RATE: 80 BPM | BODY MASS INDEX: 19.4 KG/M2 | WEIGHT: 128 LBS | DIASTOLIC BLOOD PRESSURE: 88 MMHG | HEIGHT: 68 IN | OXYGEN SATURATION: 99 % | TEMPERATURE: 97.9 F | RESPIRATION RATE: 18 BRPM | SYSTOLIC BLOOD PRESSURE: 122 MMHG

## 2022-01-01 VITALS — BODY MASS INDEX: 18.85 KG/M2 | TEMPERATURE: 97.8 F | HEIGHT: 68 IN | OXYGEN SATURATION: 94 % | WEIGHT: 124.4 LBS

## 2022-01-01 VITALS
SYSTOLIC BLOOD PRESSURE: 106 MMHG | HEART RATE: 78 BPM | RESPIRATION RATE: 18 BRPM | DIASTOLIC BLOOD PRESSURE: 69 MMHG | WEIGHT: 128 LBS | OXYGEN SATURATION: 96 % | BODY MASS INDEX: 19.4 KG/M2 | TEMPERATURE: 97.3 F | HEIGHT: 68 IN

## 2022-01-01 VITALS
HEART RATE: 74 BPM | HEIGHT: 68 IN | TEMPERATURE: 97.6 F | RESPIRATION RATE: 20 BRPM | WEIGHT: 124.4 LBS | OXYGEN SATURATION: 100 % | BODY MASS INDEX: 18.85 KG/M2 | DIASTOLIC BLOOD PRESSURE: 80 MMHG | SYSTOLIC BLOOD PRESSURE: 122 MMHG

## 2022-01-01 VITALS
BODY MASS INDEX: 19.55 KG/M2 | TEMPERATURE: 96.5 F | HEIGHT: 68 IN | WEIGHT: 129 LBS | RESPIRATION RATE: 16 BRPM | OXYGEN SATURATION: 96 % | HEART RATE: 85 BPM | DIASTOLIC BLOOD PRESSURE: 89 MMHG | SYSTOLIC BLOOD PRESSURE: 120 MMHG

## 2022-01-01 VITALS
TEMPERATURE: 96.4 F | HEIGHT: 68 IN | SYSTOLIC BLOOD PRESSURE: 135 MMHG | OXYGEN SATURATION: 99 % | HEART RATE: 82 BPM | RESPIRATION RATE: 16 BRPM | WEIGHT: 121.2 LBS | BODY MASS INDEX: 18.37 KG/M2 | DIASTOLIC BLOOD PRESSURE: 83 MMHG

## 2022-01-01 VITALS
OXYGEN SATURATION: 96 % | BODY MASS INDEX: 18.49 KG/M2 | SYSTOLIC BLOOD PRESSURE: 122 MMHG | WEIGHT: 122 LBS | HEART RATE: 69 BPM | HEIGHT: 68 IN | RESPIRATION RATE: 18 BRPM | DIASTOLIC BLOOD PRESSURE: 69 MMHG | TEMPERATURE: 97.1 F
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
## 2022-01-01 DIAGNOSIS — K56.49: ICD-10-CM

## 2022-01-01 DIAGNOSIS — K13.79 PAIN IN MOUTH: Primary | ICD-10-CM

## 2022-01-01 DIAGNOSIS — R14.0 ABDOMINAL DISTENTION: ICD-10-CM

## 2022-01-01 DIAGNOSIS — H40.003 GLAUCOMA SUSPECT, BILATERAL: ICD-10-CM

## 2022-01-01 DIAGNOSIS — F33.41 RECURRENT MAJOR DEPRESSION IN PARTIAL REMISSION (H): ICD-10-CM

## 2022-01-01 DIAGNOSIS — R45.1 AGITATION: ICD-10-CM

## 2022-01-01 DIAGNOSIS — F41.9 ANXIETY: ICD-10-CM

## 2022-01-01 DIAGNOSIS — E53.8 VITAMIN B 12 DEFICIENCY: ICD-10-CM

## 2022-01-01 DIAGNOSIS — F03.918 DEMENTIA WITH BEHAVIORAL DISTURBANCE (H): ICD-10-CM

## 2022-01-01 DIAGNOSIS — K59.01 SLOW TRANSIT CONSTIPATION: ICD-10-CM

## 2022-01-01 DIAGNOSIS — N18.31 STAGE 3A CHRONIC KIDNEY DISEASE (H): ICD-10-CM

## 2022-01-01 DIAGNOSIS — I10 ESSENTIAL (PRIMARY) HYPERTENSION: ICD-10-CM

## 2022-01-01 DIAGNOSIS — K59.01 SLOW TRANSIT CONSTIPATION: Primary | ICD-10-CM

## 2022-01-01 DIAGNOSIS — S09.90XA CLOSED HEAD INJURY, INITIAL ENCOUNTER: ICD-10-CM

## 2022-01-01 DIAGNOSIS — E03.9 HYPOTHYROIDISM, UNSPECIFIED: ICD-10-CM

## 2022-01-01 DIAGNOSIS — R52 GENERALIZED PAIN: ICD-10-CM

## 2022-01-01 DIAGNOSIS — Z95.0 CARDIAC PACEMAKER IN SITU: ICD-10-CM

## 2022-01-01 DIAGNOSIS — K59.00 CONSTIPATION, UNSPECIFIED CONSTIPATION TYPE: ICD-10-CM

## 2022-01-01 DIAGNOSIS — I10 BENIGN ESSENTIAL HYPERTENSION: ICD-10-CM

## 2022-01-01 DIAGNOSIS — F41.9 ANXIETY: Primary | ICD-10-CM

## 2022-01-01 DIAGNOSIS — I48.20 CHRONIC ATRIAL FIBRILLATION (H): ICD-10-CM

## 2022-01-01 DIAGNOSIS — K56.49: Primary | ICD-10-CM

## 2022-01-01 DIAGNOSIS — E03.9 ACQUIRED HYPOTHYROIDISM: ICD-10-CM

## 2022-01-01 DIAGNOSIS — U07.1 COVID-19: ICD-10-CM

## 2022-01-01 DIAGNOSIS — M81.0 AGE-RELATED OSTEOPOROSIS WITHOUT CURRENT PATHOLOGICAL FRACTURE: ICD-10-CM

## 2022-01-01 DIAGNOSIS — F03.91 DEMENTIA WITH BEHAVIORAL DISTURBANCE, UNSPECIFIED DEMENTIA TYPE: ICD-10-CM

## 2022-01-01 DIAGNOSIS — F33.1 MODERATE EPISODE OF RECURRENT MAJOR DEPRESSIVE DISORDER (H): ICD-10-CM

## 2022-01-01 DIAGNOSIS — F03.90 DEMENTIA, UNSPECIFIED DEMENTIA SEVERITY, UNSPECIFIED DEMENTIA TYPE, UNSPECIFIED WHETHER BEHAVIORAL, PSYCHOTIC, OR MOOD DISTURBANCE OR ANXIETY (H): ICD-10-CM

## 2022-01-01 DIAGNOSIS — H35.30 MACULAR DEGENERATION (SENILE) OF RETINA: ICD-10-CM
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
## 2022-01-01 DIAGNOSIS — G47.00 INSOMNIA, UNSPECIFIED TYPE: ICD-10-CM

## 2022-01-01 DIAGNOSIS — K13.79 PAIN IN MOUTH: ICD-10-CM

## 2022-01-01 LAB
ALBUMIN SERPL-MCNC: 3.8 G/DL (ref 3.4–5)
ALP SERPL-CCNC: 73 U/L (ref 40–150)
ALT SERPL W P-5'-P-CCNC: 16 U/L (ref 0–50)
ANION GAP SERPL CALCULATED.3IONS-SCNC: 6 MMOL/L (ref 3–14)
AST SERPL W P-5'-P-CCNC: 20 U/L (ref 0–45)
BASOPHILS # BLD AUTO: 0 10E3/UL (ref 0–0.2)
BASOPHILS NFR BLD AUTO: 0 %
BILIRUB SERPL-MCNC: 0.5 MG/DL (ref 0.2–1.3)
BUN SERPL-MCNC: 22 MG/DL (ref 7–30)
CALCIUM SERPL-MCNC: 9 MG/DL (ref 8.5–10.1)
CHLORIDE BLD-SCNC: 110 MMOL/L (ref 94–109)
CO2 SERPL-SCNC: 27 MMOL/L (ref 20–32)
CREAT SERPL-MCNC: 1.11 MG/DL (ref 0.52–1.04)
EOSINOPHIL # BLD AUTO: 0.1 10E3/UL (ref 0–0.7)
EOSINOPHIL NFR BLD AUTO: 2 %
ERYTHROCYTE [DISTWIDTH] IN BLOOD BY AUTOMATED COUNT: 14 % (ref 10–15)
GFR SERPL CREATININE-BSD FRML MDRD: 48 ML/MIN/1.73M2
GLUCOSE BLD-MCNC: 96 MG/DL (ref 70–99)
HCT VFR BLD AUTO: 39 % (ref 35–47)
HGB BLD-MCNC: 12.5 G/DL (ref 11.7–15.7)
IMM GRANULOCYTES # BLD: 0 10E3/UL
IMM GRANULOCYTES NFR BLD: 0 %
LIPASE SERPL-CCNC: 177 U/L (ref 73–393)
LYMPHOCYTES # BLD AUTO: 1.6 10E3/UL (ref 0.8–5.3)
LYMPHOCYTES NFR BLD AUTO: 26 %
MCH RBC QN AUTO: 32.1 PG (ref 26.5–33)
MCHC RBC AUTO-ENTMCNC: 32.1 G/DL (ref 31.5–36.5)
MCV RBC AUTO: 100 FL (ref 78–100)
MONOCYTES # BLD AUTO: 0.4 10E3/UL (ref 0–1.3)
MONOCYTES NFR BLD AUTO: 7 %
NEUTROPHILS # BLD AUTO: 4.1 10E3/UL (ref 1.6–8.3)
NEUTROPHILS NFR BLD AUTO: 65 %
NRBC # BLD AUTO: 0 10E3/UL
NRBC BLD AUTO-RTO: 0 /100
PLATELET # BLD AUTO: 174 10E3/UL (ref 150–450)
POTASSIUM BLD-SCNC: 4.1 MMOL/L (ref 3.4–5.3)
PROT SERPL-MCNC: 6.9 G/DL (ref 6.8–8.8)
RBC # BLD AUTO: 3.89 10E6/UL (ref 3.8–5.2)
SARS-COV-2 RNA RESP QL NAA+PROBE: NEGATIVE
SODIUM SERPL-SCNC: 143 MMOL/L (ref 133–144)
TSH SERPL DL<=0.005 MIU/L-ACNC: 2.49 MU/L (ref 0.4–4)
WBC # BLD AUTO: 6.3 10E3/UL (ref 4–11)

## 2022-01-01 PROCEDURE — 83690 ASSAY OF LIPASE: CPT | Performed by: EMERGENCY MEDICINE

## 2022-01-01 PROCEDURE — 250N000011 HC RX IP 250 OP 636: Performed by: EMERGENCY MEDICINE

## 2022-01-01 PROCEDURE — U0005 INFEC AGEN DETEC AMPLI PROBE: HCPCS | Mod: ORL | Performed by: INTERNAL MEDICINE

## 2022-01-01 PROCEDURE — 36415 COLL VENOUS BLD VENIPUNCTURE: CPT | Performed by: EMERGENCY MEDICINE

## 2022-01-01 PROCEDURE — 74177 CT ABD & PELVIS W/CONTRAST: CPT

## 2022-01-01 PROCEDURE — 250N000009 HC RX 250: Performed by: EMERGENCY MEDICINE

## 2022-01-01 PROCEDURE — 70450 CT HEAD/BRAIN W/O DYE: CPT | Mod: MA

## 2022-01-01 PROCEDURE — 96375 TX/PRO/DX INJ NEW DRUG ADDON: CPT

## 2022-01-01 PROCEDURE — 36415 COLL VENOUS BLD VENIPUNCTURE: CPT | Mod: ORL | Performed by: NURSE PRACTITIONER

## 2022-01-01 PROCEDURE — 84443 ASSAY THYROID STIM HORMONE: CPT | Mod: ORL | Performed by: NURSE PRACTITIONER

## 2022-01-01 PROCEDURE — 80053 COMPREHEN METABOLIC PANEL: CPT | Performed by: EMERGENCY MEDICINE

## 2022-01-01 PROCEDURE — 96361 HYDRATE IV INFUSION ADD-ON: CPT

## 2022-01-01 PROCEDURE — 96374 THER/PROPH/DIAG INJ IV PUSH: CPT | Mod: 59

## 2022-01-01 PROCEDURE — 99285 EMERGENCY DEPT VISIT HI MDM: CPT | Mod: 25

## 2022-01-01 PROCEDURE — 250N000011 HC RX IP 250 OP 636

## 2022-01-01 PROCEDURE — 258N000003 HC RX IP 258 OP 636: Performed by: EMERGENCY MEDICINE

## 2022-01-01 PROCEDURE — 85025 COMPLETE CBC W/AUTO DIFF WBC: CPT | Performed by: EMERGENCY MEDICINE

## 2022-01-01 PROCEDURE — 96372 THER/PROPH/DIAG INJ SC/IM: CPT | Performed by: EMERGENCY MEDICINE

## 2022-01-01 PROCEDURE — P9604 ONE-WAY ALLOW PRORATED TRIP: HCPCS | Mod: ORL | Performed by: NURSE PRACTITIONER

## 2022-01-01 RX ORDER — SODIUM PHOSPHATE,MONO-DIBASIC 19G-7G/118
ENEMA (ML) RECTAL
Qty: 399 ML | Status: SHIPPED | OUTPATIENT
Start: 2022-01-01

## 2022-01-01 RX ORDER — LACTULOSE 20 G/30ML
10 SOLUTION ORAL
Qty: 473 ML | Refills: 11 | Status: SHIPPED | OUTPATIENT
Start: 2022-01-01 | End: 2022-01-01

## 2022-01-01 RX ORDER — CHLORHEXIDINE GLUCONATE ORAL RINSE 1.2 MG/ML
15 SOLUTION DENTAL 2 TIMES DAILY
COMMUNITY
Start: 2022-01-01 | End: 2022-01-01

## 2022-01-01 RX ORDER — VIT A/VIT C/VIT E/ZINC/COPPER 4296-226
CAPSULE ORAL
Qty: 180 CAPSULE | Refills: 97 | Status: SHIPPED | OUTPATIENT
Start: 2022-01-01 | End: 2022-01-01

## 2022-01-01 RX ORDER — MIRTAZAPINE 15 MG/1
TABLET, FILM COATED ORAL
Qty: 90 TABLET | Refills: 3 | Status: SHIPPED | OUTPATIENT
Start: 2022-01-01

## 2022-01-01 RX ORDER — METOPROLOL TARTRATE 25 MG/1
25 TABLET, FILM COATED ORAL 2 TIMES DAILY
Qty: 30 TABLET | Refills: 11 | Status: SHIPPED | OUTPATIENT
Start: 2022-01-01 | End: 2022-01-01

## 2022-01-01 RX ORDER — UBIDECARENONE 75 MG
CAPSULE ORAL
Qty: 28 TABLET | Refills: 97 | Status: SHIPPED | OUTPATIENT
Start: 2022-01-01 | End: 2022-01-01

## 2022-01-01 RX ORDER — SERTRALINE HYDROCHLORIDE 25 MG/1
25 TABLET, FILM COATED ORAL DAILY
Qty: 30 TABLET | Refills: 11 | Status: SHIPPED | OUTPATIENT
Start: 2022-01-01 | End: 2023-01-01

## 2022-01-01 RX ORDER — CHOLECALCIFEROL (VITAMIN D3) 25 MCG
TABLET ORAL
Qty: 90 TABLET | Refills: 97 | Status: SHIPPED | OUTPATIENT
Start: 2022-01-01 | End: 2022-01-01

## 2022-01-01 RX ORDER — LEVOTHYROXINE SODIUM 88 UG/1
TABLET ORAL
Qty: 90 TABLET | Refills: 97 | Status: SHIPPED | OUTPATIENT
Start: 2022-01-01

## 2022-01-01 RX ORDER — MAG HYDROX/ALUMINUM HYD/SIMETH 400-400-40
SUSPENSION, ORAL (FINAL DOSE FORM) ORAL
Qty: 710 ML | Refills: 97 | Status: SHIPPED | OUTPATIENT
Start: 2022-01-01 | End: 2022-01-01

## 2022-01-01 RX ORDER — LORAZEPAM 0.5 MG/1
1 TABLET ORAL DAILY PRN
Qty: 30 TABLET | Refills: 3 | COMMUNITY
Start: 2022-01-01 | End: 2023-01-01

## 2022-01-01 RX ORDER — LORAZEPAM 0.5 MG/1
0.25 TABLET ORAL DAILY PRN
Qty: 30 TABLET | Refills: 1 | Status: SHIPPED | OUTPATIENT
Start: 2022-01-01 | End: 2022-01-01

## 2022-01-01 RX ORDER — MORPHINE SULFATE 2 MG/ML
2 INJECTION, SOLUTION INTRAMUSCULAR; INTRAVENOUS
Status: DISCONTINUED | OUTPATIENT
Start: 2022-01-01 | End: 2022-01-01 | Stop reason: HOSPADM

## 2022-01-01 RX ORDER — IOPAMIDOL 755 MG/ML
62 INJECTION, SOLUTION INTRAVASCULAR ONCE
Status: COMPLETED | OUTPATIENT
Start: 2022-01-01 | End: 2022-01-01

## 2022-01-01 RX ORDER — BRINZOLAMIDE 10 MG/ML
SUSPENSION/ DROPS OPHTHALMIC
Qty: 10 ML | Refills: 97 | Status: SHIPPED | OUTPATIENT
Start: 2022-01-01 | End: 2022-01-01

## 2022-01-01 RX ORDER — ESCITALOPRAM OXALATE 10 MG/1
TABLET ORAL
Qty: 90 TABLET | Refills: 3 | Status: SHIPPED | OUTPATIENT
Start: 2022-01-01 | End: 2022-01-01

## 2022-01-01 RX ORDER — ACETAMINOPHEN 500 MG
TABLET ORAL
Qty: 186 TABLET | Refills: 97 | Status: SHIPPED | OUTPATIENT
Start: 2022-01-01 | End: 2023-01-01

## 2022-01-01 RX ORDER — LORAZEPAM 0.5 MG/1
0.5 TABLET ORAL DAILY PRN
Qty: 30 TABLET | Refills: 3 | Status: SHIPPED | OUTPATIENT
Start: 2022-01-01 | End: 2022-01-01

## 2022-01-01 RX ORDER — APIXABAN 2.5 MG/1
TABLET, FILM COATED ORAL
Qty: 56 TABLET | Refills: 97 | Status: SHIPPED | OUTPATIENT
Start: 2022-01-01 | End: 2023-01-01

## 2022-01-01 RX ORDER — AMOXICILLIN 250 MG
CAPSULE ORAL
Qty: 112 TABLET | Refills: 97 | Status: SHIPPED | OUTPATIENT
Start: 2022-01-01

## 2022-01-01 RX ORDER — LORAZEPAM 2 MG/ML
0.5 INJECTION INTRAMUSCULAR ONCE
Status: COMPLETED | OUTPATIENT
Start: 2022-01-01 | End: 2022-01-01

## 2022-01-01 RX ORDER — PSYLLIUM HUSK 0.4 G
CAPSULE ORAL
Qty: 28 TABLET | Refills: 97 | Status: SHIPPED | OUTPATIENT
Start: 2022-01-01 | End: 2023-01-01

## 2022-01-01 RX ORDER — LISINOPRIL 2.5 MG/1
2.5 TABLET ORAL DAILY
Qty: 90 TABLET | Refills: 3 | Status: SHIPPED | OUTPATIENT
Start: 2022-01-01 | End: 2022-01-01

## 2022-01-01 RX ORDER — TRAZODONE HYDROCHLORIDE 50 MG/1
TABLET, FILM COATED ORAL
Qty: 90 TABLET | Refills: 97 | Status: SHIPPED | OUTPATIENT
Start: 2022-01-01

## 2022-01-01 RX ORDER — POLYETHYLENE GLYCOL 3350 17 G/17G
POWDER, FOR SOLUTION ORAL
Qty: 510 G | Refills: 97 | Status: SHIPPED | OUTPATIENT
Start: 2022-01-01

## 2022-01-01 RX ORDER — METOPROLOL TARTRATE 25 MG/1
TABLET, FILM COATED ORAL
Qty: 180 TABLET | Refills: 97 | Status: SHIPPED | OUTPATIENT
Start: 2022-01-01

## 2022-01-01 RX ORDER — HALOPERIDOL 5 MG/ML
5 INJECTION INTRAMUSCULAR ONCE
Status: COMPLETED | OUTPATIENT
Start: 2022-01-01 | End: 2022-01-01

## 2022-01-01 RX ORDER — LORAZEPAM 2 MG/ML
INJECTION INTRAMUSCULAR
Status: COMPLETED
Start: 2022-01-01 | End: 2022-01-01

## 2022-01-01 RX ORDER — LACTULOSE 20 G/30ML
20 SOLUTION ORAL DAILY
Qty: 473 ML | Refills: 11 | Status: SHIPPED | OUTPATIENT
Start: 2022-01-01

## 2022-01-01 RX ADMIN — HALOPERIDOL LACTATE 5 MG: 5 INJECTION, SOLUTION INTRAMUSCULAR at 16:14

## 2022-01-01 RX ADMIN — LORAZEPAM 0.5 MG: 2 INJECTION INTRAMUSCULAR at 10:35

## 2022-01-01 RX ADMIN — SODIUM CHLORIDE 1000 ML: 9 INJECTION, SOLUTION INTRAVENOUS at 10:10

## 2022-01-01 RX ADMIN — IOPAMIDOL 62 ML: 755 INJECTION, SOLUTION INTRAVENOUS at 12:24

## 2022-01-01 RX ADMIN — MORPHINE SULFATE 2 MG: 2 INJECTION, SOLUTION INTRAMUSCULAR; INTRAVENOUS at 10:30

## 2022-01-01 RX ADMIN — LORAZEPAM 0.5 MG: 2 INJECTION INTRAMUSCULAR; INTRAVENOUS at 10:35

## 2022-01-01 RX ADMIN — SODIUM CHLORIDE 60 ML: 900 INJECTION INTRAVENOUS at 12:24

## 2022-01-01 ASSESSMENT — ACTIVITIES OF DAILY LIVING (ADL)
ADLS_ACUITY_SCORE: 17.25
ADLS_ACUITY_SCORE: 17.25

## 2022-01-17 ENCOUNTER — LAB REQUISITION (OUTPATIENT)
Dept: LAB | Facility: CLINIC | Age: 87
End: 2022-01-17
Payer: MEDICARE

## 2022-01-17 DIAGNOSIS — R41.82 ALTERED MENTAL STATUS, UNSPECIFIED: ICD-10-CM

## 2022-01-17 LAB
ALBUMIN UR-MCNC: 30 MG/DL
APPEARANCE UR: CLEAR
BILIRUB UR QL STRIP: NEGATIVE
COLOR UR AUTO: YELLOW
GLUCOSE UR STRIP-MCNC: NEGATIVE MG/DL
HGB UR QL STRIP: ABNORMAL
KETONES UR STRIP-MCNC: NEGATIVE MG/DL
LEUKOCYTE ESTERASE UR QL STRIP: ABNORMAL
MUCOUS THREADS #/AREA URNS LPF: PRESENT /LPF
NITRATE UR QL: NEGATIVE
PH UR STRIP: 7 [PH] (ref 5–7)
RBC URINE: 176 /HPF
SP GR UR STRIP: 1.03 (ref 1–1.03)
SQUAMOUS EPITHELIAL: <1 /HPF
UROBILINOGEN UR STRIP-MCNC: NORMAL MG/DL
WBC URINE: 20 /HPF

## 2022-01-17 PROCEDURE — 87086 URINE CULTURE/COLONY COUNT: CPT | Mod: ORL | Performed by: NURSE PRACTITIONER

## 2022-01-17 PROCEDURE — 81001 URINALYSIS AUTO W/SCOPE: CPT | Mod: ORL | Performed by: NURSE PRACTITIONER

## 2022-01-18 ASSESSMENT — MIFFLIN-ST. JEOR: SCORE: 1009.67

## 2022-01-19 ENCOUNTER — ASSISTED LIVING VISIT (OUTPATIENT)
Dept: GERIATRICS | Facility: CLINIC | Age: 87
End: 2022-01-19
Payer: MEDICARE

## 2022-01-19 VITALS
BODY MASS INDEX: 17.58 KG/M2 | TEMPERATURE: 97 F | SYSTOLIC BLOOD PRESSURE: 125 MMHG | RESPIRATION RATE: 16 BRPM | WEIGHT: 116 LBS | HEIGHT: 68 IN | DIASTOLIC BLOOD PRESSURE: 80 MMHG | HEART RATE: 81 BPM | OXYGEN SATURATION: 96 %

## 2022-01-19 DIAGNOSIS — F03.91 DEMENTIA WITH BEHAVIORAL DISTURBANCE, UNSPECIFIED DEMENTIA TYPE: Primary | ICD-10-CM

## 2022-01-19 DIAGNOSIS — F33.1 MODERATE EPISODE OF RECURRENT MAJOR DEPRESSIVE DISORDER (H): ICD-10-CM

## 2022-01-19 DIAGNOSIS — F41.9 ANXIETY: ICD-10-CM

## 2022-01-19 DIAGNOSIS — R45.1 AGITATION: ICD-10-CM

## 2022-01-19 DIAGNOSIS — N18.31 STAGE 3A CHRONIC KIDNEY DISEASE (H): ICD-10-CM

## 2022-01-19 DIAGNOSIS — I10 BENIGN ESSENTIAL HYPERTENSION: ICD-10-CM

## 2022-01-19 DIAGNOSIS — K59.01 SLOW TRANSIT CONSTIPATION: ICD-10-CM

## 2022-01-19 DIAGNOSIS — I48.20 CHRONIC ATRIAL FIBRILLATION (H): ICD-10-CM

## 2022-01-19 DIAGNOSIS — E46 PROTEIN-CALORIE MALNUTRITION, UNSPECIFIED SEVERITY (H): ICD-10-CM

## 2022-01-19 LAB — BACTERIA UR CULT: NORMAL

## 2022-01-19 RX ORDER — ESCITALOPRAM OXALATE 10 MG/1
10 TABLET ORAL EVERY MORNING
Qty: 30 TABLET | Refills: 4 | Status: SHIPPED | OUTPATIENT
Start: 2022-01-19 | End: 2022-01-01

## 2022-01-19 RX ORDER — QUETIAPINE FUMARATE 25 MG/1
TABLET, FILM COATED ORAL
Qty: 84 TABLET | Status: SHIPPED | OUTPATIENT
Start: 2022-01-19 | End: 2023-01-01

## 2022-01-19 RX ORDER — LISINOPRIL 2.5 MG/1
2.5 TABLET ORAL DAILY
Qty: 30 TABLET | Refills: 11 | Status: SHIPPED | OUTPATIENT
Start: 2022-01-19 | End: 2022-01-01
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
## 2022-01-19 NOTE — LETTER
Annabel May    1. Discontinue current dose of Seroquel and begin new dose below  2. Discontinue Sertraline   3. Abdominal x-ray and compare to previous PPX  7/29/21 for bowel impaction  4. BMP and CBC next week on lab day for HTN/CKD    5. escitalopram (LEXAPRO) 10 MG tablet Take 1 tablet (10 mg) by mouth every morning    6.  lisinopril (ZESTRIL) 2.5 MG tablet Take 1 tablet (2.5 mg) by mouth daily    QUEtiapine (SEROQUEL) 25 MG tablet Take 1.5 tablets (37.5 mg) by mouth 3 times daily. May also take 1 tablet (25 mg) 2 times daily as needed (aggression/dementia related psychosis).       ZAFAR Bae CNP on 1/19/2022 at 2:01 PM

## 2022-01-20 ENCOUNTER — TRANSFERRED RECORDS (OUTPATIENT)
Dept: HEALTH INFORMATION MANAGEMENT | Facility: CLINIC | Age: 87
End: 2022-01-20
Payer: MEDICARE

## 2022-01-20 DIAGNOSIS — K59.01 SLOW TRANSIT CONSTIPATION: ICD-10-CM

## 2022-01-20 DIAGNOSIS — F03.91 DEMENTIA WITH BEHAVIORAL DISTURBANCE, UNSPECIFIED DEMENTIA TYPE: ICD-10-CM

## 2022-01-20 RX ORDER — AMOXICILLIN 250 MG
CAPSULE ORAL
Qty: 124 TABLET | Refills: 97 | Status: SHIPPED | OUTPATIENT
Start: 2022-01-20 | End: 2022-01-01

## 2022-01-20 RX ORDER — POLYETHYLENE GLYCOL 3350 17 G/17G
POWDER, FOR SOLUTION ORAL
Qty: 510 G | Refills: 97 | COMMUNITY
Start: 2022-01-20 | End: 2022-01-01

## 2022-01-21 ENCOUNTER — LAB REQUISITION (OUTPATIENT)
Dept: LAB | Facility: CLINIC | Age: 87
End: 2022-01-21
Payer: MEDICARE

## 2022-01-21 DIAGNOSIS — K59.00 CONSTIPATION, UNSPECIFIED CONSTIPATION TYPE: Primary | ICD-10-CM

## 2022-01-21 DIAGNOSIS — U07.1 COVID-19: ICD-10-CM

## 2022-01-21 PROCEDURE — U0003 INFECTIOUS AGENT DETECTION BY NUCLEIC ACID (DNA OR RNA); SEVERE ACUTE RESPIRATORY SYNDROME CORONAVIRUS 2 (SARS-COV-2) (CORONAVIRUS DISEASE [COVID-19]), AMPLIFIED PROBE TECHNIQUE, MAKING USE OF HIGH THROUGHPUT TECHNOLOGIES AS DESCRIBED BY CMS-2020-01-R: HCPCS | Mod: ORL | Performed by: INTERNAL MEDICINE

## 2022-01-21 NOTE — PROGRESS NOTES
Addendum: facility would like supp scheduled every 3 days. Prior they would use daily prn to accomplish this and she was less agitated. Long history of constipation, sluggish bowels and large colon now mostly reliant on medications to move her bowel.     Plan updated today:    1. Begin bisacodyl suppository every 3 days for recurring bowel impactions and daily prn.       Resident with history of constipation and recently with daily loose stool and increased agitation. Imaging results below:         Plan:  1. Give prn Enema STAT once and if not effective give another enema the next day and then continue daily prn  2. Continue with current orders for Miralax MIX 17GM OF POWDER IN 8OZ OF WATER UNTIL COMPLETELY DISSOLVED. DRINK SOLUTION DAILY;& GIVE 2ND DOSE FOR TWICE DAILY ON MONDAY, WEDNESDAY AND FRIDAY. Do not hold for loose stool and update provider if having loose stool for possible impaction  3. Continue with current dose of Senna S TAKE TWO TABLETS BY MOUTH TWICE DAILY. Do not hold for loose stool and update provider for possible bowel impaction   4. Offer prune juice daily with one meal    ZAFAR Bae CNP on 1/20/2022 at 8:31 PM

## 2022-01-22 LAB — SARS-COV-2 RNA RESP QL NAA+PROBE: NEGATIVE

## 2022-01-23 RX ORDER — SODIUM PHOSPHATE,MONO-DIBASIC 19G-7G/118
ENEMA (ML) RECTAL
Qty: 399 ML | Status: SHIPPED | OUTPATIENT
Start: 2022-01-23 | End: 2022-01-01

## 2022-01-26 ENCOUNTER — LAB REQUISITION (OUTPATIENT)
Dept: LAB | Facility: CLINIC | Age: 87
End: 2022-01-26
Payer: MEDICARE

## 2022-01-26 DIAGNOSIS — U07.1 COVID-19: ICD-10-CM

## 2022-01-26 DIAGNOSIS — I10 ESSENTIAL (PRIMARY) HYPERTENSION: ICD-10-CM

## 2022-01-26 PROCEDURE — U0003 INFECTIOUS AGENT DETECTION BY NUCLEIC ACID (DNA OR RNA); SEVERE ACUTE RESPIRATORY SYNDROME CORONAVIRUS 2 (SARS-COV-2) (CORONAVIRUS DISEASE [COVID-19]), AMPLIFIED PROBE TECHNIQUE, MAKING USE OF HIGH THROUGHPUT TECHNOLOGIES AS DESCRIBED BY CMS-2020-01-R: HCPCS | Mod: ORL | Performed by: INTERNAL MEDICINE

## 2022-01-26 RX ORDER — BISACODYL 10 MG
SUPPOSITORY, RECTAL RECTAL
Qty: 10 SUPPOSITORY | Refills: 97 | Status: SHIPPED | OUTPATIENT
Start: 2022-01-26 | End: 2023-01-01

## 2022-01-27 LAB
ANION GAP SERPL CALCULATED.3IONS-SCNC: 5 MMOL/L (ref 3–14)
BASOPHILS # BLD AUTO: 0 10E3/UL (ref 0–0.2)
BASOPHILS NFR BLD AUTO: 0 %
BUN SERPL-MCNC: 17 MG/DL (ref 7–30)
CALCIUM SERPL-MCNC: 9.1 MG/DL (ref 8.5–10.1)
CHLORIDE BLD-SCNC: 111 MMOL/L (ref 94–109)
CO2 SERPL-SCNC: 25 MMOL/L (ref 20–32)
CREAT SERPL-MCNC: 0.86 MG/DL (ref 0.52–1.04)
EOSINOPHIL # BLD AUTO: 0.2 10E3/UL (ref 0–0.7)
EOSINOPHIL NFR BLD AUTO: 3 %
ERYTHROCYTE [DISTWIDTH] IN BLOOD BY AUTOMATED COUNT: 13.7 % (ref 10–15)
GFR SERPL CREATININE-BSD FRML MDRD: 65 ML/MIN/1.73M2
GLUCOSE BLD-MCNC: 56 MG/DL (ref 70–99)
HCT VFR BLD AUTO: 42.3 % (ref 35–47)
HGB BLD-MCNC: 12.6 G/DL (ref 11.7–15.7)
IMM GRANULOCYTES # BLD: 0 10E3/UL
IMM GRANULOCYTES NFR BLD: 0 %
LYMPHOCYTES # BLD AUTO: 1.6 10E3/UL (ref 0.8–5.3)
LYMPHOCYTES NFR BLD AUTO: 30 %
MCH RBC QN AUTO: 32.8 PG (ref 26.5–33)
MCHC RBC AUTO-ENTMCNC: 29.8 G/DL (ref 31.5–36.5)
MCV RBC AUTO: 110 FL (ref 78–100)
MONOCYTES # BLD AUTO: 0.5 10E3/UL (ref 0–1.3)
MONOCYTES NFR BLD AUTO: 9 %
NEUTROPHILS # BLD AUTO: 3 10E3/UL (ref 1.6–8.3)
NEUTROPHILS NFR BLD AUTO: 58 %
NRBC # BLD AUTO: 0 10E3/UL
NRBC BLD AUTO-RTO: 0 /100
PLATELET # BLD AUTO: 184 10E3/UL (ref 150–450)
POTASSIUM BLD-SCNC: 3.9 MMOL/L (ref 3.4–5.3)
RBC # BLD AUTO: 3.84 10E6/UL (ref 3.8–5.2)
SARS-COV-2 RNA RESP QL NAA+PROBE: NEGATIVE
SODIUM SERPL-SCNC: 141 MMOL/L (ref 133–144)
WBC # BLD AUTO: 5.3 10E3/UL (ref 4–11)

## 2022-01-27 PROCEDURE — 80048 BASIC METABOLIC PNL TOTAL CA: CPT | Mod: ORL | Performed by: INTERNAL MEDICINE

## 2022-01-27 PROCEDURE — 85025 COMPLETE CBC W/AUTO DIFF WBC: CPT | Mod: ORL | Performed by: INTERNAL MEDICINE

## 2022-01-27 PROCEDURE — 36415 COLL VENOUS BLD VENIPUNCTURE: CPT | Mod: ORL | Performed by: INTERNAL MEDICINE

## 2022-01-27 PROCEDURE — P9604 ONE-WAY ALLOW PRORATED TRIP: HCPCS | Mod: ORL | Performed by: INTERNAL MEDICINE

## 2022-02-02 ENCOUNTER — LAB REQUISITION (OUTPATIENT)
Dept: LAB | Facility: CLINIC | Age: 87
End: 2022-02-02
Payer: MEDICARE

## 2022-02-02 DIAGNOSIS — U07.1 COVID-19: ICD-10-CM

## 2022-02-02 PROCEDURE — U0003 INFECTIOUS AGENT DETECTION BY NUCLEIC ACID (DNA OR RNA); SEVERE ACUTE RESPIRATORY SYNDROME CORONAVIRUS 2 (SARS-COV-2) (CORONAVIRUS DISEASE [COVID-19]), AMPLIFIED PROBE TECHNIQUE, MAKING USE OF HIGH THROUGHPUT TECHNOLOGIES AS DESCRIBED BY CMS-2020-01-R: HCPCS | Mod: ORL | Performed by: INTERNAL MEDICINE

## 2022-02-03 LAB — SARS-COV-2 RNA RESP QL NAA+PROBE: NOT DETECTED

## 2022-02-08 ENCOUNTER — LAB REQUISITION (OUTPATIENT)
Dept: LAB | Facility: CLINIC | Age: 87
End: 2022-02-08
Payer: MEDICARE

## 2022-02-08 DIAGNOSIS — U07.1 COVID-19: ICD-10-CM

## 2022-02-08 PROCEDURE — U0005 INFEC AGEN DETEC AMPLI PROBE: HCPCS | Mod: ORL | Performed by: INTERNAL MEDICINE

## 2022-02-09 LAB — SARS-COV-2 RNA RESP QL NAA+PROBE: NOT DETECTED

## 2022-02-26 DIAGNOSIS — E53.8 VITAMIN B 12 DEFICIENCY: ICD-10-CM

## 2022-02-26 DIAGNOSIS — G47.00 INSOMNIA, UNSPECIFIED TYPE: ICD-10-CM

## 2022-02-26 DIAGNOSIS — I48.20 CHRONIC ATRIAL FIBRILLATION (H): ICD-10-CM

## 2022-03-16 NOTE — PROGRESS NOTES
New Orders for Annabel May related to nerve root pain in mouth    1. Discontinue current Tylenol and begin   acetaminophen (ACETAMINOPHEN EXTRA STRENGTH) 500 MG tablet TAKE TWO TABLETS (1000MG) BY MOUTH TWICE DAILY;& TAKE (1000MG) BY MOUTH DAILY AS NEEDED FOR PAIN    2. benzocaine (ANBESOL) 10 % gel Take by mouth 3 times daily and once as needed for moderate pain     ZAFAR Bae CNP on 3/16/2022 at 4:42 PM

## 2022-04-11 NOTE — PROGRESS NOTES
[place] [facility]  [place] Medical Record Number:  [id number]  Place of Service where encounter took place:  [facility] ([facility]) [[id number]]  Chief Complaint   Patient presents with     RECHECK     Routine       HPI:    Annabel May  is a 87 year old (5/12/1934), who is being seen today for an episodic care visit.  HPI information obtained from: facility chart records, facility staff and [facility] chart review. Today's concern is:    Seen today for follow up to chronic conditions. Staff would like to move away from suppository use every 3 days as more resistant to cares. History of  constipation/fecal impaction, diarrhea. Also with agitation and aggression 2/2 advanced dementia. Loss of most language.  No recent behaviors noted in chart review. As needed Seroquel last used 4/3/22. Also with recent tooth extraction. Staff reporting no concerns with eating or drinking. Will hold her cheek if it's causing discomfort.     Past Medical and Surgical History reviewed in Epic today.    MEDICATIONS:    Current Outpatient Medications   Medication Sig Dispense Refill     acetaminophen (ACETAMINOPHEN EXTRA STRENGTH) 500 MG tablet TAKE TWO TABLETS (1000MG) BY MOUTH TWICE DAILY;& TAKE (1000MG)  BY MOUTH DAILY AS NEEDED FOR PAIN 186 tablet 97     AZOPT 1 % ophthalmic suspension INSTILL ONE DROP IN EACH EYE TWICE DAILY 10 mL 97     benzocaine (ANBESOL) 10 % gel Take by mouth 3 times daily And once daily prn 9 g 3     bisacodyl (DULCOLAX) 10 MG suppository UNWRAP AND INSERT ONE SUPPOSITORY RECTALLY EVERY THREE DAYS AND DAILY AS NEEDED FOR CONSTIPATION 10 suppository 97     ELIQUIS ANTICOAGULANT 2.5 MG tablet TAKE 1 TABLET BY MOUTH TWICE DAILY 56 tablet 97     escitalopram (LEXAPRO) 10 MG tablet Take 1 tablet (10 mg) by mouth every morning 30 tablet 4     latanoprost (XALATAN) 0.005 % ophthalmic solution INSTILL ONE DROP IN EACH EYE AT BEDTIME 2.5 mL 97     levothyroxine (SYNTHROID/LEVOTHROID)  "88 MCG tablet TAKE 1 TABLET BY MOUTH ONCE DAILY 90 tablet 3     lisinopril (ZESTRIL) 2.5 MG tablet Take 1 tablet (2.5 mg) by mouth daily 30 tablet 11     magnesium hydroxide (MILK OF MAGNESIA) 400 MG/5ML suspension Take 30 mLs by mouth three times a week 473 mL 11     MELATONIN MAXIMUM STRENGTH 5 MG tablet TAKE 1 TABLET BY MOUTH AT BEDTIME 28 tablet 97     metoprolol tartrate (LOPRESSOR) 25 MG tablet Take 0.5 tablets (12.5 mg) by mouth 2 times daily 30 tablet 11     mirtazapine (REMERON) 15 MG tablet TAKE 1 TABLET BY MOUTH AT BEDTIME  28 tablet 97     Multiple Vitamins-Minerals (PRESERVISION AREDS) TABS Take 1 tablet by mouth 2 times daily 30 tablet 11     polyethylene glycol (MIRALAX) 17 GM/Dose powder MIX 17GM OF POWDER IN 8OZ OF WATER UNTIL COMPLETELY DISSOLVED. DRINK SOLUTION DAILY;& GIVE 2ND DOSE TWICE DAILY ON MONDAY, WEDNESDAY AND FRIDAY 510 g 97     QUEtiapine (SEROQUEL) 25 MG tablet Take 1.5 tablets (37.5 mg) by mouth 3 times daily. May also take 1 tablet (25 mg) 2 times daily as needed (aggression/dementia related psychosis). 84 tablet PRN     senna-docusate (SENEXON-S) 8.6-50 MG tablet TAKE TWO TABLETS BY MOUTH TWICE DAILY. DO NOT HOLD FOR LOOSE STOOL AND UPDATE PROVIDER FOR POSSIBLEBOWEL IMPACTION. 112 tablet 97     Sodium Phosphates (ENEMA) 7-19 GM/118ML ENEM USE AS DIRECTED DAILY AS NEEDED FOR STOOL IMPACTION 399 mL PRN     traZODone (DESYREL) 50 MG tablet TAKE 1 TABLET BY MOUTH AT BEDTIME 90 tablet 3     Vitamin D3 (CHOLECALCIFEROL) 25 mcg (1000 units) tablet Take 1 tablet (25 mcg) by mouth daily 30 tablet 11     REVIEW OF SYSTEMS:  Limited secondary to cognitive impairment but today pt reports ok    Objective:  /83   Pulse 82   Temp (!) 96.4  F (35.8  C)   Resp 16   Ht 1.727 m (5' 8\")   Wt 55 kg (121 lb 3.2 oz)   SpO2 99%   BMI 18.43 kg/m    Exam:  GENERAL APPEARANCE:  Alert, in no distress, anxious at baseline, not tearful today but worried look on face  ENT:  Mouth and posterior " oropharynx normal, dry mucous membranes, normal hearing acuity  EYES: lids, pupils and irises normal  RESP:  respiratory effort and palpation of chest normal, lungs clear to auscultation   CV:  Palpation and auscultation of heart done , regular rate and rhythm, no murmur, rub, or gallop, no edema  ABDOMEN: BS, abdominal area is rounded and slightly distended above baseline and firm. Hypoactive BS  M/S:   Gait and station at baseline-ambulates w/o assistive device-mostly steady    SKIN:  Inspection of skin and subcutaneous tissue baseline to visualized areas. Cyst/nodule hard on index finger right hand  NEURO:   Cranial nerves 2-12 are normal tested and grossly at patient's baseline  PSYCH:  insight and judgement impaired, memory impaired     Labs:   CBC RESULTS: Recent Labs   Lab Test 01/27/22  0905 10/21/21  0708   WBC 5.3 4.5   RBC 3.84 3.47*   HGB 12.6 11.3*   HCT 42.3 36.6   * 106*   MCH 32.8 32.6   MCHC 29.8* 30.9*   RDW 13.7 13.5    149*       Last Basic Metabolic Panel:  Recent Labs   Lab Test 01/27/22  0905 10/21/21  0708    145*   POTASSIUM 3.9 4.2   CHLORIDE 111* 115*   AMERICO 9.1 8.2*   CO2 25 24   BUN 17 18   CR 0.86 0.94   GLC 56* 80       Liver Function Studies -   Recent Labs   Lab Test 10/21/21  0708 01/06/20  1530   PROTTOTAL 6.1* 7.7   ALBUMIN 3.2* 4.4   BILITOTAL 0.4 0.9   ALKPHOS 63 66   AST 15 31   ALT 12 29       TSH   Date Value Ref Range Status   10/21/2021 3.99 0.40 - 4.00 mU/L Final   12/23/2020 4.80 (H) 0.40 - 4.00 mU/L Final   11/25/2020 8.30 (H) 0.40 - 4.00 mU/L Final       No results found for: A1C    ASSESSMENT/PLAN:  (K59.01) Slow transit constipation  Comment: hx of bowel impaction, digging for stool  Plan:  -beginning MOM 30 mL 3x weekly  -Miralax daily and twice daily Mon/Wed/Fri  -Senna S 2 tabs po twice daily   -Enema daily prn   -bisacodyl supp every 3 days and daily prn (consider back to daily prn if MOM effective)     (F03.91) Dementia with behavioral  disturbance, unspecified dementia type (H)  (primary encounter diagnosis)  (F33.1) Moderate episode of recurrent major depressive disorder (H)  (F41.9) Anxiety  (R45.1) Agitation  Comment: ongoing behaviors- no longer recognizes her son. Asking for her mother and father  Plan:   -discontinued Sertraline and started Escitalopram 10 mg po daily at previous visit 1/2022  -Seroquel to 37.5 mg po TID and 25 mg po BID prn   -remind staff that matching her tone escalates her behavior   -if refuses medications give her some time then re-attempt. Reviewed medications for discontinuing and will stop vitamin B12 to reduce pill load     (I48.20) Chronic atrial fibrillation (H)  (N18.31) Stage 3a chronic kidney disease (H)  (I10) Benign essential hypertension  Comment: ongoing-pacer present   Plan:   -added Lisinopril 2.5 mg po daily at HS previous visit   -BMP/CBC periodically   -eliquis 2.5 mg po BID   -metoprolol 12.5 mg po BID    (E53.8) Vitamin B12 deficiency   Comment: stopped supplement to reduce pill load   Plan:  -B12 labs with next set of labwork    (K13.79) Pain in mouth  Comment: continues at times per staff. S/P tooth extraction  Plan:  -continue current plan of care for pain            Electronically signed by:  ZAFAR Bae CNP

## 2022-04-11 NOTE — LETTER
New orders for Annabel May    1. Discontinue Vitamin B-12  2. Begin   magnesium hydroxide (MILK OF MAGNESIA) 400 MG/5ML suspension Take 30 mLs by mouth three times a week       ZAFAR Bae CNP on 4/11/2022 at 4:01 PM

## 2022-06-15 NOTE — PROGRESS NOTES
[facility]  [place] Medical Record Number:  [id number]  Place of Service where encounter took place:  [facility] [[id number]]  Chief Complaint   Patient presents with     RECHECK     Addendum: X-ray impression with mildly prominent colonic bowel gas pattern suggesting an ileus. Will need higher level of care for management       HPI:    Annabel May  is a 88 year old (5/12/1934), who is being seen today for an episodic care visit.  HPI information obtained from: facility staff. Today's concern is:    Resident with hx of fecal impaction, constipation now distended above baseline. No emesis, but is complaining of stomach pain and that could be related to her increased agitation and aggression. Urinating but wondering if retention 2/2 constipation also occurring? Staff thinks she could have missed part of her regular bowel program over the past few days. Facility notes indicate enema was given 6/8 with some results. She received a supp on 6/13 then enema again on 6/14 resulting in medium BM but still distended above baseline so was given a enema and supp today along with MOM and prune juice. Toilets herself and no record of urination.    Past Medical and Surgical History reviewed in Epic today.    MEDICATIONS:    Current Outpatient Medications   Medication Sig Dispense Refill     acetaminophen (ACETAMINOPHEN EXTRA STRENGTH) 500 MG tablet TAKE TWO TABLETS (1000MG) BY MOUTH TWICE DAILY;& TAKE (1000MG)  BY MOUTH DAILY AS NEEDED FOR PAIN 186 tablet 97     AZOPT 1 % ophthalmic suspension INSTILL ONE DROP IN EACH EYE TWICE DAILY 10 mL 97     benzocaine (ANBESOL) 10 % gel Take by mouth 3 times daily And once daily prn 9 g 3     bisacodyl (DULCOLAX) 10 MG suppository UNWRAP AND INSERT ONE SUPPOSITORY RECTALLY EVERY THREE DAYS AND DAILY AS NEEDED FOR CONSTIPATION 10 suppository 97     chlorhexidine (PERIDEX) 0.12 % solution Swish and spit 15 mLs in mouth 2 times daily        ELIQUIS ANTICOAGULANT 2.5 MG tablet TAKE 1 TABLET BY MOUTH TWICE DAILY 56 tablet 97     escitalopram (LEXAPRO) 10 MG tablet TAKE 1 TABLET BY MOUTH EVERY MORNING 90 tablet 3     latanoprost (XALATAN) 0.005 % ophthalmic solution INSTILL ONE DROP IN EACH EYE AT BEDTIME 2.5 mL 97     levothyroxine (SYNTHROID/LEVOTHROID) 88 MCG tablet TAKE 1 TABLET BY MOUTH ONCE DAILY 90 tablet 3     lisinopril (ZESTRIL) 2.5 MG tablet Take 1 tablet (2.5 mg) by mouth daily 90 tablet 3     magnesium hydroxide (MILK OF MAGNESIA) 400 MG/5ML suspension Take 30 mLs by mouth three times a week 473 mL 11     MELATONIN MAXIMUM STRENGTH 5 MG tablet TAKE 1 TABLET BY MOUTH AT BEDTIME 28 tablet 97     metoprolol tartrate (LOPRESSOR) 25 MG tablet Take 0.5 tablets (12.5 mg) by mouth 2 times daily 30 tablet 11     mirtazapine (REMERON) 15 MG tablet TAKE 1 TABLET BY MOUTH AT BEDTIME  90 tablet 3     Multiple Vitamins-Minerals (PRESERVISION AREDS) CAPS TAKE 1 CAPSULE BY MOUTH TWICE DAILY 180 capsule 97     polyethylene glycol (MIRALAX) 17 GM/Dose powder MIX 17GM OF POWDER IN 8OZ OF WATER UNTIL COMPLETELY DISSOLVED. DRINK SOLUTION DAILY;& GIVE 2ND DOSE TWICE DAILY ON MONDAY, WEDNESDAY AND FRIDAY 510 g 97     QUEtiapine (SEROQUEL) 25 MG tablet Take 1.5 tablets (37.5 mg) by mouth 3 times daily. May also take 1 tablet (25 mg) 2 times daily as needed (aggression/dementia related psychosis). 84 tablet PRN     senna-docusate (SENEXON-S) 8.6-50 MG tablet TAKE TWO TABLETS BY MOUTH TWICE DAILY. DO NOT HOLD FOR LOOSE STOOL AND UPDATE PROVIDER FOR POSSIBLEBOWEL IMPACTION. 112 tablet 97     Sodium Phosphates (ENEMA) 7-19 GM/118ML ENEM USE AS DIRECTED DAILY AS NEEDED FOR STOOL IMPACTION 399 mL PRN     traZODone (DESYREL) 50 MG tablet TAKE 1 TABLET BY MOUTH AT BEDTIME 90 tablet 3     VITAMIN D3 25 MCG (1000 UT) tablet TAKE ONE TABLET (25MCG) BY MOUTH DAILY 90 tablet 97     REVIEW OF SYSTEMS:  Unobtainable secondary to cognitive impairment.     Objective:  Temp 97.8  F  "(36.6  C)   Ht 1.727 m (5' 8\")   Wt 56.4 kg (124 lb 6.4 oz)   SpO2 94%   BMI 18.91 kg/m    Exam:  GENERAL APPEARANCE:  Alert, anxious at baseline, tearful today, worried look on face  ENT:  Mouth and posterior oropharynx normal, dry mucous membranes, normal hearing acuity  EYES: lids, pupils and irises normal  RESP:  respiratory effort and palpation of chest normal, lungs clear to auscultation   CV:  Palpation and auscultation of heart done , regular rate and rhythm, no murmur, rub, or gallop, no edema  ABDOMEN: BS, abdominal area is rounded and distended above baseline, firm. Hypoactive BS  M/S:   Gait and station at baseline-ambulates w/o assistive device-mostly steady    SKIN:  Inspection of skin and subcutaneous tissue baseline to visualized areas  NEURO:   Cranial nerves 2-12 are normal tested and grossly at patient's baseline  PSYCH:  insight and judgement impaired, memory impaired     Labs:   CBC RESULTS: Recent Labs   Lab Test 01/27/22  0905 10/21/21  0708   WBC 5.3 4.5   RBC 3.84 3.47*   HGB 12.6 11.3*   HCT 42.3 36.6   * 106*   MCH 32.8 32.6   MCHC 29.8* 30.9*   RDW 13.7 13.5    149*       Last Basic Metabolic Panel:  Recent Labs   Lab Test 01/27/22  0905 10/21/21  0708    145*   POTASSIUM 3.9 4.2   CHLORIDE 111* 115*   AMERICO 9.1 8.2*   CO2 25 24   BUN 17 18   CR 0.86 0.94   GLC 56* 80       Liver Function Studies -   Recent Labs   Lab Test 10/21/21  0708 01/06/20  1530   PROTTOTAL 6.1* 7.7   ALBUMIN 3.2* 4.4   BILITOTAL 0.4 0.9   ALKPHOS 63 66   AST 15 31   ALT 12 29       TSH   Date Value Ref Range Status   10/21/2021 3.99 0.40 - 4.00 mU/L Final   12/23/2020 4.80 (H) 0.40 - 4.00 mU/L Final   11/25/2020 8.30 (H) 0.40 - 4.00 mU/L Final       No results found for: A1C    ASSESSMENT/PLAN:  (K56.49) Impaction of the bowels (H)  (primary encounter diagnosis)  (K59.01) Slow transit constipation  Comment: acute on chronic  Plan:  -staff to obtain VS as unable during visit   -no bladder " scanner in memory care-could obtain straight cath for assessment if urinating only small amounts  -Enema daily until resolved  -supp daily until resolved   -milk of mag with prune juice daily until resolved   -consider lactulose 15mL daily  -continue Senna S 2 tabs po BID   -continue Miralax   -abdominal flat plate and compare to previous 1/24/22      Electronically signed by:  ZAFAR Bae CNP

## 2022-06-15 NOTE — LETTER
6/15/2022        RE: Annabel Pan [address] [place]  [address] [place] [address]        [facility]  [place] Medical Record Number:  [id number]  Place of Service where encounter took place:  [facility] [[id number]]  Chief Complaint   Patient presents with     RECHECK       HPI:    Annabel May  is a 88 year old (5/12/1934), who is being seen today for an episodic care visit.  HPI information obtained from: facility staff. Today's concern is:    Resident with hx of fecal impaction, constipation now distended above baseline. No emesis, but is complaining of stomach pain and that could be related to her increased agitation and aggression. Urinating but wondering if retention 2/2 constipation also occurring? Staff thinks she could have missed part of her regular bowel program over the past few days. Facility notes indicate enema was given 6/8 with some results. She received a supp on 6/13 then enema again on 6/14 resulting in medium BM but still distended above baseline so was given a enema and supp today along with MOM and prune juice. Toilets herself and no record of urination.    Past Medical and Surgical History reviewed in Epic today.    MEDICATIONS:    Current Outpatient Medications   Medication Sig Dispense Refill     acetaminophen (ACETAMINOPHEN EXTRA STRENGTH) 500 MG tablet TAKE TWO TABLETS (1000MG) BY MOUTH TWICE DAILY;& TAKE (1000MG)  BY MOUTH DAILY AS NEEDED FOR PAIN 186 tablet 97     AZOPT 1 % ophthalmic suspension INSTILL ONE DROP IN EACH EYE TWICE DAILY 10 mL 97     benzocaine (ANBESOL) 10 % gel Take by mouth 3 times daily And once daily prn 9 g 3     bisacodyl (DULCOLAX) 10 MG suppository UNWRAP AND INSERT ONE SUPPOSITORY RECTALLY EVERY THREE DAYS AND DAILY AS NEEDED FOR CONSTIPATION 10 suppository 97     chlorhexidine (PERIDEX) 0.12 % solution Swish and spit 15 mLs in mouth 2 times daily       ELIQUIS ANTICOAGULANT 2.5 MG tablet TAKE 1  "TABLET BY MOUTH TWICE DAILY 56 tablet 97     escitalopram (LEXAPRO) 10 MG tablet TAKE 1 TABLET BY MOUTH EVERY MORNING 90 tablet 3     latanoprost (XALATAN) 0.005 % ophthalmic solution INSTILL ONE DROP IN EACH EYE AT BEDTIME 2.5 mL 97     levothyroxine (SYNTHROID/LEVOTHROID) 88 MCG tablet TAKE 1 TABLET BY MOUTH ONCE DAILY 90 tablet 3     lisinopril (ZESTRIL) 2.5 MG tablet Take 1 tablet (2.5 mg) by mouth daily 90 tablet 3     magnesium hydroxide (MILK OF MAGNESIA) 400 MG/5ML suspension Take 30 mLs by mouth three times a week 473 mL 11     MELATONIN MAXIMUM STRENGTH 5 MG tablet TAKE 1 TABLET BY MOUTH AT BEDTIME 28 tablet 97     metoprolol tartrate (LOPRESSOR) 25 MG tablet Take 0.5 tablets (12.5 mg) by mouth 2 times daily 30 tablet 11     mirtazapine (REMERON) 15 MG tablet TAKE 1 TABLET BY MOUTH AT BEDTIME  90 tablet 3     Multiple Vitamins-Minerals (PRESERVISION AREDS) CAPS TAKE 1 CAPSULE BY MOUTH TWICE DAILY 180 capsule 97     polyethylene glycol (MIRALAX) 17 GM/Dose powder MIX 17GM OF POWDER IN 8OZ OF WATER UNTIL COMPLETELY DISSOLVED. DRINK SOLUTION DAILY;& GIVE 2ND DOSE TWICE DAILY ON MONDAY, WEDNESDAY AND FRIDAY 510 g 97     QUEtiapine (SEROQUEL) 25 MG tablet Take 1.5 tablets (37.5 mg) by mouth 3 times daily. May also take 1 tablet (25 mg) 2 times daily as needed (aggression/dementia related psychosis). 84 tablet PRN     senna-docusate (SENEXON-S) 8.6-50 MG tablet TAKE TWO TABLETS BY MOUTH TWICE DAILY. DO NOT HOLD FOR LOOSE STOOL AND UPDATE PROVIDER FOR POSSIBLEBOWEL IMPACTION. 112 tablet 97     Sodium Phosphates (ENEMA) 7-19 GM/118ML ENEM USE AS DIRECTED DAILY AS NEEDED FOR STOOL IMPACTION 399 mL PRN     traZODone (DESYREL) 50 MG tablet TAKE 1 TABLET BY MOUTH AT BEDTIME 90 tablet 3     VITAMIN D3 25 MCG (1000 UT) tablet TAKE ONE TABLET (25MCG) BY MOUTH DAILY 90 tablet 97     REVIEW OF SYSTEMS:  Unobtainable secondary to cognitive impairment.     Objective:  Temp 97.8  F (36.6  C)   Ht 1.727 m (5' 8\")   Wt 56.4 " kg (124 lb 6.4 oz)   SpO2 94%   BMI 18.91 kg/m    Exam:  GENERAL APPEARANCE:  Alert, anxious at baseline, tearful today, worried look on face  ENT:  Mouth and posterior oropharynx normal, dry mucous membranes, normal hearing acuity  EYES: lids, pupils and irises normal  RESP:  respiratory effort and palpation of chest normal, lungs clear to auscultation   CV:  Palpation and auscultation of heart done , regular rate and rhythm, no murmur, rub, or gallop, no edema  ABDOMEN: BS, abdominal area is rounded and distended above baseline, firm. Hypoactive BS  M/S:   Gait and station at baseline-ambulates w/o assistive device-mostly steady    SKIN:  Inspection of skin and subcutaneous tissue baseline to visualized areas  NEURO:   Cranial nerves 2-12 are normal tested and grossly at patient's baseline  PSYCH:  insight and judgement impaired, memory impaired     Labs:   CBC RESULTS: Recent Labs   Lab Test 01/27/22  0905 10/21/21  0708   WBC 5.3 4.5   RBC 3.84 3.47*   HGB 12.6 11.3*   HCT 42.3 36.6   * 106*   MCH 32.8 32.6   MCHC 29.8* 30.9*   RDW 13.7 13.5    149*       Last Basic Metabolic Panel:  Recent Labs   Lab Test 01/27/22  0905 10/21/21  0708    145*   POTASSIUM 3.9 4.2   CHLORIDE 111* 115*   AMERICO 9.1 8.2*   CO2 25 24   BUN 17 18   CR 0.86 0.94   GLC 56* 80       Liver Function Studies -   Recent Labs   Lab Test 10/21/21  0708 01/06/20  1530   PROTTOTAL 6.1* 7.7   ALBUMIN 3.2* 4.4   BILITOTAL 0.4 0.9   ALKPHOS 63 66   AST 15 31   ALT 12 29       TSH   Date Value Ref Range Status   10/21/2021 3.99 0.40 - 4.00 mU/L Final   12/23/2020 4.80 (H) 0.40 - 4.00 mU/L Final   11/25/2020 8.30 (H) 0.40 - 4.00 mU/L Final       No results found for: A1C    ASSESSMENT/PLAN:  (K56.49) Impaction of the bowels (H)  (primary encounter diagnosis)  (K59.01) Slow transit constipation  Comment: acute on chronic  Plan:  -staff to obtain VS as unable during visit   -no bladder scanner in memory care-could obtain straight  cath for assessment if urinating only small amounts  -Enema daily until resolved  -supp daily until resolved   -milk of mag with prune juice daily until resolved   -consider lactulose 15mL daily  -continue Senna S 2 tabs po BID   -continue Miralax   -abdominal flat plate and compare to previous 1/24/22      Electronically signed by:  ZAFAR Bae CNP                 Sincerely,        ZAFAR Bae CNP

## 2022-06-15 NOTE — LETTER
Order for Annabel May    1. Abdominal x-ray and compare to previous x-ray of 1/24/22  2. Monitor for s/s of urinary retention and update provider (low output, difficulty, increased BP)  3. VS and updated to ZAFAR Johnson CNP on 6/15/2022 at 9:37 PM

## 2022-06-17 NOTE — ED NOTES
Bed: ED15  Expected date:   Expected time:   Means of arrival:   Comments:  Imelda CaceresF bowel obstruction eta 0948

## 2022-06-17 NOTE — ED NOTES
Pt has episode of shouting out and trying to climb out of bed as well as to remove her IV.   Md was notified - pt given Morphine for pain and ativan for her restlessness.    pt now sleeping - for some time EDt was sitting bedside.

## 2022-06-17 NOTE — ED NOTES
[facility]  ED Nurse Handoff Report    ED Chief complaint: Abdominal Pain      ED Diagnosis:   Final diagnoses:   None       Code Status: TBDWH ( to be discussed with hospitalist )     Allergies:   Allergies   Allergen Reactions     Midazolam Unknown     Vecuronium Unknown     Son reports reaction was severe - specific reaction unknown       Patient Story: pt from nursing home has dementia - had issues wanting to have her mother present - pt reportedly from the nursing home has not had a bowel movement for a week and they had tried enema and meds.   Focused Assessment:  Alert but very demented - pt may require a sitter or medication to help her remain calm while in the hospital.     Treatments and/or interventions provided: ivf, pain meds, meds to help patient calm down,   admission.   Patient's response to treatments and/or interventions: it appears as though pain decreased as pt became less agitated and no onger was pointing at her belly saying she had pain .     To be done/followed up on inpatient unit:  monitor and assist with dailys     Does this patient have any cognitive concerns?: Baseline dementia    Activity level - Baseline/Home:  Stand with Assist  Activity Level - Current:   Unknown    Patient's Preferred language: English   Needed?: No    Isolation: None  Infection: Not Applicable  Patient tested for COVID 19 prior to admission: YES  Bariatric?: No    Vital Signs:   Vitals:    06/17/22 1000 06/17/22 1029 06/17/22 1100 06/17/22 1200   BP: (!) 124/97      Pulse: 92      Resp:       Temp:       TempSrc:       SpO2:  99% 92% 95%       Cardiac Rhythm:     Was the PSS-3 completed:   Yes  What interventions are required if any?               Family Comments: Wife present and updated   OBS brochure/video discussed/provided to patient/family: N/A              Name of person given brochure if not patient: na              Relationship to patient: na    For the majority of the shift  this patient's behavior was Green.   Behavioral interventions performed were na.    ED NURSE PHONE NUMBER: [phone]

## 2022-06-17 NOTE — ED PROVIDER NOTES
History   Chief Complaint:  Abdominal Pain       The history is provided by the patient. History limited by: Alzheimer's disease.      Annabel May is a 88 year old female with history of small bowel obstruction who presents with abdominal pain. On 06/08 Annabel was given an enema with suspected bowel impaction at the time due to abdominal distension. She received a supplement and was given an enema again on 06/14 with moderate bowel movement but still demonstrated bowel distension. It is reported that she had an X-ray suggesting a bowel obstruction, prompting Annabel's presentation to the emergency department. Here at bedside Annabel endorses continued worsening upper abdominal pain. Denies any nausea or vomiting.     Review of Systems   Unable to perform ROS: Dementia   All other systems reviewed and are negative.    Allergies:  Midazolam  Vecuronium    Medications:  Bisacodyl   Eliquis  escitalopram   Levothyroxine   Lisinopril   Magnesium hydroxide   Melatonin   Lopressor   Mirtazapine   Multiple Vitamins-Minerals   Miralax  Quetiapine   senna-docusate   Sodium Phosphates Enema  Trazodone    Vitamin D3 25 MCG      Past Medical History:     Arrhythmia   Arthritis   Breast cancer (H)   Hyperlipidaemia   Hypertension   Hypothyroid   Migraines   PONV (postoperative nausea and vomiting)  Syncope  B12 deficiency  Slow transit constipation  Dementia, Alzheimer's  CKD  Protein-calorie malnutrition  Cardiac  Pacemaker  Glaucoma    Past Surgical History:    Appendectomy  Prophylactic mastectomy  Partial thyroidectomy  Glaucoma surgery  Hysterectomy  Phacoemulsification clear cornea with standard intraocular lens implant, endoscopic cyclo photocoagulation combined.    Social History:  Patient unaccompanied  PCP: Reese Morfin     Physical Exam     Patient Vitals for the past 24 hrs:   BP Temp Temp src Pulse Resp SpO2   06/17/22 1200 -- -- -- -- -- 95 %   06/17/22 1100 -- -- -- -- -- 92 %   06/17/22 1029 -- -- -- -- -- 99  %   06/17/22 1000 (!) 124/97 -- -- 92 -- --   06/17/22 0930 118/76 -- -- 67 -- 99 %   06/17/22 0926 117/81 98.7  F (37.1  C) Oral 70 18 99 %       Physical Exam  General/Appearance: appears stated age, well-groomed, appears comfortable  Eyes: EOMI, no scleral injection, no icterus  ENT: MMM  Neck: supple, nl ROM, no stiffness  Cardiovascular: RRR, nl S1S2, no m/r/g, 2+ pulses in all 4 extremities, cap refill <2sec  Respiratory: CTAB, good air movement throughout, no wheezes/rhonchi/rales, no increased WOB, no retractions  GI: abd soft, diffuse upper abd pain with mild distention,  no HSM, no rebound, no guarding, nl BS  MSK: MA, good tone, no bony abnormality  Skin: warm and well-perfused, no rash, no edema, no ecchymosis, nl turgor  Neuro: GCS 15, alert, no gross focal neuro deficits  Heme: no petechia, no purpura, no active bleeding        Emergency Department Course   Imaging:  CT Abdomen Pelvis w Contrast   Preliminary Result   IMPRESSION:    1.  Mild bilateral lower lobe atelectasis and/or infiltrate.   2.  No bowel obstruction demonstrated, moderate to large amount of   stool.        Report per radiology    Laboratory:  Labs Ordered and Resulted from Time of ED Arrival to Time of ED Departure   COMPREHENSIVE METABOLIC PANEL - Abnormal       Result Value    Sodium 143      Potassium 4.1      Chloride 110 (*)     Carbon Dioxide (CO2) 27      Anion Gap 6      Urea Nitrogen 22      Creatinine 1.11 (*)     Calcium 9.0      Glucose 96      Alkaline Phosphatase 73      AST 20      ALT 16      Protein Total 6.9      Albumin 3.8      Bilirubin Total 0.5      GFR Estimate 48 (*)    LIPASE - Normal    Lipase 177     CBC WITH PLATELETS AND DIFFERENTIAL    WBC Count 6.3      RBC Count 3.89      Hemoglobin 12.5      Hematocrit 39.0            MCH 32.1      MCHC 32.1      RDW 14.0      Platelet Count 174      % Neutrophils 65      % Lymphocytes 26      % Monocytes 7      % Eosinophils 2      % Basophils 0      %  Immature Granulocytes 0      NRBCs per 100 WBC 0      Absolute Neutrophils 4.1      Absolute Lymphocytes 1.6      Absolute Monocytes 0.4      Absolute Eosinophils 0.1      Absolute Basophils 0.0      Absolute Immature Granulocytes 0.0      Absolute NRBCs 0.0          Emergency Department Course:             Reviewed:  I reviewed nursing notes, vitals, past medical history and Care Everywhere    Assessments/Consults:  ED Course as of 06/17/22 1317   Fri Jun 17, 2022   0925 Obtained history and examined the patient as noted above.        Interventions:  1010 NS 1000 mL, IV  1017 Morphine 2 mg, IV  1030 Morphine 2 mg, IV  1035 Ativan 0.5 mg, IV    Disposition:  The patient was discharged to home.     Impression & Plan   Medical Decision Making:  This patient is an 88-year-old female whose history is very limited secondary to her dementia.  Is really unable to help provide any additional information.  We were made aware that there is been suspicion and concern that she had a bowel obstruction.  It was reported that an x-ray as an outpatient suggested obstruction, prompting her ER visit.  Here she was very agitated and did require some Ativan to try to calm her down so we can complete the CT scan.  The CT scan does show significant stool but no fecal impaction, no bowel obstruction.  Other labs are unremarkable.  CT suggests atelectasis versus infiltrate.  She has no white count, fever, hypoxia, tachycardia, tachypnea so I doubt infection.  I think it is reasonable for her to be discharged home with bowel regimen.  Diagnosis:    ICD-10-CM    1. Constipation, unspecified constipation type  K59.00      Scribe Disclosure:  I, Teo Glass, am serving as a scribe at 9:24 AM on 6/17/2022 to document services personally performed by Aparna Ruiz MD based on my observations and the provider's statements to me.           Aparna Ruiz MD  06/17/22 3340

## 2022-06-17 NOTE — ED TRIAGE NOTES
Arrives via EMS from [facility]. Hx of bowel obstructions, no BM  x1 week, enema not successful. Xray suggested obstruction or ileus.     Triage Assessment     Row Name 06/17/22 0923       Triage Assessment (Adult)    Airway WDL WDL       Respiratory WDL    Respiratory WDL WDL       Skin Circulation/Temperature WDL    Skin Circulation/Temperature WDL WDL       Cardiac WDL    Cardiac WDL WDL       Peripheral/Neurovascular WDL    Peripheral Neurovascular WDL WDL       Cognitive/Neuro/Behavioral WDL    Cognitive/Neuro/Behavioral WDL X  HX of alzheimers at baseline    Level of Consciousness confused    Arousal Level opens eyes spontaneously    Orientation disoriented to;place;time;situation

## 2022-06-22 NOTE — LETTER
Annabel May orders    1. Increase metoprolol to    metoprolol tartrate (LOPRESSOR) 25 MG tablet Take 1 tablet (25 mg) by mouth 2 times daily     2. BP/HR 3x weekly at various times for 2 weeks for hypertension     ZAFAR Bae CNP on 6/22/2022 at 6:56 PM

## 2022-06-22 NOTE — LETTER
New orders for Annabel May    1. Begin    lactulose 20 GM/30ML SOLN Take 15 mLs (10 g) by mouth three times a week     2. Encourage order intake of fluids to prevent constipation     ZAFAR Bae CNP on 6/22/2022 at 3:33 PM

## 2022-06-22 NOTE — LETTER
6/22/2022        RE: Annabel Pan On Annamaria  [address] [address]        [facility]  [facility] Medical Record Number:  [id number]  Place of Service where encounter took place:  [facility] [[id number]]  Chief Complaint   Patient presents with     RECHECK     constipation       HPI:    Annabel May  is a 88 year old (5/12/1934), who is being seen today for an episodic care visit.  HPI information obtained from: facility staff and [facility] chart review. Today's concern is:    Seen today for follow up to ED visit for possible ileus noted on portable x-ray. CT abdomen pelvis w/contrast showed mild bilateral lower lobe atelectasis and or infiltrate (infection ruled out) and no bowel obstruction, moderate to large amount of stool. Abdomen is somewhat reduced from previous visit today. Hx of bowel impaction, obstruction, constipation. Her son, a radiologist, also mentioned she has at baseline extra amount of colon. Last bowel movement in chart review on 6/18/22 and medium size. She continues to report pain in abdomen. No vomiting, nausea. BP elevated today 2/2 pain, agitation?       Past Medical and Surgical History reviewed in Epic today.    MEDICATIONS:    Current Outpatient Medications   Medication Sig Dispense Refill     lactulose 20 GM/30ML SOLN Take 15 mLs (10 g) by mouth three times a week 473 mL 11     acetaminophen (ACETAMINOPHEN EXTRA STRENGTH) 500 MG tablet TAKE TWO TABLETS (1000MG) BY MOUTH TWICE DAILY;& TAKE (1000MG)  BY MOUTH DAILY AS NEEDED FOR PAIN 186 tablet 97     AZOPT 1 % ophthalmic suspension INSTILL ONE DROP IN EACH EYE TWICE DAILY 10 mL 97     benzocaine (ANBESOL) 10 % gel Take by mouth 3 times daily And once daily prn 9 g 3     bisacodyl (DULCOLAX) 10 MG suppository UNWRAP AND INSERT ONE SUPPOSITORY RECTALLY EVERY THREE DAYS AND DAILY AS NEEDED FOR CONSTIPATION 10 suppository 97     chlorhexidine (PERIDEX) 0.12 %  solution Swish and spit 15 mLs in mouth 2 times daily       ELIQUIS ANTICOAGULANT 2.5 MG tablet TAKE 1 TABLET BY MOUTH TWICE DAILY 56 tablet 97     escitalopram (LEXAPRO) 10 MG tablet TAKE 1 TABLET BY MOUTH EVERY MORNING 90 tablet 3     latanoprost (XALATAN) 0.005 % ophthalmic solution INSTILL ONE DROP IN EACH EYE AT BEDTIME 2.5 mL 97     levothyroxine (SYNTHROID/LEVOTHROID) 88 MCG tablet TAKE 1 TABLET BY MOUTH ONCE DAILY 90 tablet 3     lisinopril (ZESTRIL) 2.5 MG tablet Take 1 tablet (2.5 mg) by mouth daily 90 tablet 3     magnesium hydroxide (MILK OF MAGNESIA) 400 MG/5ML suspension Take 30 mLs by mouth three times a week 473 mL 11     MELATONIN MAXIMUM STRENGTH 5 MG tablet TAKE 1 TABLET BY MOUTH AT BEDTIME 28 tablet 97     metoprolol tartrate (LOPRESSOR) 25 MG tablet Take 0.5 tablets (12.5 mg) by mouth 2 times daily 30 tablet 11     mirtazapine (REMERON) 15 MG tablet TAKE 1 TABLET BY MOUTH AT BEDTIME  90 tablet 3     Multiple Vitamins-Minerals (PRESERVISION AREDS) CAPS TAKE 1 CAPSULE BY MOUTH TWICE DAILY 180 capsule 97     polyethylene glycol (MIRALAX) 17 GM/Dose powder MIX 17GM OF POWDER IN 8OZ OF WATER UNTIL COMPLETELY DISSOLVED. DRINK SOLUTION DAILY;& GIVE 2ND DOSE TWICE DAILY ON MONDAY, WEDNESDAY AND FRIDAY 510 g 97     QUEtiapine (SEROQUEL) 25 MG tablet Take 1.5 tablets (37.5 mg) by mouth 3 times daily. May also take 1 tablet (25 mg) 2 times daily as needed (aggression/dementia related psychosis). 84 tablet PRN     senna-docusate (SENEXON-S) 8.6-50 MG tablet TAKE TWO TABLETS BY MOUTH TWICE DAILY. DO NOT HOLD FOR LOOSE STOOL AND UPDATE PROVIDER FOR POSSIBLEBOWEL IMPACTION. 112 tablet 97     Sodium Phosphates (ENEMA) 7-19 GM/118ML ENEM USE AS DIRECTED DAILY AS NEEDED FOR STOOL IMPACTION 399 mL PRN     traZODone (DESYREL) 50 MG tablet TAKE 1 TABLET BY MOUTH AT BEDTIME 90 tablet 3     VITAMIN D3 25 MCG (1000 UT) tablet TAKE ONE TABLET (25MCG) BY MOUTH DAILY 90 tablet 97     REVIEW OF SYSTEMS:  Unobtainable  "secondary to cognitive impairment.     Objective:  BP (!) 150/87   Pulse 82   Temp 97.8  F (36.6  C)   Resp 16   Ht 1.727 m (5' 8\")   Wt 56.4 kg (124 lb 6.4 oz)   SpO2 94%   BMI 18.91 kg/m    Exam:  GENERAL APPEARANCE:  Alert, anxious at baseline, tearful today, worried look on face  ENT:  Mouth and posterior oropharynx normal, dry mucous membranes, normal hearing acuity  EYES: lids, pupils and irises normal  RESP:  respiratory effort and palpation of chest normal, lungs clear to auscultation but diminished   CV:  Palpation and auscultation of heart done , regular rate and rhythm, no murmur, rub, or gallop, no edema  ABDOMEN: BS, abdominal area is rounded and distended near baseline, firm. Hypoactive BS  M/S:   Gait and station at baseline-ambulates w/o assistive device-mostly steady    SKIN:  Inspection of skin and subcutaneous tissue baseline to visualized areas  NEURO:   Cranial nerves 2-12 are normal tested and grossly at patient's baseline  PSYCH:  insight and judgement impaired, memory impaired        Labs:   CBC RESULTS: Recent Labs   Lab Test 06/17/22  0934 01/27/22  0905   WBC 6.3 5.3   RBC 3.89 3.84   HGB 12.5 12.6   HCT 39.0 42.3    110*   MCH 32.1 32.8   MCHC 32.1 29.8*   RDW 14.0 13.7    184       Last Basic Metabolic Panel:  Recent Labs   Lab Test 06/17/22  0934 01/27/22  0905    141   POTASSIUM 4.1 3.9   CHLORIDE 110* 111*   AMERICO 9.0 9.1   CO2 27 25   BUN 22 17   CR 1.11* 0.86   GLC 96 56*       Liver Function Studies -   Recent Labs   Lab Test 06/17/22  0934 10/21/21  0708   PROTTOTAL 6.9 6.1*   ALBUMIN 3.8 3.2*   BILITOTAL 0.5 0.4   ALKPHOS 73 63   AST 20 15   ALT 16 12       TSH   Date Value Ref Range Status   10/21/2021 3.99 0.40 - 4.00 mU/L Final   12/23/2020 4.80 (H) 0.40 - 4.00 mU/L Final   11/25/2020 8.30 (H) 0.40 - 4.00 mU/L Final       No results found for: A1C    ASSESSMENT/PLAN:  (K56.49) Impaction of the bowels (H)  (primary encounter diagnosis)  (K59.01) Slow " transit constipation  Comment: acute and chronic   Plan:  -Enema daily prn  -supp daily prn and q3 days if no BM  -milk of mag with prune juice daily   -starting lactulose 15mL 3 times weekly  -continue Senna S 2 tabs po BID   -continue Miralax daily and twice daily Mon/Wed/Fri    (I10) Benign essential hypertension  BP Readings from Last 6 Encounters:   06/22/22 (!) 150/87   06/17/22 (!) 124/97   04/11/22 135/83   01/18/22 125/80   12/01/21 (!) 153/67   10/20/21 (!) 140/95     Pulse Readings from Last 4 Encounters:   06/22/22 82   06/17/22 92   04/11/22 82   01/18/22 81     Comment: elevated HR and BP at times-weight stable   Plan:   -lisinopril 2.5 mg po daily  -increase metoprolol from 12.5 mg po BID to 25 mg po BID  -BMP periodically       Electronically signed by:  ZAFAR Bae CNP               Sincerely,        ZAFAR Bae CNP

## 2022-06-22 NOTE — PROGRESS NOTES
[facility]  [place] [facility] Record Number:  [id number]  Place of Service where encounter took place:  [facility] [[id number]]  Chief Complaint   Patient presents with     RECHECK     constipation       HPI:    Annabel May  is a 88 year old (5/12/1934), who is being seen today for an episodic care visit.  HPI information obtained from: facility staff and [facility] chart review. Today's concern is:    Seen today for follow up to ED visit for possible ileus noted on portable x-ray. CT abdomen pelvis w/contrast showed mild bilateral lower lobe atelectasis and or infiltrate (infection ruled out) and no bowel obstruction, moderate to large amount of stool. Abdomen is somewhat reduced from previous visit today. Hx of bowel impaction, obstruction, constipation. Her son, a radiologist, also mentioned she has at baseline extra amount of colon. Last bowel movement in chart review on 6/18/22 and medium size. She continues to report pain in abdomen. No vomiting, nausea. BP elevated today 2/2 pain, agitation?       Past Medical and Surgical History reviewed in Epic today.    MEDICATIONS:    Current Outpatient Medications   Medication Sig Dispense Refill     lactulose 20 GM/30ML SOLN Take 15 mLs (10 g) by mouth three times a week 473 mL 11     acetaminophen (ACETAMINOPHEN EXTRA STRENGTH) 500 MG tablet TAKE TWO TABLETS (1000MG) BY MOUTH TWICE DAILY;& TAKE (1000MG)  BY MOUTH DAILY AS NEEDED FOR PAIN 186 tablet 97     AZOPT 1 % ophthalmic suspension INSTILL ONE DROP IN EACH EYE TWICE DAILY 10 mL 97     benzocaine (ANBESOL) 10 % gel Take by mouth 3 times daily And once daily prn 9 g 3     bisacodyl (DULCOLAX) 10 MG suppository UNWRAP AND INSERT ONE SUPPOSITORY RECTALLY EVERY THREE DAYS AND DAILY AS NEEDED FOR CONSTIPATION 10 suppository 97     chlorhexidine (PERIDEX) 0.12 % solution Swish and spit 15 mLs in mouth 2 times daily       ELIQUIS ANTICOAGULANT 2.5 MG tablet TAKE 1  TABLET BY MOUTH TWICE DAILY 56 tablet 97     escitalopram (LEXAPRO) 10 MG tablet TAKE 1 TABLET BY MOUTH EVERY MORNING 90 tablet 3     latanoprost (XALATAN) 0.005 % ophthalmic solution INSTILL ONE DROP IN EACH EYE AT BEDTIME 2.5 mL 97     levothyroxine (SYNTHROID/LEVOTHROID) 88 MCG tablet TAKE 1 TABLET BY MOUTH ONCE DAILY 90 tablet 3     lisinopril (ZESTRIL) 2.5 MG tablet Take 1 tablet (2.5 mg) by mouth daily 90 tablet 3     magnesium hydroxide (MILK OF MAGNESIA) 400 MG/5ML suspension Take 30 mLs by mouth three times a week 473 mL 11     MELATONIN MAXIMUM STRENGTH 5 MG tablet TAKE 1 TABLET BY MOUTH AT BEDTIME 28 tablet 97     metoprolol tartrate (LOPRESSOR) 25 MG tablet Take 0.5 tablets (12.5 mg) by mouth 2 times daily 30 tablet 11     mirtazapine (REMERON) 15 MG tablet TAKE 1 TABLET BY MOUTH AT BEDTIME  90 tablet 3     Multiple Vitamins-Minerals (PRESERVISION AREDS) CAPS TAKE 1 CAPSULE BY MOUTH TWICE DAILY 180 capsule 97     polyethylene glycol (MIRALAX) 17 GM/Dose powder MIX 17GM OF POWDER IN 8OZ OF WATER UNTIL COMPLETELY DISSOLVED. DRINK SOLUTION DAILY;& GIVE 2ND DOSE TWICE DAILY ON MONDAY, WEDNESDAY AND FRIDAY 510 g 97     QUEtiapine (SEROQUEL) 25 MG tablet Take 1.5 tablets (37.5 mg) by mouth 3 times daily. May also take 1 tablet (25 mg) 2 times daily as needed (aggression/dementia related psychosis). 84 tablet PRN     senna-docusate (SENEXON-S) 8.6-50 MG tablet TAKE TWO TABLETS BY MOUTH TWICE DAILY. DO NOT HOLD FOR LOOSE STOOL AND UPDATE PROVIDER FOR POSSIBLEBOWEL IMPACTION. 112 tablet 97     Sodium Phosphates (ENEMA) 7-19 GM/118ML ENEM USE AS DIRECTED DAILY AS NEEDED FOR STOOL IMPACTION 399 mL PRN     traZODone (DESYREL) 50 MG tablet TAKE 1 TABLET BY MOUTH AT BEDTIME 90 tablet 3     VITAMIN D3 25 MCG (1000 UT) tablet TAKE ONE TABLET (25MCG) BY MOUTH DAILY 90 tablet 97     REVIEW OF SYSTEMS:  Unobtainable secondary to cognitive impairment.     Objective:  BP (!) 150/87   Pulse 82   Temp 97.8  F (36.6  C)    "Resp 16   Ht 1.727 m (5' 8\")   Wt 56.4 kg (124 lb 6.4 oz)   SpO2 94%   BMI 18.91 kg/m    Exam:  GENERAL APPEARANCE:  Alert, anxious at baseline, tearful today, worried look on face  ENT:  Mouth and posterior oropharynx normal, dry mucous membranes, normal hearing acuity  EYES: lids, pupils and irises normal  RESP:  respiratory effort and palpation of chest normal, lungs clear to auscultation but diminished   CV:  Palpation and auscultation of heart done , regular rate and rhythm, no murmur, rub, or gallop, no edema  ABDOMEN: BS, abdominal area is rounded and distended near baseline, firm. Hypoactive BS  M/S:   Gait and station at baseline-ambulates w/o assistive device-mostly steady    SKIN:  Inspection of skin and subcutaneous tissue baseline to visualized areas  NEURO:   Cranial nerves 2-12 are normal tested and grossly at patient's baseline  PSYCH:  insight and judgement impaired, memory impaired        Labs:   CBC RESULTS: Recent Labs   Lab Test 06/17/22  0934 01/27/22  0905   WBC 6.3 5.3   RBC 3.89 3.84   HGB 12.5 12.6   HCT 39.0 42.3    110*   MCH 32.1 32.8   MCHC 32.1 29.8*   RDW 14.0 13.7    184       Last Basic Metabolic Panel:  Recent Labs   Lab Test 06/17/22  0934 01/27/22  0905    141   POTASSIUM 4.1 3.9   CHLORIDE 110* 111*   AMERICO 9.0 9.1   CO2 27 25   BUN 22 17   CR 1.11* 0.86   GLC 96 56*       Liver Function Studies -   Recent Labs   Lab Test 06/17/22  0934 10/21/21  0708   PROTTOTAL 6.9 6.1*   ALBUMIN 3.8 3.2*   BILITOTAL 0.5 0.4   ALKPHOS 73 63   AST 20 15   ALT 16 12       TSH   Date Value Ref Range Status   10/21/2021 3.99 0.40 - 4.00 mU/L Final   12/23/2020 4.80 (H) 0.40 - 4.00 mU/L Final   11/25/2020 8.30 (H) 0.40 - 4.00 mU/L Final       No results found for: A1C    ASSESSMENT/PLAN:  (K56.49) Impaction of the bowels (H)  (primary encounter diagnosis)  (K59.01) Slow transit constipation  Comment: acute and chronic   Plan:  -Enema daily prn  -supp daily prn and q3 days if " no BM  -milk of mag with prune juice daily   -starting lactulose 15mL 3 times weekly  -continue Senna S 2 tabs po BID   -continue Miralax daily and twice daily Mon/Wed/Fri    (I10) Benign essential hypertension  BP Readings from Last 6 Encounters:   06/22/22 (!) 150/87   06/17/22 (!) 124/97   04/11/22 135/83   01/18/22 125/80   12/01/21 (!) 153/67   10/20/21 (!) 140/95     Pulse Readings from Last 4 Encounters:   06/22/22 82   06/17/22 92   04/11/22 82   01/18/22 81     Comment: elevated HR and BP at times-weight stable   Plan:   -lisinopril 2.5 mg po daily  -increase metoprolol from 12.5 mg po BID to 25 mg po BID  -BMP periodically       Electronically signed by:  ZAFAR Bae CNP

## 2022-07-25 NOTE — TELEPHONE ENCOUNTER
[facility] NON-EMERGENT VOICEMAIL ENCOUNTER    Annabel May is a 88 year old  (5/12/1934), Voicemail Message received today regarding:   Witnessed fall    Disposition    Fax received from facility in regards to patient fall.          Requests    FYI      Electronically signed by:   Maliha Vidales RN

## 2022-07-27 NOTE — LETTER
7/27/2022        RE: Annabel Pan On [place]aria  [address] [address]        [facility]  [place] Medical Record Number:  [id number]  Place of Service where encounter took place:  [facility] [[id number]]  Chief Complaint   Patient presents with     RECHECK       HPI:    Annabel May  is a 88 year old (5/12/1934), who is being seen today for an episodic care visit.  HPI information obtained from: facility chart records, facility staff, patient report and [facility] chart review. Today's concern is:    Seen today for fall follow up and ongoing constipation. Appears unsteady on her feet but ok with ROM to upper and lower extremities. VSS. Abdomen continues to appear very distended above baseline. Staff reporting an extra large bowel movement yesterday. Last imaging was negative for bowel obstruction.  History of extra colon, obstruction, constipation. No nausea or vomiting noted in facility chart review.     Past Medical and Surgical History reviewed in Epic today.    MEDICATIONS:    Current Outpatient Medications   Medication Sig Dispense Refill     acetaminophen (ACETAMINOPHEN EXTRA STRENGTH) 500 MG tablet TAKE TWO TABLETS (1000MG) BY MOUTH TWICE DAILY;& TAKE (1000MG)  BY MOUTH DAILY AS NEEDED FOR PAIN 186 tablet 97     AZOPT 1 % ophthalmic suspension INSTILL ONE DROP IN EACH EYE TWICE DAILY 10 mL 97     benzocaine (ANBESOL) 10 % gel Take by mouth 3 times daily And once daily prn 9 g 3     bisacodyl (DULCOLAX) 10 MG suppository UNWRAP AND INSERT ONE SUPPOSITORY RECTALLY EVERY THREE DAYS AND DAILY AS NEEDED FOR CONSTIPATION 10 suppository 97     chlorhexidine (PERIDEX) 0.12 % solution Swish and spit 15 mLs in mouth 2 times daily       ELIQUIS ANTICOAGULANT 2.5 MG tablet TAKE 1 TABLET BY MOUTH TWICE DAILY 56 tablet 97     escitalopram (LEXAPRO) 10 MG tablet TAKE 1 TABLET BY MOUTH EVERY MORNING 90 tablet 3     lactulose 20 GM/30ML SOLN Take  "15 mLs (10 g) by mouth three times a week 473 mL 11     latanoprost (XALATAN) 0.005 % ophthalmic solution INSTILL ONE DROP IN EACH EYE AT BEDTIME 2.5 mL 97     levothyroxine (SYNTHROID/LEVOTHROID) 88 MCG tablet TAKE 1 TABLET BY MOUTH ONCE DAILY 90 tablet 3     lisinopril (ZESTRIL) 2.5 MG tablet Take 1 tablet (2.5 mg) by mouth daily 90 tablet 3     MELATONIN MAXIMUM STRENGTH 5 MG tablet TAKE 1 TABLET BY MOUTH AT BEDTIME 28 tablet 97     metoprolol tartrate (LOPRESSOR) 25 MG tablet Take 1 tablet (25 mg) by mouth 2 times daily 30 tablet 11     MILK OF MAGNESIA 1200 MG/15ML suspension TAKE 30ML BY MOUTH ONCE DAILY WITH PRUNE JUICE 710 mL 97     mirtazapine (REMERON) 15 MG tablet TAKE 1 TABLET BY MOUTH AT BEDTIME  90 tablet 3     Multiple Vitamins-Minerals (PRESERVISION AREDS) CAPS TAKE 1 CAPSULE BY MOUTH TWICE DAILY 180 capsule 97     polyethylene glycol (MIRALAX) 17 GM/Dose powder MIX 17GM OF POWDER IN 8OZ OF WATER UNTIL COMPLETELY DISSOLVED. DRINK SOLUTION DAILY;& GIVE 2ND DOSE TWICE DAILY ON MONDAY, WEDNESDAY AND FRIDAY 510 g 97     QUEtiapine (SEROQUEL) 25 MG tablet Take 1.5 tablets (37.5 mg) by mouth 3 times daily. May also take 1 tablet (25 mg) 2 times daily as needed (aggression/dementia related psychosis). 84 tablet PRN     senna-docusate (SENEXON-S) 8.6-50 MG tablet TAKE TWO TABLETS BY MOUTH TWICE DAILY. DO NOT HOLD FOR LOOSE STOOL AND UPDATE PROVIDER FOR POSSIBLEBOWEL IMPACTION. 112 tablet 97     Sodium Phosphates (ENEMA) 7-19 GM/118ML ENEM USE AS DIRECTED DAILY AS NEEDED FOR STOOL IMPACTION 399 mL PRN     traZODone (DESYREL) 50 MG tablet TAKE 1 TABLET BY MOUTH AT BEDTIME 90 tablet 3     VITAMIN D3 25 MCG (1000 UT) tablet TAKE ONE TABLET (25MCG) BY MOUTH DAILY 90 tablet 97     REVIEW OF SYSTEMS:  Unobtainable secondary to cognitive impairment.     Objective:  /80   Pulse 74   Temp 97.6  F (36.4  C)   Resp 20   Ht 1.727 m (5' 8\")   Wt 56.4 kg (124 lb 6.4 oz)   SpO2 100%   BMI 18.91 kg/m  "   Exam:  GENERAL APPEARANCE:  Alert, anxious at baseline, less tearful today, worried look on face  ENT:  Mouth and posterior oropharynx normal, dry mucous membranes, normal hearing acuity  EYES: lids, pupils and irises normal  RESP:  respiratory effort and palpation of chest normal, lungs clear to auscultation but diminished   CV:  Palpation and auscultation of heart done , regular rate and rhythm, no murmur, rub, or gallop, trace edema  ABDOMEN: BS, abdominal area is rounded and very distended above baseline, firm. Hyperactive  BS  M/S:   Gait and station at baseline-ambulates w/o assistive device-mostly steady    SKIN:  Inspection of skin and subcutaneous tissue baseline to visualized areas  NEURO:   Cranial nerves 2-12 are normal tested and grossly at patient's baseline  PSYCH:  insight and judgement impaired, memory impaired     Labs:   CBC RESULTS: Recent Labs   Lab Test 06/17/22  0934 01/27/22  0905   WBC 6.3 5.3   RBC 3.89 3.84   HGB 12.5 12.6   HCT 39.0 42.3    110*   MCH 32.1 32.8   MCHC 32.1 29.8*   RDW 14.0 13.7    184       Last Basic Metabolic Panel:  Recent Labs   Lab Test 06/17/22  0934 01/27/22  0905    141   POTASSIUM 4.1 3.9   CHLORIDE 110* 111*   AMERICO 9.0 9.1   CO2 27 25   BUN 22 17   CR 1.11* 0.86   GLC 96 56*       Liver Function Studies -   Recent Labs   Lab Test 06/17/22  0934 10/21/21  0708   PROTTOTAL 6.9 6.1*   ALBUMIN 3.8 3.2*   BILITOTAL 0.5 0.4   ALKPHOS 73 63   AST 20 15   ALT 16 12       TSH   Date Value Ref Range Status   10/21/2021 3.99 0.40 - 4.00 mU/L Final   12/23/2020 4.80 (H) 0.40 - 4.00 mU/L Final   11/25/2020 8.30 (H) 0.40 - 4.00 mU/L Final       No results found for: A1C    ASSESSMENT/PLAN:  (K56.49) Impaction of the bowels (H)  (primary encounter diagnosis)  (K59.01) Slow transit constipation  Comment: acute and chronic-previous bladder scan negative for urine retention   Plan:  -Enema daily prn  -supp daily prn and q3 days if no BM  -milk of mag with  prune juice daily   -starting lactulose 15mL 3 times weekly increased to 4x weekly prn   -continue Senna S 2 tabs po BID   -continue Miralax daily and twice daily Mon/Wed/Fri    (W19.XXXS) Fall, sequela  Comment: at risk for additional falls   Plan:   -unable to use walker for ambulation 2/2 dementia  -in community room as able for additional oversight (although fall was in common area)     (E03.9) Acquired hypothyroidism  Comment: chronic   Plan:   -TSH recheck   -Levothyroxine 88 mcg po daily       Electronically signed by:  ZAFAR Bae CNP                 Sincerely,        ZAFAR Bae CNP

## 2022-07-27 NOTE — LETTER
New orders for Annabel May    1. TSH for hypothyroidism next week on lab day  2. Increase  To    lactulose 20 GM/30ML SOLN Take 15 mLs (10 g) by mouth four times a week       ZAFAR Bae CNP on 7/27/2022 at 3:37 PM

## 2022-07-27 NOTE — PROGRESS NOTES
[place] [facility]  [facility] Record Number:  [id number]  Place of Service where encounter took place:  [facility] [[id number]]  Chief Complaint   Patient presents with     RECHECK       HPI:    Annabel May  is a 88 year old (5/12/1934), who is being seen today for an episodic care visit.  HPI information obtained from: facility chart records, facility staff, patient report and [facility] chart review. Today's concern is:    Seen today for fall follow up and ongoing constipation. Appears unsteady on her feet but ok with ROM to upper and lower extremities. VSS. Abdomen continues to appear very distended above baseline. Staff reporting an extra large bowel movement yesterday. Last imaging was negative for bowel obstruction.  History of extra colon, obstruction, constipation. No nausea or vomiting noted in facility chart review.     Past Medical and Surgical History reviewed in Epic today.    MEDICATIONS:    Current Outpatient Medications   Medication Sig Dispense Refill     acetaminophen (ACETAMINOPHEN EXTRA STRENGTH) 500 MG tablet TAKE TWO TABLETS (1000MG) BY MOUTH TWICE DAILY;& TAKE (1000MG)  BY MOUTH DAILY AS NEEDED FOR PAIN 186 tablet 97     AZOPT 1 % ophthalmic suspension INSTILL ONE DROP IN EACH EYE TWICE DAILY 10 mL 97     benzocaine (ANBESOL) 10 % gel Take by mouth 3 times daily And once daily prn 9 g 3     bisacodyl (DULCOLAX) 10 MG suppository UNWRAP AND INSERT ONE SUPPOSITORY RECTALLY EVERY THREE DAYS AND DAILY AS NEEDED FOR CONSTIPATION 10 suppository 97     chlorhexidine (PERIDEX) 0.12 % solution Swish and spit 15 mLs in mouth 2 times daily       ELIQUIS ANTICOAGULANT 2.5 MG tablet TAKE 1 TABLET BY MOUTH TWICE DAILY 56 tablet 97     escitalopram (LEXAPRO) 10 MG tablet TAKE 1 TABLET BY MOUTH EVERY MORNING 90 tablet 3     lactulose 20 GM/30ML SOLN Take 15 mLs (10 g) by mouth three times a week 473 mL 11     latanoprost (XALATAN) 0.005 % ophthalmic  "solution INSTILL ONE DROP IN EACH EYE AT BEDTIME 2.5 mL 97     levothyroxine (SYNTHROID/LEVOTHROID) 88 MCG tablet TAKE 1 TABLET BY MOUTH ONCE DAILY 90 tablet 3     lisinopril (ZESTRIL) 2.5 MG tablet Take 1 tablet (2.5 mg) by mouth daily 90 tablet 3     MELATONIN MAXIMUM STRENGTH 5 MG tablet TAKE 1 TABLET BY MOUTH AT BEDTIME 28 tablet 97     metoprolol tartrate (LOPRESSOR) 25 MG tablet Take 1 tablet (25 mg) by mouth 2 times daily 30 tablet 11     MILK OF MAGNESIA 1200 MG/15ML suspension TAKE 30ML BY MOUTH ONCE DAILY WITH PRUNE JUICE 710 mL 97     mirtazapine (REMERON) 15 MG tablet TAKE 1 TABLET BY MOUTH AT BEDTIME  90 tablet 3     Multiple Vitamins-Minerals (PRESERVISION AREDS) CAPS TAKE 1 CAPSULE BY MOUTH TWICE DAILY 180 capsule 97     polyethylene glycol (MIRALAX) 17 GM/Dose powder MIX 17GM OF POWDER IN 8OZ OF WATER UNTIL COMPLETELY DISSOLVED. DRINK SOLUTION DAILY;& GIVE 2ND DOSE TWICE DAILY ON MONDAY, WEDNESDAY AND FRIDAY 510 g 97     QUEtiapine (SEROQUEL) 25 MG tablet Take 1.5 tablets (37.5 mg) by mouth 3 times daily. May also take 1 tablet (25 mg) 2 times daily as needed (aggression/dementia related psychosis). 84 tablet PRN     senna-docusate (SENEXON-S) 8.6-50 MG tablet TAKE TWO TABLETS BY MOUTH TWICE DAILY. DO NOT HOLD FOR LOOSE STOOL AND UPDATE PROVIDER FOR POSSIBLEBOWEL IMPACTION. 112 tablet 97     Sodium Phosphates (ENEMA) 7-19 GM/118ML ENEM USE AS DIRECTED DAILY AS NEEDED FOR STOOL IMPACTION 399 mL PRN     traZODone (DESYREL) 50 MG tablet TAKE 1 TABLET BY MOUTH AT BEDTIME 90 tablet 3     VITAMIN D3 25 MCG (1000 UT) tablet TAKE ONE TABLET (25MCG) BY MOUTH DAILY 90 tablet 97     REVIEW OF SYSTEMS:  Unobtainable secondary to cognitive impairment.     Objective:  /80   Pulse 74   Temp 97.6  F (36.4  C)   Resp 20   Ht 1.727 m (5' 8\")   Wt 56.4 kg (124 lb 6.4 oz)   SpO2 100%   BMI 18.91 kg/m    Exam:  GENERAL APPEARANCE:  Alert, anxious at baseline, less tearful today, worried look on " face  ENT:  Mouth and posterior oropharynx normal, dry mucous membranes, normal hearing acuity  EYES: lids, pupils and irises normal  RESP:  respiratory effort and palpation of chest normal, lungs clear to auscultation but diminished   CV:  Palpation and auscultation of heart done , regular rate and rhythm, no murmur, rub, or gallop, trace edema  ABDOMEN: BS, abdominal area is rounded and very distended above baseline, firm. Hyperactive  BS  M/S:   Gait and station at baseline-ambulates w/o assistive device-mostly steady    SKIN:  Inspection of skin and subcutaneous tissue baseline to visualized areas  NEURO:   Cranial nerves 2-12 are normal tested and grossly at patient's baseline  PSYCH:  insight and judgement impaired, memory impaired     Labs:   CBC RESULTS: Recent Labs   Lab Test 06/17/22  0934 01/27/22  0905   WBC 6.3 5.3   RBC 3.89 3.84   HGB 12.5 12.6   HCT 39.0 42.3    110*   MCH 32.1 32.8   MCHC 32.1 29.8*   RDW 14.0 13.7    184       Last Basic Metabolic Panel:  Recent Labs   Lab Test 06/17/22  0934 01/27/22  0905    141   POTASSIUM 4.1 3.9   CHLORIDE 110* 111*   AMERICO 9.0 9.1   CO2 27 25   BUN 22 17   CR 1.11* 0.86   GLC 96 56*       Liver Function Studies -   Recent Labs   Lab Test 06/17/22  0934 10/21/21  0708   PROTTOTAL 6.9 6.1*   ALBUMIN 3.8 3.2*   BILITOTAL 0.5 0.4   ALKPHOS 73 63   AST 20 15   ALT 16 12       TSH   Date Value Ref Range Status   10/21/2021 3.99 0.40 - 4.00 mU/L Final   12/23/2020 4.80 (H) 0.40 - 4.00 mU/L Final   11/25/2020 8.30 (H) 0.40 - 4.00 mU/L Final       No results found for: A1C    ASSESSMENT/PLAN:  (K56.49) Impaction of the bowels (H)  (primary encounter diagnosis)  (K59.01) Slow transit constipation  Comment: acute and chronic-previous bladder scan negative for urine retention   Plan:  -Enema daily prn  -supp daily prn and q3 days if no BM  -milk of mag with prune juice daily   -starting lactulose 15mL 3 times weekly increased to 4x weekly prn    -continue Senna S 2 tabs po BID   -continue Miralax daily and twice daily Mon/Wed/Fri    (W19.XXXS) Fall, sequela  Comment: at risk for additional falls   Plan:   -unable to use walker for ambulation 2/2 dementia  -in community room as able for additional oversight (although fall was in common area)     (E03.9) Acquired hypothyroidism  Comment: chronic   Plan:   -TSH recheck   -Levothyroxine 88 mcg po daily       Electronically signed by:  ZAFAR Bae CNP

## 2022-08-24 NOTE — LETTER
Annabel May     1. Increase Lactulose to    lactulose 20 GM/30ML SOLN Take 30 mLs (20 g) by mouth daily         ZAFAR Bae CNP on 8/25/2022 at 10:28 AM

## 2022-08-24 NOTE — PROGRESS NOTES
[place] [facility]  [facility] Record Number:  [id number]  Place of Service where encounter took place:  [facility] [[id number]]  Chief Complaint   Patient presents with     RECHECK       HPI:    Annabel May  is a 88 year old (5/12/1934), who is being seen today for an episodic care visit.  HPI information obtained from: facility chart records, facility staff and [facility] chart review. Today's concern is:    Follow up to ongoing slow transit constipation, impaction of bowels, abdominal distension. At baseline known history of large redundant colon. Abdomen continues to appear distended above baseline. Notes indicate more agitated and resistant to suppositories and enemas. Milk of magnesium daily was discontinued per pharmacy recommendations with known CKD.  Most recent medication change was increase to lactulose from 3x weekly to 4x weekly. Seen today in her [facility] room. She is nonsensical at baseline with advanced dementia. Has a worried look on her face which is not new. Most recent imaging was non diagnostic as she was unable to be still for the x-ray.     Past Medical and Surgical History reviewed in Epic today.    MEDICATIONS:    Current Outpatient Medications   Medication Sig Dispense Refill     lactulose 20 GM/30ML SOLN Take 30 mLs (20 g) by mouth daily 473 mL 11     acetaminophen (ACETAMINOPHEN EXTRA STRENGTH) 500 MG tablet TAKE TWO TABLETS (1000MG) BY MOUTH TWICE DAILY;& TAKE (1000MG)  BY MOUTH DAILY AS NEEDED FOR PAIN 186 tablet 97     AZOPT 1 % ophthalmic suspension INSTILL ONE DROP IN EACH EYE TWICE DAILY 10 mL 97     benzocaine (ANBESOL) 10 % gel Take by mouth 3 times daily And once daily prn 9 g 3     bisacodyl (DULCOLAX) 10 MG suppository UNWRAP AND INSERT ONE SUPPOSITORY RECTALLY EVERY THREE DAYS AND DAILY AS NEEDED FOR CONSTIPATION 10 suppository 97     chlorhexidine (PERIDEX) 0.12 % solution Swish and spit 15 mLs in mouth 2 times daily        "ELIQUIS ANTICOAGULANT 2.5 MG tablet TAKE 1 TABLET BY MOUTH TWICE DAILY 56 tablet 97     escitalopram (LEXAPRO) 10 MG tablet TAKE 1 TABLET BY MOUTH EVERY MORNING 90 tablet 3     latanoprost (XALATAN) 0.005 % ophthalmic solution INSTILL ONE DROP IN EACH EYE AT BEDTIME 2.5 mL 97     levothyroxine (SYNTHROID/LEVOTHROID) 88 MCG tablet TAKE 1 TABLET BY MOUTH ONCE DAILY 90 tablet 3     lisinopril (ZESTRIL) 2.5 MG tablet Take 1 tablet (2.5 mg) by mouth daily 90 tablet 3     MELATONIN MAXIMUM STRENGTH 5 MG tablet TAKE 1 TABLET BY MOUTH AT BEDTIME 28 tablet 97     metoprolol tartrate (LOPRESSOR) 25 MG tablet Take 1 tablet (25 mg) by mouth 2 times daily 30 tablet 11     mirtazapine (REMERON) 15 MG tablet TAKE 1 TABLET BY MOUTH AT BEDTIME  90 tablet 3     Multiple Vitamins-Minerals (PRESERVISION AREDS) CAPS TAKE 1 CAPSULE BY MOUTH TWICE DAILY 180 capsule 97     polyethylene glycol (MIRALAX) 17 GM/Dose powder MIX 17GM OF POWDER IN 8OZ OF WATER UNTIL COMPLETELY DISSOLVED. DRINK SOLUTION DAILY;& GIVE 2ND DOSE TWICE DAILY ON MONDAY, WEDNESDAY AND FRIDAY 510 g 97     QUEtiapine (SEROQUEL) 25 MG tablet Take 1.5 tablets (37.5 mg) by mouth 3 times daily. May also take 1 tablet (25 mg) 2 times daily as needed (aggression/dementia related psychosis). 84 tablet PRN     senna-docusate (SENEXON-S) 8.6-50 MG tablet TAKE TWO TABLETS BY MOUTH TWICE DAILY. DO NOT HOLD FOR LOOSE STOOL AND UPDATE PROVIDER FOR POSSIBLEBOWEL IMPACTION. 112 tablet 97     Sodium Phosphates (ENEMA) 7-19 GM/118ML ENEM USE AS DIRECTED DAILY AS NEEDED FOR STOOL IMPACTION 399 mL PRN     traZODone (DESYREL) 50 MG tablet TAKE 1 TABLET BY MOUTH AT BEDTIME 90 tablet 3     VITAMIN D3 25 MCG (1000 UT) tablet TAKE ONE TABLET (25MCG) BY MOUTH DAILY 90 tablet 97     REVIEW OF SYSTEMS:  Unobtainable secondary to cognitive impairment.     Objective:  /89   Pulse 85   Temp (!) 96.5  F (35.8  C)   Resp 16   Ht 1.727 m (5' 8\")   Wt 58.5 kg (129 lb)   SpO2 96%   BMI 19.61 " kg/m    Exam:  GENERAL APPEARANCE:  Alert, anxious at baseline, tearful and worried look on face  ENT:  Mouth and posterior oropharynx normal, dry mucous membranes, normal hearing acuity  EYES: lids, pupils and irises normal  RESP:  respiratory effort and palpation of chest normal, lungs clear to auscultation but diminished   CV:  Palpation and auscultation of heart done , regular rate and rhythm, no murmur, rub, or gallop, trace edema  ABDOMEN: BS, abdominal area is rounded and very distended, slightly firm. Some nonverbal indications of discomfort with palpation   M/S:   Gait and station at baseline-ambulates w/o assistive device-mostly steady    SKIN:  Inspection of skin and subcutaneous tissue baseline to visualized areas  NEURO:   Cranial nerves 2-12 are normal tested and grossly at patient's baseline  PSYCH:  insight and judgement impaired, memory impaired     Labs:   CBC RESULTS: Recent Labs   Lab Test 06/17/22  0934 01/27/22  0905   WBC 6.3 5.3   RBC 3.89 3.84   HGB 12.5 12.6   HCT 39.0 42.3    110*   MCH 32.1 32.8   MCHC 32.1 29.8*   RDW 14.0 13.7    184       Last Basic Metabolic Panel:  Recent Labs   Lab Test 06/17/22  0934 01/27/22  0905    141   POTASSIUM 4.1 3.9   CHLORIDE 110* 111*   AMERICO 9.0 9.1   CO2 27 25   BUN 22 17   CR 1.11* 0.86   GLC 96 56*       Liver Function Studies -   Recent Labs   Lab Test 06/17/22  0934 10/21/21  0708   PROTTOTAL 6.9 6.1*   ALBUMIN 3.8 3.2*   BILITOTAL 0.5 0.4   ALKPHOS 73 63   AST 20 15   ALT 16 12       TSH   Date Value Ref Range Status   07/28/2022 2.49 0.40 - 4.00 mU/L Final   10/21/2021 3.99 0.40 - 4.00 mU/L Final   12/23/2020 4.80 (H) 0.40 - 4.00 mU/L Final   11/25/2020 8.30 (H) 0.40 - 4.00 mU/L Final     No results found for: A1C    CT ABDOMEN AND PELVIS WITH CONTRAST 6/17/2022     CLINICAL HISTORY: Abdominal pain. Bowel obstruction suspected.     TECHNIQUE: CT scan of the abdomen and pelvis was performed following  injection of IV contrast.  Multiplanar reformats were obtained. Dose  reduction techniques were used.  CONTRAST: 62mL Isovue-370     COMPARISON: None.     FINDINGS:   LOWER CHEST: Mild bilateral atelectasis and/or infiltrate, left worse  than right. Trace pleural fluid bilaterally. Trace pericardial fluid.  Cardiomegaly.     HEPATOBILIARY: No significant mass or bile duct dilatation. No  calcified gallstones.      PANCREAS: No significant mass, duct dilatation, or inflammatory  change.     SPLEEN: Normal size.     ADRENAL GLANDS: No significant nodules.     KIDNEYS/BLADDER: No significant mass, stones, or hydronephrosis. There  are simple or benign cysts. No follow up is needed.     BOWEL: No obstruction or inflammatory change. Moderate to large amount  of stool.     PELVIC ORGANS: No pelvic masses.     ADDITIONAL FINDINGS: No ascites. There are moderate atherosclerotic  changes of the visualized aorta and its branches. There is no evidence  of aortic dissection or aneurysm.     MUSCULOSKELETAL: No frankly destructive bony lesions.                                                                      IMPRESSION:   1.  Mild bilateral lower lobe atelectasis and/or infiltrate.  2.  No bowel obstruction demonstrated, moderate to large amount of  stool.     BARI CORREIA MD      ASSESSMENT/PLAN:  (K59.01) Slow transit constipation  (primary encounter diagnosis)  (K56.49) Impaction of the bowels (H)  (R14.0) Abdominal distention  Comment: chronic-previous bladder scan negative for urine retention   Plan:  -Enema daily prn  -supp daily prn and q3 days if no BM   -starting lactulose 30mL daily   -continue Senna S 2 tabs po BID   -continue Miralax daily and twice daily Mon/Wed/Fri      Electronically signed by:  ZAFAR Bae CNP

## 2022-08-24 NOTE — LETTER
8/24/2022        RE: Annabel May  C/o Annabel May  [address] [address]        [facility] [facility]  [place] Record Number:  [id number]  Place of Service where encounter took place:  [facility] [[id number]]  Chief Complaint   Patient presents with     RECHECK       HPI:    Annabel May  is a 88 year old (5/12/1934), who is being seen today for an episodic care visit.  HPI information obtained from: facility chart records, facility staff and [facility] chart review. Today's concern is:    Follow up to ongoing slow transit constipation, impaction of bowels, abdominal distension. At baseline known history of large redundant colon. Abdomen continues to appear distended above baseline. Notes indicate more agitated and resistant to suppositories and enemas. Milk of magnesium daily was discontinued per pharmacy recommendations with known CKD.  Most recent medication change was increase to lactulose from 3x weekly to 4x weekly. Seen today in her [facility] room. She is nonsensical at baseline with advanced dementia. Has a worried look on her face which is not new. Most recent imaging was non diagnostic as she was unable to be still for the x-ray.     Past Medical and Surgical History reviewed in Epic today.    MEDICATIONS:    Current Outpatient Medications   Medication Sig Dispense Refill     lactulose 20 GM/30ML SOLN Take 30 mLs (20 g) by mouth daily 473 mL 11     acetaminophen (ACETAMINOPHEN EXTRA STRENGTH) 500 MG tablet TAKE TWO TABLETS (1000MG) BY MOUTH TWICE DAILY;& TAKE (1000MG)  BY MOUTH DAILY AS NEEDED FOR PAIN 186 tablet 97     AZOPT 1 % ophthalmic suspension INSTILL ONE DROP IN EACH EYE TWICE DAILY 10 mL 97     benzocaine (ANBESOL) 10 % gel Take by mouth 3 times daily And once daily prn 9 g 3     bisacodyl (DULCOLAX) 10 MG suppository UNWRAP AND INSERT ONE SUPPOSITORY RECTALLY EVERY THREE DAYS AND DAILY AS NEEDED FOR CONSTIPATION 10  suppository 97     chlorhexidine (PERIDEX) 0.12 % solution Swish and spit 15 mLs in mouth 2 times daily       ELIQUIS ANTICOAGULANT 2.5 MG tablet TAKE 1 TABLET BY MOUTH TWICE DAILY 56 tablet 97     escitalopram (LEXAPRO) 10 MG tablet TAKE 1 TABLET BY MOUTH EVERY MORNING 90 tablet 3     latanoprost (XALATAN) 0.005 % ophthalmic solution INSTILL ONE DROP IN EACH EYE AT BEDTIME 2.5 mL 97     levothyroxine (SYNTHROID/LEVOTHROID) 88 MCG tablet TAKE 1 TABLET BY MOUTH ONCE DAILY 90 tablet 3     lisinopril (ZESTRIL) 2.5 MG tablet Take 1 tablet (2.5 mg) by mouth daily 90 tablet 3     MELATONIN MAXIMUM STRENGTH 5 MG tablet TAKE 1 TABLET BY MOUTH AT BEDTIME 28 tablet 97     metoprolol tartrate (LOPRESSOR) 25 MG tablet Take 1 tablet (25 mg) by mouth 2 times daily 30 tablet 11     mirtazapine (REMERON) 15 MG tablet TAKE 1 TABLET BY MOUTH AT BEDTIME  90 tablet 3     Multiple Vitamins-Minerals (PRESERVISION AREDS) CAPS TAKE 1 CAPSULE BY MOUTH TWICE DAILY 180 capsule 97     polyethylene glycol (MIRALAX) 17 GM/Dose powder MIX 17GM OF POWDER IN 8OZ OF WATER UNTIL COMPLETELY DISSOLVED. DRINK SOLUTION DAILY;& GIVE 2ND DOSE TWICE DAILY ON MONDAY, WEDNESDAY AND FRIDAY 510 g 97     QUEtiapine (SEROQUEL) 25 MG tablet Take 1.5 tablets (37.5 mg) by mouth 3 times daily. May also take 1 tablet (25 mg) 2 times daily as needed (aggression/dementia related psychosis). 84 tablet PRN     senna-docusate (SENEXON-S) 8.6-50 MG tablet TAKE TWO TABLETS BY MOUTH TWICE DAILY. DO NOT HOLD FOR LOOSE STOOL AND UPDATE PROVIDER FOR POSSIBLEBOWEL IMPACTION. 112 tablet 97     Sodium Phosphates (ENEMA) 7-19 GM/118ML ENEM USE AS DIRECTED DAILY AS NEEDED FOR STOOL IMPACTION 399 mL PRN     traZODone (DESYREL) 50 MG tablet TAKE 1 TABLET BY MOUTH AT BEDTIME 90 tablet 3     VITAMIN D3 25 MCG (1000 UT) tablet TAKE ONE TABLET (25MCG) BY MOUTH DAILY 90 tablet 97     REVIEW OF SYSTEMS:  Unobtainable secondary to cognitive impairment.     Objective:  /89   Pulse 85    "Temp (!) 96.5  F (35.8  C)   Resp 16   Ht 1.727 m (5' 8\")   Wt 58.5 kg (129 lb)   SpO2 96%   BMI 19.61 kg/m    Exam:  GENERAL APPEARANCE:  Alert, anxious at baseline, tearful and worried look on face  ENT:  Mouth and posterior oropharynx normal, dry mucous membranes, normal hearing acuity  EYES: lids, pupils and irises normal  RESP:  respiratory effort and palpation of chest normal, lungs clear to auscultation but diminished   CV:  Palpation and auscultation of heart done , regular rate and rhythm, no murmur, rub, or gallop, trace edema  ABDOMEN: BS, abdominal area is rounded and very distended, slightly firm. Some nonverbal indications of discomfort with palpation   M/S:   Gait and station at baseline-ambulates w/o assistive device-mostly steady    SKIN:  Inspection of skin and subcutaneous tissue baseline to visualized areas  NEURO:   Cranial nerves 2-12 are normal tested and grossly at patient's baseline  PSYCH:  insight and judgement impaired, memory impaired     Labs:   CBC RESULTS: Recent Labs   Lab Test 06/17/22  0934 01/27/22  0905   WBC 6.3 5.3   RBC 3.89 3.84   HGB 12.5 12.6   HCT 39.0 42.3    110*   MCH 32.1 32.8   MCHC 32.1 29.8*   RDW 14.0 13.7    184       Last Basic Metabolic Panel:  Recent Labs   Lab Test 06/17/22  0934 01/27/22  0905    141   POTASSIUM 4.1 3.9   CHLORIDE 110* 111*   AMERICO 9.0 9.1   CO2 27 25   BUN 22 17   CR 1.11* 0.86   GLC 96 56*       Liver Function Studies -   Recent Labs   Lab Test 06/17/22  0934 10/21/21  0708   PROTTOTAL 6.9 6.1*   ALBUMIN 3.8 3.2*   BILITOTAL 0.5 0.4   ALKPHOS 73 63   AST 20 15   ALT 16 12       TSH   Date Value Ref Range Status   07/28/2022 2.49 0.40 - 4.00 mU/L Final   10/21/2021 3.99 0.40 - 4.00 mU/L Final   12/23/2020 4.80 (H) 0.40 - 4.00 mU/L Final   11/25/2020 8.30 (H) 0.40 - 4.00 mU/L Final     No results found for: A1C    CT ABDOMEN AND PELVIS WITH CONTRAST 6/17/2022     CLINICAL HISTORY: Abdominal pain. Bowel obstruction " suspected.     TECHNIQUE: CT scan of the abdomen and pelvis was performed following  injection of IV contrast. Multiplanar reformats were obtained. Dose  reduction techniques were used.  CONTRAST: 62mL Isovue-370     COMPARISON: None.     FINDINGS:   LOWER CHEST: Mild bilateral atelectasis and/or infiltrate, left worse  than right. Trace pleural fluid bilaterally. Trace pericardial fluid.  Cardiomegaly.     HEPATOBILIARY: No significant mass or bile duct dilatation. No  calcified gallstones.      PANCREAS: No significant mass, duct dilatation, or inflammatory  change.     SPLEEN: Normal size.     ADRENAL GLANDS: No significant nodules.     KIDNEYS/BLADDER: No significant mass, stones, or hydronephrosis. There  are simple or benign cysts. No follow up is needed.     BOWEL: No obstruction or inflammatory change. Moderate to large amount  of stool.     PELVIC ORGANS: No pelvic masses.     ADDITIONAL FINDINGS: No ascites. There are moderate atherosclerotic  changes of the visualized aorta and its branches. There is no evidence  of aortic dissection or aneurysm.     MUSCULOSKELETAL: No frankly destructive bony lesions.                                                                      IMPRESSION:   1.  Mild bilateral lower lobe atelectasis and/or infiltrate.  2.  No bowel obstruction demonstrated, moderate to large amount of  stool.     BARI CORREIA MD      ASSESSMENT/PLAN:  (K59.01) Slow transit constipation  (primary encounter diagnosis)  (K56.49) Impaction of the bowels (H)  (R14.0) Abdominal distention  Comment: chronic-previous bladder scan negative for urine retention   Plan:  -Enema daily prn  -supp daily prn and q3 days if no BM   -starting lactulose 30mL daily   -continue Senna S 2 tabs po BID   -continue Miralax daily and twice daily Mon/Wed/Fri      Electronically signed by:  ZAFAR Bae CNP               Sincerely,        ZAFAR Bae CNP

## 2022-09-07 NOTE — LETTER
9/7/2022        RE: Annabel May  C/o Annabel May  [address] [facility] [address]        [place] [facility]  [facility] Medical Record Number:  [id number]  Place of Service where encounter took place:  [facility] [[id number]]  Chief Complaint   Patient presents with     RECHECK       HPI:    Annabel May  is a 88 year old (5/12/1934), who is being seen today for an episodic care visit.  HPI information obtained from: facility staff. Today's concern is:    Follow up to ongoing slow transit constipation. PMH of impaction of bowels, abdominal distension, redundant colon.  Staff reporting that weekly enema has been helping to empty her bowels and improve her comfort. Lactulose 20g po daily started at previous visit 8/24. Facility chart review and bowel movements every 1-3 days. She is calm and pleasant today, more agreeable for exam, less anxious.     Past Medical and Surgical History reviewed in Epic today.    MEDICATIONS:    Current Outpatient Medications   Medication Sig Dispense Refill     acetaminophen (ACETAMINOPHEN EXTRA STRENGTH) 500 MG tablet TAKE TWO TABLETS (1000MG) BY MOUTH TWICE DAILY;& TAKE (1000MG)  BY MOUTH DAILY AS NEEDED FOR PAIN 186 tablet 97     AZOPT 1 % ophthalmic suspension INSTILL ONE DROP IN EACH EYE TWICE DAILY 10 mL 97     benzocaine (ANBESOL) 10 % gel Take by mouth 3 times daily And once daily prn 9 g 3     bisacodyl (DULCOLAX) 10 MG suppository UNWRAP AND INSERT ONE SUPPOSITORY RECTALLY EVERY THREE DAYS AND DAILY AS NEEDED FOR CONSTIPATION 10 suppository 97     chlorhexidine (PERIDEX) 0.12 % solution Swish and spit 15 mLs in mouth 2 times daily       ELIQUIS ANTICOAGULANT 2.5 MG tablet TAKE 1 TABLET BY MOUTH TWICE DAILY 56 tablet 97     escitalopram (LEXAPRO) 10 MG tablet TAKE 1 TABLET BY MOUTH EVERY MORNING 90 tablet 3     lactulose 20 GM/30ML SOLN Take 30 mLs (20 g) by mouth daily 473 mL 11     latanoprost (XALATAN) 0.005 %  "ophthalmic solution INSTILL ONE DROP IN EACH EYE AT BEDTIME 2.5 mL 97     levothyroxine (SYNTHROID/LEVOTHROID) 88 MCG tablet TAKE 1 TABLET BY MOUTH ONCE DAILY 90 tablet 3     lisinopril (ZESTRIL) 2.5 MG tablet Take 1 tablet (2.5 mg) by mouth daily 90 tablet 3     MELATONIN MAXIMUM STRENGTH 5 MG tablet TAKE 1 TABLET BY MOUTH AT BEDTIME 28 tablet 97     metoprolol tartrate (LOPRESSOR) 25 MG tablet Take 1 tablet (25 mg) by mouth 2 times daily 30 tablet 11     mirtazapine (REMERON) 15 MG tablet TAKE 1 TABLET BY MOUTH AT BEDTIME  90 tablet 3     Multiple Vitamins-Minerals (PRESERVISION AREDS) CAPS TAKE 1 CAPSULE BY MOUTH TWICE DAILY 180 capsule 97     polyethylene glycol (MIRALAX) 17 GM/Dose powder MIX 17GM OF POWDER IN 8OZ OF WATER UNTIL COMPLETELY DISSOLVED. DRINK SOLUTION DAILY;& GIVE 2ND DOSE TWICE DAILY ON MONDAY, WEDNESDAY AND FRIDAY 510 g 97     QUEtiapine (SEROQUEL) 25 MG tablet Take 1.5 tablets (37.5 mg) by mouth 3 times daily. May also take 1 tablet (25 mg) 2 times daily as needed (aggression/dementia related psychosis). 84 tablet PRN     senna-docusate (SENEXON-S) 8.6-50 MG tablet TAKE TWO TABLETS BY MOUTH TWICE DAILY. DO NOT HOLD FOR LOOSE STOOL AND UPDATE PROVIDER FOR POSSIBLEBOWEL IMPACTION. 112 tablet 97     Sodium Phosphates (ENEMA) 7-19 GM/118ML ENEM USE AS DIRECTED DAILY AS NEEDED FOR STOOL IMPACTION 399 mL PRN     traZODone (DESYREL) 50 MG tablet TAKE 1 TABLET BY MOUTH AT BEDTIME 90 tablet 3     VITAMIN D3 25 MCG (1000 UT) tablet TAKE ONE TABLET (25MCG) BY MOUTH DAILY 90 tablet 97     REVIEW OF SYSTEMS:  Limited secondary to cognitive impairment but today pt reports ok    Objective:  /88   Pulse 80   Temp 97.9  F (36.6  C)   Resp 18   Ht 1.727 m (5' 8\")   Wt 58.1 kg (128 lb)   SpO2 99%   BMI 19.46 kg/m    Exam:  GENERAL APPEARANCE:  Alert,  Less anxious   ENT:  Mouth and posterior oropharynx normal, dry mucous membranes, normal hearing acuity  EYES: lids, pupils and irises " normal  RESP:  respiratory effort and palpation of chest normal, lungs clear to auscultation but diminished   CV:  Palpation and auscultation of heart done , regular rate and rhythm, no murmur, rub, or gallop, trace edema  ABDOMEN: BS, abdominal area is rounded and less distended, slightly firm  M/S:   Gait and station at baseline-ambulates w/o assistive device-mostly steady    SKIN:  Inspection of skin and subcutaneous tissue baseline to visualized areas  NEURO:   Cranial nerves 2-12 are normal tested and grossly at patient's baseline  PSYCH:  insight and judgement impaired, memory impaired     Labs:   CBC RESULTS: Recent Labs   Lab Test 06/17/22  0934 01/27/22  0905   WBC 6.3 5.3   RBC 3.89 3.84   HGB 12.5 12.6   HCT 39.0 42.3    110*   MCH 32.1 32.8   MCHC 32.1 29.8*   RDW 14.0 13.7    184       Last Basic Metabolic Panel:  Recent Labs   Lab Test 06/17/22  0934 01/27/22  0905    141   POTASSIUM 4.1 3.9   CHLORIDE 110* 111*   AMERICO 9.0 9.1   CO2 27 25   BUN 22 17   CR 1.11* 0.86   GLC 96 56*       Liver Function Studies -   Recent Labs   Lab Test 06/17/22  0934 10/21/21  0708   PROTTOTAL 6.9 6.1*   ALBUMIN 3.8 3.2*   BILITOTAL 0.5 0.4   ALKPHOS 73 63   AST 20 15   ALT 16 12       TSH   Date Value Ref Range Status   07/28/2022 2.49 0.40 - 4.00 mU/L Final   10/21/2021 3.99 0.40 - 4.00 mU/L Final   12/23/2020 4.80 (H) 0.40 - 4.00 mU/L Final   11/25/2020 8.30 (H) 0.40 - 4.00 mU/L Final       No results found for: A1C    ASSESSMENT/PLAN:  (K59.01) Slow transit constipation  (primary encounter diagnosis)  (K56.49) Impaction of the bowels (H)  (R14.0) Abdominal distention  Comment: chronic-previous bladder scan negative for urine retention- may need Ativan prn for enema administration   Plan:  -Enema daily prn AND weekly  -supp daily prn and q3 days if no BM   -lactulose 30mL daily   -continue Senna S 2 tabs po BID   -continue Miralax daily and twice daily Mon/Wed/Fri                   Electronically  signed by:  ZAFAR Bae CNP               Sincerely,        ZAFAR Bae CNP

## 2022-09-07 NOTE — LETTER
Annabel May    1. Increase to    Sodium Phosphates (ENEMA) 7-19 GM/118ML ENEM ONCE WEEKLY AND DAILY AS NEEDED FOR STOOL IMPACTION     ZAFAR Bae CNP on 9/7/2022 at 1:20 PM

## 2022-09-07 NOTE — PROGRESS NOTES
[place] [facility]  [facility] Record Number:  [id number]  Place of Service where encounter took place:  [facility] [[id number]]  Chief Complaint   Patient presents with     RECHECK       Addendum: nursing also reporting she is continuously refusing eye drops and medications so reviewed to reduce meds as able   -discontinue  AZOPT, Lisinopril, Preservision, vitamin D    HPI:    Annabel May  is a 88 year old (5/12/1934), who is being seen today for an episodic care visit.  HPI information obtained from: facility staff. Today's concern is:    Follow up to ongoing slow transit constipation. PMH of impaction of bowels, abdominal distension, redundant colon.  Staff reporting that weekly enema has been helping to empty her bowels and improve her comfort. Lactulose 20g po daily started at previous visit 8/24. Facility chart review and bowel movements every 1-3 days. She is calm and pleasant today, more agreeable for exam, less anxious.     Past Medical and Surgical History reviewed in Epic today.    MEDICATIONS:    Current Outpatient Medications   Medication Sig Dispense Refill     acetaminophen (ACETAMINOPHEN EXTRA STRENGTH) 500 MG tablet TAKE TWO TABLETS (1000MG) BY MOUTH TWICE DAILY;& TAKE (1000MG)  BY MOUTH DAILY AS NEEDED FOR PAIN 186 tablet 97     AZOPT 1 % ophthalmic suspension INSTILL ONE DROP IN EACH EYE TWICE DAILY 10 mL 97     benzocaine (ANBESOL) 10 % gel Take by mouth 3 times daily And once daily prn 9 g 3     bisacodyl (DULCOLAX) 10 MG suppository UNWRAP AND INSERT ONE SUPPOSITORY RECTALLY EVERY THREE DAYS AND DAILY AS NEEDED FOR CONSTIPATION 10 suppository 97     chlorhexidine (PERIDEX) 0.12 % solution Swish and spit 15 mLs in mouth 2 times daily       ELIQUIS ANTICOAGULANT 2.5 MG tablet TAKE 1 TABLET BY MOUTH TWICE DAILY 56 tablet 97     escitalopram (LEXAPRO) 10 MG tablet TAKE 1 TABLET BY MOUTH EVERY MORNING 90 tablet 3     lactulose 20 GM/30ML SOLN Take 30  "mLs (20 g) by mouth daily 473 mL 11     latanoprost (XALATAN) 0.005 % ophthalmic solution INSTILL ONE DROP IN EACH EYE AT BEDTIME 2.5 mL 97     levothyroxine (SYNTHROID/LEVOTHROID) 88 MCG tablet TAKE 1 TABLET BY MOUTH ONCE DAILY 90 tablet 3     lisinopril (ZESTRIL) 2.5 MG tablet Take 1 tablet (2.5 mg) by mouth daily 90 tablet 3     MELATONIN MAXIMUM STRENGTH 5 MG tablet TAKE 1 TABLET BY MOUTH AT BEDTIME 28 tablet 97     metoprolol tartrate (LOPRESSOR) 25 MG tablet Take 1 tablet (25 mg) by mouth 2 times daily 30 tablet 11     mirtazapine (REMERON) 15 MG tablet TAKE 1 TABLET BY MOUTH AT BEDTIME  90 tablet 3     Multiple Vitamins-Minerals (PRESERVISION AREDS) CAPS TAKE 1 CAPSULE BY MOUTH TWICE DAILY 180 capsule 97     polyethylene glycol (MIRALAX) 17 GM/Dose powder MIX 17GM OF POWDER IN 8OZ OF WATER UNTIL COMPLETELY DISSOLVED. DRINK SOLUTION DAILY;& GIVE 2ND DOSE TWICE DAILY ON MONDAY, WEDNESDAY AND FRIDAY 510 g 97     QUEtiapine (SEROQUEL) 25 MG tablet Take 1.5 tablets (37.5 mg) by mouth 3 times daily. May also take 1 tablet (25 mg) 2 times daily as needed (aggression/dementia related psychosis). 84 tablet PRN     senna-docusate (SENEXON-S) 8.6-50 MG tablet TAKE TWO TABLETS BY MOUTH TWICE DAILY. DO NOT HOLD FOR LOOSE STOOL AND UPDATE PROVIDER FOR POSSIBLEBOWEL IMPACTION. 112 tablet 97     Sodium Phosphates (ENEMA) 7-19 GM/118ML ENEM USE AS DIRECTED DAILY AS NEEDED FOR STOOL IMPACTION 399 mL PRN     traZODone (DESYREL) 50 MG tablet TAKE 1 TABLET BY MOUTH AT BEDTIME 90 tablet 3     VITAMIN D3 25 MCG (1000 UT) tablet TAKE ONE TABLET (25MCG) BY MOUTH DAILY 90 tablet 97     REVIEW OF SYSTEMS:  Limited secondary to cognitive impairment but today pt reports ok    Objective:  /88   Pulse 80   Temp 97.9  F (36.6  C)   Resp 18   Ht 1.727 m (5' 8\")   Wt 58.1 kg (128 lb)   SpO2 99%   BMI 19.46 kg/m    Exam:  GENERAL APPEARANCE:  Alert,  Less anxious   ENT:  Mouth and posterior oropharynx normal, dry mucous membranes, " normal hearing acuity  EYES: lids, pupils and irises normal  RESP:  respiratory effort and palpation of chest normal, lungs clear to auscultation but diminished   CV:  Palpation and auscultation of heart done , regular rate and rhythm, no murmur, rub, or gallop, trace edema  ABDOMEN: BS, abdominal area is rounded and less distended, slightly firm  M/S:   Gait and station at baseline-ambulates w/o assistive device-mostly steady    SKIN:  Inspection of skin and subcutaneous tissue baseline to visualized areas  NEURO:   Cranial nerves 2-12 are normal tested and grossly at patient's baseline  PSYCH:  insight and judgement impaired, memory impaired     Labs:   CBC RESULTS: Recent Labs   Lab Test 06/17/22  0934 01/27/22  0905   WBC 6.3 5.3   RBC 3.89 3.84   HGB 12.5 12.6   HCT 39.0 42.3    110*   MCH 32.1 32.8   MCHC 32.1 29.8*   RDW 14.0 13.7    184       Last Basic Metabolic Panel:  Recent Labs   Lab Test 06/17/22  0934 01/27/22  0905    141   POTASSIUM 4.1 3.9   CHLORIDE 110* 111*   AMERICO 9.0 9.1   CO2 27 25   BUN 22 17   CR 1.11* 0.86   GLC 96 56*       Liver Function Studies -   Recent Labs   Lab Test 06/17/22  0934 10/21/21  0708   PROTTOTAL 6.9 6.1*   ALBUMIN 3.8 3.2*   BILITOTAL 0.5 0.4   ALKPHOS 73 63   AST 20 15   ALT 16 12       TSH   Date Value Ref Range Status   07/28/2022 2.49 0.40 - 4.00 mU/L Final   10/21/2021 3.99 0.40 - 4.00 mU/L Final   12/23/2020 4.80 (H) 0.40 - 4.00 mU/L Final   11/25/2020 8.30 (H) 0.40 - 4.00 mU/L Final       No results found for: A1C    ASSESSMENT/PLAN:  (K59.01) Slow transit constipation  (primary encounter diagnosis)  (K56.49) Impaction of the bowels (H)  (R14.0) Abdominal distention  (F41.9) Anxiety  Comment: chronic-previous bladder scan negative for urine retention- may need Ativan prn for enema administration   Plan:  -Enema daily prn AND weekly  -supp daily prn and q3 days if no BM   -lactulose 30mL daily   -continue Senna S 2 tabs po BID   -continue  Miralax daily and twice daily Mon/Wed/Fri  -ativan prior to administration of enema if needed                    Electronically signed by:  ZAFAR Bae CNP

## 2022-09-28 NOTE — PROGRESS NOTES
"Annabel May is a 88 year old female seen September 28, 2022 at [facility] unit for 2 and a half years (admit 1/2020) seen to follow up progressive Alzheimer's dementia with anxious and agitative features.  She is seen on the unit, up to chair having a breakfast roll.   States \"I don't want to be out.\"   Feels a little anxious out of her room and says \"When I get mad I want my mom.\"   She is not able to answer questions in a meaningful way or attend to conversation.   Needs assistance taking off her jacket and becomes a little anxious about it.       By chart review, patient has had several years of worsening STML.   Over past year she has lost language and mobility, with falls reported.   She was hospitalized in January 2020 for dementia with agitation.   She no longer recognized her  and represented higher care needs than could be provided in AL.   She was given ceftriaxone for UTI with sensitive E coli 50-100k on UC, and bowel regimen increased for large amount of stool seen on abd CT.   She was seen by Psychiatry and quetiapine dose increased. Agitation improved some with these measures and she was discharged directly to Memory Care unit.    Pt was seen in the ED in September 2020 for paresthesias in her hands, unclear etiology on workup.     Seen in ED in June 2022 for constipation, but no fecal impaction or obstruction seen on imaging.      Past Medical History:   Diagnosis Date     Arrhythmia     1AVB, wenckebach  Pacemaker placed 2015     Arthritis      Breast cancer (H)  S/p bilateral mastectomy      Hyperlipidaemia      Hypertension      Hypothyroid      Migraines      PONV (postoperative nausea and vomiting)      Syncope    Late onset dementia, Alzheimer's type  Anxiety    Past Surgical History:   Procedure Laterality Date     APPENDECTOMY       BREAST SURGERY      tee. profilactic mastectomy     CHOLECYSTECTOMY       ENT SURGERY      partial thyroidectomy     GLAUCOMA SURGERY Left " "10/16/2017     GYN SURGERY      hystereectomy     HEAD & NECK SURGERY       HYSTERECTOMY       PHACOEMULSIFICATION CLEAR CORNEA W/ STANDARD IOL IMPLANT, ENDOSCOPIC CYCLOPHOTOCOAGULATION, COMBINED  6/28/2012    Procedure: COMBINED PHACOEMULSIFICATION CLEAR CORNEA WITH STANDARD INTRAOCULAR LENS IMPLANT, ENDOSCOPIC CYCLOPHOTOCOAGULATION;  RIGHT COMBINED PHACOEMULSIFICATION CLEAR CORNEA WITH STANDARD INTRAOCULAR LENS IMPLANT, ENDOSCOPIC CYCLOPHOTOCOAGULATION ;  Surgeon: Vinay Duque MD;  Location: [facility]     SH:  Previously lived with her  at the [facility].   She has a son Kelvin who is a radiologist; daughter Manoj  Non smoker    REVIEW OF SYSTEMS: Unobtainable secondary to cognitive impairment and loss of language.   Ambulatory with 4WW, +falls   At baseline weight.    Wt Readings from Last 5 Encounters:   09/28/22 58.1 kg (128 lb)   09/06/22 58.1 kg (128 lb)   08/24/22 58.5 kg (129 lb)   07/27/22 56.4 kg (124 lb 6.4 oz)   06/22/22 56.4 kg (124 lb 6.4 oz)      EXAM: NAD  /69   Pulse 78   Temp 97.3  F (36.3  C)   Resp 18   Ht 1.727 m (5' 8\")   Wt 58.1 kg (128 lb)   SpO2 96%   BMI 19.46 kg/m     Neck supple without adenopathy  Lungs clear bilaterally with fair air movement  Heart RRR s1s2 with I/VI JESSICA  Abd soft, NT, no distention or guarding, +BS  Ext without edema  Neuro: limited verbal skills, no stiffness, mild intention tremor     Psych: affect anxious    Last Comprehensive Metabolic Panel:  Sodium   Date Value Ref Range Status   06/17/2022 143 133 - 144 mmol/L Final     Potassium   Date Value Ref Range Status   06/17/2022 4.1 3.4 - 5.3 mmol/L Final     Carbon Dioxide (CO2)   Date Value Ref Range Status   06/17/2022 27 20 - 32 mmol/L Final     Glucose   Date Value Ref Range Status   06/17/2022 96 70 - 99 mg/dL Final     Urea Nitrogen   Date Value Ref Range Status   06/17/2022 22 7 - 30 mg/dL Final     Creatinine   Date Value Ref Range Status   06/17/2022 1.11 (H) 0.52 - 1.04 mg/dL " Final     GFR Estimate   Date Value Ref Range Status   06/17/2022 48 (L) >60 mL/min/1.73m2 Final     Calcium   Date Value Ref Range Status   06/17/2022 9.0 8.5 - 10.1 mg/dL Final     Lab Results   Component Value Date    WBC 6.3 06/17/2022      HGB 12.5 06/17/2022       06/17/2022       06/17/2022          ASSESSMENT/PLAN:  (R07.89) Discomfort of chest wall    (Z95.0) Cardiac pacemaker in situ  Comment: has not reported this recently   Plan: Padded shirts to take the place of subcutaneous tissue   Scheduled acetaminophen 1000 mg bid and PRN for any associated discomfort  No further pacemaker checks per family, goals of care    (F41.9) Anxiety  Comment: longstanding, worsened by cognitive decline, disorientation and loss of coping skills.      Plan: mirtazapine 15 mg/ at HS, trazodone 50 mg/HS and escitalopram 10 mg/day  Also on melatonin 5 mg/HS for sleep    (F03.91) Dementia with behavioral disturbance, unspecified dementia type (H)  Comment: progressive cognitive decline with loss of language and functional status.     Plan: [place] [facility] Care unit for assist with meds, meals, activity, secure unit.     She has been on scheduled quetiapine 37.5 mg tid and prn for agitation since seen by Psychiatry in the hospital.       (I48.20) Chronic atrial fibrillation  Comment:   Pulse Readings from Last 4 Encounters:   09/28/22 78   09/06/22 80   08/24/22 85   07/27/22 74      Plan: metoprolol 25 mg bid for VR control, and apixaban 2.5 mg bid for stroke prophylaxis       (M81.0) Age-related osteoporosis without current pathological fracture  Comment: she was on alendronate 8-9 years    Remains a fall risk  Plan: would add maintenance dose of vit D      (E03.9) Hypothyroidism, unspecified type  Comment:   TSH   Date Value Ref Range Status   07/28/2022 2.49 0.40 - 4.00 mU/L Final   12/23/2020 4.80 (H) 0.40 - 4.00 mU/L Final      Plan: levothyroxine 88 mcg/day, yearly TSH       (K59.01) Slow transit  constipation  Comment: needs continual monitoring  Had ED visit earlier this year   Plan: Senna, Miralax, prn supp or enema  Has needed a PRN dose of lorazepam before receiving the enema.     Jennifer Agarwal MD

## 2022-09-28 NOTE — LETTER
"    9/28/2022        RE: Annabel May  C/o Annabel May  [address] [facility] [address]        Annabel May is a 88 year old female seen September 28, 2022 at [facility] unit for 2 and a half years (admit 1/2020) seen to follow up progressive Alzheimer's dementia with anxious and agitative features.  She is seen on the unit, up to chair having a breakfast roll.   States \"I don't want to be out.\"   Feels a little anxious out of her room and says \"When I get mad I want my mom.\"   She is not able to answer questions in a meaningful way or attend to conversation.   Needs assistance taking off her jacket and becomes a little anxious about it.       By chart review, patient has had several years of worsening STML.   Over past year she has lost language and mobility, with falls reported.   She was hospitalized in January 2020 for dementia with agitation.   She no longer recognized her  and represented higher care needs than could be provided in AL.   She was given ceftriaxone for UTI with sensitive E coli 50-100k on UC, and bowel regimen increased for large amount of stool seen on abd CT.   She was seen by Psychiatry and quetiapine dose increased. Agitation improved some with these measures and she was discharged directly to Memory Care unit.    Pt was seen in the ED in September 2020 for paresthesias in her hands, unclear etiology on workup.     Seen in ED in June 2022 for constipation, but no fecal impaction or obstruction seen on imaging.      Past Medical History:   Diagnosis Date     Arrhythmia     1AVB, wenckebach  Pacemaker placed 2015     Arthritis      Breast cancer (H)  S/p bilateral mastectomy      Hyperlipidaemia      Hypertension      Hypothyroid      Migraines      PONV (postoperative nausea and vomiting)      Syncope    Late onset dementia, Alzheimer's type  Anxiety    Past Surgical History:   Procedure Laterality Date     APPENDECTOMY       BREAST SURGERY      tee. " "profilactic mastectomy     CHOLECYSTECTOMY       ENT SURGERY      partial thyroidectomy     GLAUCOMA SURGERY Left 10/16/2017     GYN SURGERY      hystereectomy     HEAD & NECK SURGERY       HYSTERECTOMY       PHACOEMULSIFICATION CLEAR CORNEA W/ STANDARD IOL IMPLANT, ENDOSCOPIC CYCLOPHOTOCOAGULATION, COMBINED  6/28/2012    Procedure: COMBINED PHACOEMULSIFICATION CLEAR CORNEA WITH STANDARD INTRAOCULAR LENS IMPLANT, ENDOSCOPIC CYCLOPHOTOCOAGULATION;  RIGHT COMBINED PHACOEMULSIFICATION CLEAR CORNEA WITH STANDARD INTRAOCULAR LENS IMPLANT, ENDOSCOPIC CYCLOPHOTOCOAGULATION ;  Surgeon: Vinay Duque MD;  Location: [facility]     SH:  Previously lived with her  at the [facility].   She has a son Kelvin who is a radiologist; daughter Manoj  Non smoker    REVIEW OF SYSTEMS: Unobtainable secondary to cognitive impairment and loss of language.   Ambulatory with 4WW, +falls   At baseline weight.    Wt Readings from Last 5 Encounters:   09/28/22 58.1 kg (128 lb)   09/06/22 58.1 kg (128 lb)   08/24/22 58.5 kg (129 lb)   07/27/22 56.4 kg (124 lb 6.4 oz)   06/22/22 56.4 kg (124 lb 6.4 oz)      EXAM: NAD  /69   Pulse 78   Temp 97.3  F (36.3  C)   Resp 18   Ht 1.727 m (5' 8\")   Wt 58.1 kg (128 lb)   SpO2 96%   BMI 19.46 kg/m     Neck supple without adenopathy  Lungs clear bilaterally with fair air movement  Heart RRR s1s2 with I/VI JESSICA  Abd soft, NT, no distention or guarding, +BS  Ext without edema  Neuro: limited verbal skills, no stiffness, mild intention tremor     Psych: affect anxious    Last Comprehensive Metabolic Panel:  Sodium   Date Value Ref Range Status   06/17/2022 143 133 - 144 mmol/L Final     Potassium   Date Value Ref Range Status   06/17/2022 4.1 3.4 - 5.3 mmol/L Final     Carbon Dioxide (CO2)   Date Value Ref Range Status   06/17/2022 27 20 - 32 mmol/L Final     Glucose   Date Value Ref Range Status   06/17/2022 96 70 - 99 mg/dL Final     Urea Nitrogen   Date Value Ref Range Status "   06/17/2022 22 7 - 30 mg/dL Final     Creatinine   Date Value Ref Range Status   06/17/2022 1.11 (H) 0.52 - 1.04 mg/dL Final     GFR Estimate   Date Value Ref Range Status   06/17/2022 48 (L) >60 mL/min/1.73m2 Final     Calcium   Date Value Ref Range Status   06/17/2022 9.0 8.5 - 10.1 mg/dL Final     Lab Results   Component Value Date    WBC 6.3 06/17/2022      HGB 12.5 06/17/2022       06/17/2022       06/17/2022          ASSESSMENT/PLAN:  (R07.89) Discomfort of chest wall    (Z95.0) Cardiac pacemaker in situ  Comment: has not reported this recently   Plan: Padded shirts to take the place of subcutaneous tissue   Scheduled acetaminophen 1000 mg bid and PRN for any associated discomfort  No further pacemaker checks per family, goals of care    (F41.9) Anxiety  Comment: longstanding, worsened by cognitive decline, disorientation and loss of coping skills.      Plan: mirtazapine 15 mg/ at HS, trazodone 50 mg/HS and escitalopram 10 mg/day  Also on melatonin 5 mg/HS for sleep    (F03.91) Dementia with behavioral disturbance, unspecified dementia type (H)  Comment: progressive cognitive decline with loss of language and functional status.     Plan: [facility] Memory Care unit for assist with meds, meals, activity, secure unit.     She has been on scheduled quetiapine 37.5 mg tid and prn for agitation since seen by Psychiatry in the hospital.       (I48.20) Chronic atrial fibrillation  Comment:   Pulse Readings from Last 4 Encounters:   09/28/22 78   09/06/22 80   08/24/22 85   07/27/22 74      Plan: metoprolol 25 mg bid for VR control, and apixaban 2.5 mg bid for stroke prophylaxis       (M81.0) Age-related osteoporosis without current pathological fracture  Comment: she was on alendronate 8-9 years    Remains a fall risk  Plan: would add maintenance dose of vit D      (E03.9) Hypothyroidism, unspecified type  Comment:   TSH   Date Value Ref Range Status   07/28/2022 2.49 0.40 - 4.00 mU/L Final   12/23/2020 4.80  (H) 0.40 - 4.00 mU/L Final      Plan: levothyroxine 88 mcg/day, yearly TSH       (K59.01) Slow transit constipation  Comment: needs continual monitoring  Had ED visit earlier this year   Plan: Senna, Miralax, prn supp or enema  Has needed a PRN dose of lorazepam before receiving the enema.     Jennifer Agarwal MD             Sincerely,        Jennifer Agarwal MD

## 2022-10-07 NOTE — TELEPHONE ENCOUNTER
[facility] TELEPHONE ENCOUNTER    Chief Complaint   Patient presents with     Anxiety       Annabel May is a 88 year old  (5/12/1934),Nurse called today to report: nursing called to request increase in daily ativan prn mostly used prior to receiving an enema. Long hx of inability to move her bowel without. Ativan has been helpful but yesterday needed to use 0.5 mg po once in order for staff to administer enema. She did have good results post enema    ASSESSMENT/PLAN    -Increase to    LORazepam (ATIVAN) 0.5 MG tablet Take 1 tablet (0.5 mg) by mouth daily as needed for agitation or anxiety           Electronically signed by:   ZAFAR Bae CNP

## 2022-10-13 NOTE — PROGRESS NOTES
Ativan used for prn enema administration. Staff has less agitation, anxiety and aggression with higher dose given once.     Plan:  1. Increase Ativan to    LORazepam (ATIVAN) 0.5 MG tablet Take 2 tablets (1 mg) by mouth daily as needed for agitation or anxiety     Reese Morfin, ZAFAR CNP on 10/13/2022 at 11:50 AM

## 2022-10-15 NOTE — ED NOTES
Bed: ED27  Expected date:   Expected time:   Means of arrival:   Comments:  Imelda - 88 Fall on thinners eta 1430

## 2022-10-15 NOTE — ED TRIAGE NOTES
Pt was going to sit in a chair and fell hitting her right arm and head, on eliquis, no loc, hx dementia, was given 1mg IM ativan

## 2022-10-15 NOTE — ED PROVIDER NOTES
History   Chief Complaint:  Fall     History limited by: patient's dementia.      Annabel May is a 88 year old female, on Eliquis, with history of dementia, thyroid disorder, hypertension, persistent atrial fibrillation, arrhythmia, and breast cancer, who presents via EMS after a fall in her memory care unit. Patient's history and reason for presentation is limited due to her dementia.    Review of Systems   Unable to perform ROS: Dementia       Allergies:  Midazolam  Vecuronium    Medications:  Bisacodyl  Eliquis  Escitalopram  Levothyroxine  Lorazepam  Melatonin  Metoprolol tartrate  Mirtazapine  Polyethylene glycol  Quetiapine  Senna-docusate  Sodium phosphate  Trazodone    Past Medical History:     Thyroid disorder  Hypertension  Hyperlipidemia  Syncope  Cardiac dysrhythmia  Arrhythmia  Primary open angle glaucoma of both eyes, severe  Dementia with behavioral disturbance  Persistent atrial fibrillation  Primary osteoarthritis  Glaucoma  Macular degeneration of retina  Insomnia  Vitamin B12 deficiency  Osteopenia  CKD, stage 3  Breast cancer  Moderate episode of recurrent major depressive disorder    Past Surgical History:    Appendectomy  Breast surgery  Cholecystectomy  Partial thyroidectomy  Glaucoma surgery, left  Hysterectomy  Phacoemulsification of clear cornea with standard IOL implant  Colonoscopy  Endocyclophotocoagulation  Ahmed valve insertion  Mastectomy    Family History:    Neoplasm of colon  Hypertension  Alzheimer's disease  Hypercholesterolemia  Migraine    Social History:  The patient presents to the ED via EMS.  PCP: Reese Morfin     Physical Exam     Patient Vitals for the past 24 hrs:   BP Temp Temp src Pulse Resp SpO2   10/15/22 1825 (!) 173/84 -- -- 77 -- 97 %   10/15/22 1438 (!) 154/85 98  F (36.7  C) Temporal 68 14 100 %       Physical Exam  Eye:  Pupils are equal, round, and reactive.  Extraocular movements intact.    ENT:  No rhinorrhea.  Moist mucus membranes.  Normal  tongue and tonsil.    Cardiac:  Regular rate and rhythm.  No murmurs, gallops, or rubs.    Pulmonary:  Clear to auscultation bilaterally.  No wheezes, rales, or rhonchi.    Abdomen:  Positive bowel sounds.  Abdomen is soft and non-distended, without focal tenderness.    Musculoskeletal:  Normal movement of all extremities without evidence for deficit. No midline tenderness to the neck. No evidence of trauma to the skull.    Skin:  Warm and dry without rashes.    Neurologic:  Non-focal exam without asymmetric weakness or numbness. Patient unable to formally participate.    Psychiatric:  Patient is markedly demented and anxious. Unable to provide any history regarding today's events.    Emergency Department Course     Imaging:  Head CT w/o contrast   Final Result   IMPRESSION:   1.  No CT evidence for acute intracranial process.   2.  Chronic changes, as above.        Report per radiology    Emergency Department Course:     Reviewed:  I reviewed nursing notes, vitals, past medical history and Care Everywhere    Assessments:  1556 I obtained history and examined the patient as noted above.   1829 I spoke with the sun who is comfortable with the patient's discharge.     Interventions:  1614 Haldol 5mg IM    Disposition:  The patient was discharged to home.     Impression & Plan     Medical Decision Making:  This unfortunate elderly woman with a history of advanced dementia presents to us after suffering a mechanical fall today when she was trying to sit in a chair, lost her balance and fell backwards, striking her head on the ground.  Her physical exam does not show any evidence of obvious trauma.  She is anticoagulated, and with her advanced age, head CT was obtained which is negative.  She did require sedation, first with 1 mg of lorazepam from EMS and I gave her 5 mg of Haldol to help calm her so she could get through the studies.  I spoke with her son who is comfortable having her return to her facility.  Otherwise,  I see no other significant signs of trauma or at significant she is safe for return to her facility      Diagnosis:    ICD-10-CM    1. Closed head injury, initial encounter  S09.90XA       2. Dementia, unspecified dementia severity, unspecified dementia type, unspecified whether behavioral, psychotic, or mood disturbance or anxiety (H)  F03.90           Discharge Medications:  New Prescriptions    No medications on file       Scribe Disclosure:  [employer]ired, am serving as a scribe at 3:56 PM on 10/15/2022 to document services personally performed by Trierweiler, Chad A, MD based on my observations and the provider's statements to me.        Trierweiler, Chad A, MD  10/16/22 1145

## 2022-10-26 NOTE — LETTER
10/26/2022        RE: Annabel May  C/o Annabel May  [address] [facility] [facility] [address]        [facility]  [facility] Record Number:  [id number]  Place of Service where encounter took place:  [facility] [[id number]]  Chief Complaint   Patient presents with     RECHECK       HPI:    Annabel May  is a 88 year old (5/12/1934), who is being seen today for an episodic care visit.  HPI information obtained from: facility chart records and facility staff. Today's concern is:    Seen for follow up to fall with ED visit on 10/15/22 and ongoing constipation. Pill load was also reduced including discontinuing vitamin D as hardship and resistant to take medications. She is up wandering around. Has a worried look on her face which is not new. Now using increase dose of prn ativan prior to administering suppository as she is combative with this but unable to move bowel without assistance. PMH of impaction of bowels, abdominal distension, redundant colon.  Staff reporting that weekly enema has been helping to empty her bowels and improve her comfort. Lactulose 20g po daily started 8/24 now on 30g daily. Had improved results with MOM and prune juice but cautioned to discontinue MOM from pharmacy med review with hx of CKD.  Facility chart review and bowel movements every 1-3 days.    Past Medical and Surgical History reviewed in Epic today.    MEDICATIONS:    Current Outpatient Medications   Medication Sig Dispense Refill     acetaminophen (ACETAMINOPHEN EXTRA STRENGTH) 500 MG tablet TAKE TWO TABLETS (1000MG) BY MOUTH TWICE DAILY;& TAKE (1000MG)  BY MOUTH DAILY AS NEEDED FOR PAIN 186 tablet 97     bisacodyl (DULCOLAX) 10 MG suppository UNWRAP AND INSERT ONE SUPPOSITORY RECTALLY EVERY THREE DAYS AND DAILY AS NEEDED FOR CONSTIPATION 10 suppository 97     ELIQUIS ANTICOAGULANT 2.5 MG tablet TAKE 1 TABLET BY MOUTH TWICE DAILY 56 tablet 97     escitalopram (LEXAPRO)  "10 MG tablet TAKE 1 TABLET BY MOUTH EVERY MORNING 90 tablet 3     lactulose 20 GM/30ML SOLN Take 30 mLs (20 g) by mouth daily 473 mL 11     latanoprost (XALATAN) 0.005 % ophthalmic solution INSTILL ONE DROP IN EACH EYE AT BEDTIME 2.5 mL 97     levothyroxine (SYNTHROID/LEVOTHROID) 88 MCG tablet TAKE 1 TABLET BY MOUTH ONCE DAILY 90 tablet 3     LORazepam (ATIVAN) 0.5 MG tablet Take 2 tablets (1 mg) by mouth daily as needed for agitation or anxiety 30 tablet 3     MELATONIN MAXIMUM STRENGTH 5 MG tablet TAKE 1 TABLET BY MOUTH AT BEDTIME 28 tablet 97     metoprolol tartrate (LOPRESSOR) 25 MG tablet Take 1 tablet (25 mg) by mouth 2 times daily 30 tablet 11     mirtazapine (REMERON) 15 MG tablet TAKE 1 TABLET BY MOUTH AT BEDTIME  90 tablet 3     polyethylene glycol (MIRALAX) 17 GM/Dose powder MIX 17GM OF POWDER IN 8OZ OF WATER UNTIL COMPLETELY DISSOLVED. DRINK SOLUTION DAILY;& GIVE 2ND DOSE TWICE DAILY ON MONDAY, WEDNESDAY AND FRIDAY 510 g 97     QUEtiapine (SEROQUEL) 25 MG tablet Take 1.5 tablets (37.5 mg) by mouth 3 times daily. May also take 1 tablet (25 mg) 2 times daily as needed (aggression/dementia related psychosis). 84 tablet PRN     senna-docusate (SENEXON-S) 8.6-50 MG tablet TAKE TWO TABLETS BY MOUTH TWICE DAILY. DO NOT HOLD FOR LOOSE STOOL AND UPDATE PROVIDER FOR POSSIBLEBOWEL IMPACTION. 112 tablet 97     Sodium Phosphates (ENEMA) 7-19 GM/118ML ENEM ONCE WEEKLY AND DAILY AS NEEDED FOR STOOL IMPACTION 399 mL PRN     traZODone (DESYREL) 50 MG tablet TAKE 1 TABLET BY MOUTH AT BEDTIME 90 tablet 3     REVIEW OF SYSTEMS:  Unobtainable secondary to cognitive impairment.     Objective:  BP (!) 141/69   Pulse 77   Temp 98.4  F (36.9  C)   Ht 1.727 m (5' 8\")   Wt 56.1 kg (123 lb 9.6 oz)   SpO2 97%   BMI 18.79 kg/m    Exam:  GENERAL APPEARANCE:  Alert,  anxious   ENT:  Mouth and posterior oropharynx normal, dry mucous membranes, normal hearing acuity  EYES: lids, pupils and irises normal  RESP:  respiratory effort " and palpation of chest normal, lungs clear to auscultation but diminished   CV:  Palpation and auscultation of heart done , regular rate and rhythm, no murmur, rub, or gallop, trace edema, pacer  ABDOMEN: BS hypoactive,  Abdominal area is rounded  and distended, slightly firm  M/S:   Gait and station at baseline-ambulates w/o assistive device-mostly steady    SKIN:  Inspection of skin and subcutaneous tissue-baseline to visualized areas  NEURO:   Cranial nerves 2-12 are normal tested and grossly at patient's baseline  PSYCH:  insight and judgement impaired, memory impaired     Labs:   CBC RESULTS: Recent Labs   Lab Test 06/17/22  0934 01/27/22  0905   WBC 6.3 5.3   RBC 3.89 3.84   HGB 12.5 12.6   HCT 39.0 42.3    110*   MCH 32.1 32.8   MCHC 32.1 29.8*   RDW 14.0 13.7    184       Last Basic Metabolic Panel:  Recent Labs   Lab Test 06/17/22  0934 01/27/22  0905    141   POTASSIUM 4.1 3.9   CHLORIDE 110* 111*   AMERICO 9.0 9.1   CO2 27 25   BUN 22 17   CR 1.11* 0.86   GLC 96 56*       Liver Function Studies -   Recent Labs   Lab Test 06/17/22  0934 10/21/21  0708   PROTTOTAL 6.9 6.1*   ALBUMIN 3.8 3.2*   BILITOTAL 0.5 0.4   ALKPHOS 73 63   AST 20 15   ALT 16 12       TSH   Date Value Ref Range Status   07/28/2022 2.49 0.40 - 4.00 mU/L Final   10/21/2021 3.99 0.40 - 4.00 mU/L Final   12/23/2020 4.80 (H) 0.40 - 4.00 mU/L Final   11/25/2020 8.30 (H) 0.40 - 4.00 mU/L Final       No results found for: A1C    ASSESSMENT/PLAN:  (W19.XXXS) Fall, sequela  (primary encounter diagnosis)  Comment: appears back to baseline   Plan:   -encouraged to common areas (although last fall in community room) for additional oversight     (K59.01) Slow transit constipation  (primary encounter diagnosis)  (K56.49) Impaction of the bowels (H)  (R14.0) Abdominal distention  (F41.9) Anxiety  Comment: chronic-previous bladder scan negative for urine retention- may need Ativan prn for enema administration   Plan:  -Enema daily prn  AND weekly  -supp daily prn and q3 days if no BM   -lactulose 30mL daily   -continue Senna S 2 tabs po BID   -Miralax to BID  -ativan prior to administration of enema . Increased to 1 mg on 10/13         Electronically signed by:  ZAFAR Bae CNP                 Sincerely,        ZAFAR Bae CNP

## 2022-10-26 NOTE — PROGRESS NOTES
[facility]  [facility] Record Number:  [id number]  Place of Service where encounter took place:  [facility] [[id number]]  Chief Complaint   Patient presents with     RECHECK       HPI:    Annabel May  is a 88 year old (5/12/1934), who is being seen today for an episodic care visit.  HPI information obtained from: facility chart records and facility staff. Today's concern is:    Seen for follow up to fall with ED visit on 10/15/22 and ongoing constipation. Pill load was also reduced including discontinuing vitamin D as hardship and resistant to take medications. She is up wandering around. Has a worried look on her face which is not new. Now using increase dose of prn ativan prior to administering suppository as she is combative with this but unable to move bowel without assistance. PMH of impaction of bowels, abdominal distension, redundant colon.  Staff reporting that weekly enema has been helping to empty her bowels and improve her comfort. Lactulose 20g po daily started 8/24 now on 30g daily. Had improved results with MOM and prune juice but cautioned to discontinue MOM from pharmacy med review with hx of CKD.  Facility chart review and bowel movements every 1-3 days.    Past Medical and Surgical History reviewed in Epic today.    MEDICATIONS:    Current Outpatient Medications   Medication Sig Dispense Refill     acetaminophen (ACETAMINOPHEN EXTRA STRENGTH) 500 MG tablet TAKE TWO TABLETS (1000MG) BY MOUTH TWICE DAILY;& TAKE (1000MG)  BY MOUTH DAILY AS NEEDED FOR PAIN 186 tablet 97     bisacodyl (DULCOLAX) 10 MG suppository UNWRAP AND INSERT ONE SUPPOSITORY RECTALLY EVERY THREE DAYS AND DAILY AS NEEDED FOR CONSTIPATION 10 suppository 97     ELIQUIS ANTICOAGULANT 2.5 MG tablet TAKE 1 TABLET BY MOUTH TWICE DAILY 56 tablet 97     escitalopram (LEXAPRO) 10 MG tablet TAKE 1 TABLET BY MOUTH EVERY MORNING 90 tablet 3     lactulose 20 GM/30ML SOLN Take 30 mLs (20  "g) by mouth daily 473 mL 11     latanoprost (XALATAN) 0.005 % ophthalmic solution INSTILL ONE DROP IN EACH EYE AT BEDTIME 2.5 mL 97     levothyroxine (SYNTHROID/LEVOTHROID) 88 MCG tablet TAKE 1 TABLET BY MOUTH ONCE DAILY 90 tablet 3     LORazepam (ATIVAN) 0.5 MG tablet Take 2 tablets (1 mg) by mouth daily as needed for agitation or anxiety 30 tablet 3     MELATONIN MAXIMUM STRENGTH 5 MG tablet TAKE 1 TABLET BY MOUTH AT BEDTIME 28 tablet 97     metoprolol tartrate (LOPRESSOR) 25 MG tablet Take 1 tablet (25 mg) by mouth 2 times daily 30 tablet 11     mirtazapine (REMERON) 15 MG tablet TAKE 1 TABLET BY MOUTH AT BEDTIME  90 tablet 3     polyethylene glycol (MIRALAX) 17 GM/Dose powder MIX 17GM OF POWDER IN 8OZ OF WATER UNTIL COMPLETELY DISSOLVED. DRINK SOLUTION DAILY;& GIVE 2ND DOSE TWICE DAILY ON MONDAY, WEDNESDAY AND FRIDAY 510 g 97     QUEtiapine (SEROQUEL) 25 MG tablet Take 1.5 tablets (37.5 mg) by mouth 3 times daily. May also take 1 tablet (25 mg) 2 times daily as needed (aggression/dementia related psychosis). 84 tablet PRN     senna-docusate (SENEXON-S) 8.6-50 MG tablet TAKE TWO TABLETS BY MOUTH TWICE DAILY. DO NOT HOLD FOR LOOSE STOOL AND UPDATE PROVIDER FOR POSSIBLEBOWEL IMPACTION. 112 tablet 97     Sodium Phosphates (ENEMA) 7-19 GM/118ML ENEM ONCE WEEKLY AND DAILY AS NEEDED FOR STOOL IMPACTION 399 mL PRN     traZODone (DESYREL) 50 MG tablet TAKE 1 TABLET BY MOUTH AT BEDTIME 90 tablet 3     REVIEW OF SYSTEMS:  Unobtainable secondary to cognitive impairment.     Objective:  BP (!) 141/69   Pulse 77   Temp 98.4  F (36.9  C)   Ht 1.727 m (5' 8\")   Wt 56.1 kg (123 lb 9.6 oz)   SpO2 97%   BMI 18.79 kg/m    Exam:  GENERAL APPEARANCE:  Alert,  anxious   ENT:  Mouth and posterior oropharynx normal, dry mucous membranes, normal hearing acuity  EYES: lids, pupils and irises normal  RESP:  respiratory effort and palpation of chest normal, lungs clear to auscultation but diminished   CV:  Palpation and auscultation " of heart done , regular rate and rhythm, no murmur, rub, or gallop, trace edema, pacer  ABDOMEN: BS hypoactive,  Abdominal area is rounded  and distended, slightly firm  M/S:   Gait and station at baseline-ambulates w/o assistive device-mostly steady    SKIN:  Inspection of skin and subcutaneous tissue-baseline to visualized areas  NEURO:   Cranial nerves 2-12 are normal tested and grossly at patient's baseline  PSYCH:  insight and judgement impaired, memory impaired     Labs:   CBC RESULTS: Recent Labs   Lab Test 06/17/22  0934 01/27/22  0905   WBC 6.3 5.3   RBC 3.89 3.84   HGB 12.5 12.6   HCT 39.0 42.3    110*   MCH 32.1 32.8   MCHC 32.1 29.8*   RDW 14.0 13.7    184       Last Basic Metabolic Panel:  Recent Labs   Lab Test 06/17/22  0934 01/27/22  0905    141   POTASSIUM 4.1 3.9   CHLORIDE 110* 111*   AMERICO 9.0 9.1   CO2 27 25   BUN 22 17   CR 1.11* 0.86   GLC 96 56*       Liver Function Studies -   Recent Labs   Lab Test 06/17/22  0934 10/21/21  0708   PROTTOTAL 6.9 6.1*   ALBUMIN 3.8 3.2*   BILITOTAL 0.5 0.4   ALKPHOS 73 63   AST 20 15   ALT 16 12       TSH   Date Value Ref Range Status   07/28/2022 2.49 0.40 - 4.00 mU/L Final   10/21/2021 3.99 0.40 - 4.00 mU/L Final   12/23/2020 4.80 (H) 0.40 - 4.00 mU/L Final   11/25/2020 8.30 (H) 0.40 - 4.00 mU/L Final       No results found for: A1C    ASSESSMENT/PLAN:  (W19.XXXS) Fall, sequela  (primary encounter diagnosis)  Comment: appears back to baseline   Plan:   -encouraged to common areas (although last fall in community room) for additional oversight     (K59.01) Slow transit constipation  (primary encounter diagnosis)  (K56.49) Impaction of the bowels (H)  (R14.0) Abdominal distention  (F41.9) Anxiety  Comment: chronic-previous bladder scan negative for urine retention- may need Ativan prn for enema administration   Plan:  -Enema daily prn AND weekly  -supp daily prn and q3 days if no BM   -lactulose 30mL daily   -continue Senna S 2 tabs po BID    -Miralax to BID  -ativan prior to administration of enema . Increased to 1 mg on 10/13         Electronically signed by:  ZAFAR Bae CNP

## 2022-10-26 NOTE — LETTER
Annabel May     1. Increase to   polyethylene glycol (MIRALAX) 17 GM/Dose powder MIX 17GM OF POWDER IN 8OZ OF WATER UNTIL COMPLETELY DISSOLVED. DRINK SOLUTION TWICE DAILY       ZAFAR Bae CNP on 10/26/2022 at 12:50 PM

## 2022-11-02 NOTE — TELEPHONE ENCOUNTER
[facility] TELEPHONE ENCOUNTER    No chief complaint on file.      Annabel May is a 88 year old  (5/12/1934),Nurse called today to report: increased anxiety. She previously was taken off Sertraline with loose stool and incontinent episodes but with ongoing constipation this would not be a concern now.    ASSESSMENT/PLAN      Discontinue escitalopram when Sertraline arrives    Begin Sertraline 25 mg po daily x 14 days then increase to 50 mg po daily ongoing       Electronically signed by:   ZAFAR Bae CNP

## 2022-12-21 NOTE — PROGRESS NOTES
[facility]  Chief Complaint   Patient presents with     Annual Comprehensive Nursing Home     [facility] [facility] Record Number:  [id number]  Place of Service where encounter took place:  [facility] [[id number]]    HPI:    Annabel May  is a 88 year old  (5/12/1934), who is being seen today for an annual comprehensive visit. HPI information obtained from: facility chart records, facility staff and [place] Epic chart review.  Today's concerns are:    Seen today for chronic disease management. Pill load was also reduced at previous visit as hardship and resistant to take medications. She is up wandering around. Tearful and nonsensical with advanced dementia. Doesn't always know her family per son Kelvin. Has a worried look on her face which is not new. Now using increase dose of prn ativan prior to administering suppository as she is combative with this but unable to move bowel without assistance. PMH of impaction of bowels, abdominal distension, redundant colon.  Staff reporting that weekly enema has been helping to empty her bowels and improve her comfort. Lactulose 20g po daily started 8/24 now on 30g daily. Had improved results with MOM and prune juice but cautioned to discontinue MOM from pharmacy med review with hx of CKD.  Facility chart review and bowel movements every 1-3 days.       ALLERGIES: Midazolam and Vecuronium  PAST MEDICAL HISTORY:  has a past medical history of Arrhythmia, Arthritis, Breast cancer (H), Hyperlipidaemia, Hypertension, Hypothyroid, Migraines, PONV (postoperative nausea and vomiting), and Syncope.    She has no past medical history of Malignant neoplasm (H).  PAST SURGICAL HISTORY:  has a past surgical history that includes Breast surgery; Head and neck surgery; ENT surgery; Hysterectomy; GYN surgery; appendectomy; Cholecystectomy; Phacoemulsification clear cornea with standard intraocular lens implant, endoscopic cyclophotocoagulation,  combined (6/28/2012); and Glaucoma surgery (Left, 10/16/2017).  IMMUNIZATIONS:  Immunization History   Administered Date(s) Administered     COVID-19 Vaccine 18+ (Moderna) 01/15/2021, 02/12/2021, 11/23/2021     Flu, Unspecified 10/23/2003, 10/20/2004, 10/17/2005, 10/23/2006, 09/24/2010     Q1k9-07 Novel Flu 12/15/2009     Influenza (H1N1) 12/15/2009     Influenza (High Dose) 3 valent vaccine 09/01/2015, 10/17/2015, 09/29/2016, 10/05/2017, 10/04/2018, 10/02/2019     Influenza (IIV3) PF 12/06/2000, 10/26/2001, 10/28/2002, 10/23/2003, 10/20/2004, 10/17/2005, 10/23/2006, 10/04/2008, 09/24/2010, 09/11/2012, 10/18/2013, 10/05/2017, 10/04/2018     Influenza Vaccine 50-64 or 18-64 w/egg allergy (Flublok) 09/23/2021     Influenza Vaccine 65+ (Fluzone HD) 09/23/2020, 10/05/2022     Pneumo Conj 13-V (2010&after) 08/02/2016     Pneumococcal 23 valent 05/21/2004, 07/10/2014     TDAP Vaccine (Adacel) 01/22/2020     Td (Adult), Adsorbed 05/16/2008     Zoster vaccine recombinant adjuvanted (SHINGRIX) 12/13/2018, 02/28/2019     Zoster vaccine, live 07/30/2007     Above immunizations pulled from [facility]. [facility] and facility records also reconciled. Outstanding information sent to  to update [facility].  Future immunizations are not needed at this point as all recommended immunizations are up to date.     Current Outpatient Medications   Medication Sig Dispense Refill     acetaminophen (ACETAMINOPHEN EXTRA STRENGTH) 500 MG tablet TAKE TWO TABLETS (1000MG) BY MOUTH TWICE DAILY;& TAKE (1000MG)  BY MOUTH DAILY AS NEEDED FOR PAIN 186 tablet 97     bisacodyl (DULCOLAX) 10 MG suppository UNWRAP AND INSERT ONE SUPPOSITORY RECTALLY EVERY THREE DAYS AND DAILY AS NEEDED FOR CONSTIPATION 10 suppository 97     ELIQUIS ANTICOAGULANT 2.5 MG tablet TAKE 1 TABLET BY MOUTH TWICE DAILY 56 tablet 97     lactulose 20 GM/30ML SOLN Take 30 mLs (20 g) by mouth daily 473 mL 11     latanoprost (XALATAN) 0.005 % ophthalmic solution  INSTILL ONE DROP IN EACH EYE AT BEDTIME 2.5 mL 97     levothyroxine (SYNTHROID/LEVOTHROID) 88 MCG tablet TAKE 1 TABLET BY MOUTH ONCE DAILY 90 tablet 3     LORazepam (ATIVAN) 0.5 MG tablet Take 2 tablets (1 mg) by mouth daily as needed for agitation or anxiety 30 tablet 3     MELATONIN MAXIMUM STRENGTH 5 MG tablet TAKE 1 TABLET BY MOUTH AT BEDTIME 28 tablet 97     metoprolol tartrate (LOPRESSOR) 25 MG tablet TAKE ONE TABLET (25MG) BY MOUTH TWICE DAILY 180 tablet 97     mirtazapine (REMERON) 15 MG tablet TAKE 1 TABLET BY MOUTH AT BEDTIME  90 tablet 3     polyethylene glycol (MIRALAX) 17 GM/Dose powder MIX 17GM OF POWDER IN 8OZ OF WATER UNTIL COMPLETELY DISSOLVED. DRINK SOLUTION TWICE DAILY 510 g 97     QUEtiapine (SEROQUEL) 25 MG tablet Take 1.5 tablets (37.5 mg) by mouth 3 times daily. May also take 1 tablet (25 mg) 2 times daily as needed (aggression/dementia related psychosis). 84 tablet PRN     senna-docusate (SENEXON-S) 8.6-50 MG tablet TAKE TWO TABLETS BY MOUTH TWICE DAILY. DO NOT HOLD FOR LOOSE STOOL AND UPDATE PROVIDER FOR POSSIBLEBOWEL IMPACTION. 112 tablet 97     sertraline (ZOLOFT) 25 MG tablet Take 1 tablet (25 mg) by mouth daily 25 mg po daily x 14 days then increase to 50 mg po daily 30 tablet 11     Sodium Phosphates (ENEMA) 7-19 GM/118ML ENEM ONCE WEEKLY AND DAILY AS NEEDED FOR STOOL IMPACTION 399 mL PRN     traZODone (DESYREL) 50 MG tablet TAKE 1 TABLET BY MOUTH AT BEDTIME 90 tablet 3     Case Management:  I have reviewed the Assisted Living care plan, current immunizations and preventive care/cancer screening. .Future cancer screening is not clinically indicated secondary to age/goals of care Patient's desire to return to the community is not assessible due to cognitive impairment. Current Level of Care is appropriate.    Advance Directive Discussion:    I reviewed the current advanced directives as reflected in EPIC, the POLST and the facility chart, and verified the congruency of orders. I  "contacted the first party son and discussed the plan of Care.  I did not due to cognitive impairment review the advance directives with the resident.     Team Discussion:  I communicated with the appropriate disciplines involved with the Plan of Care:   Nursing    Patient's goal is pain control and comfort. Treat reversible conditions   Information reviewed:  Medications, vital signs, orders, and nursing notes.    ROS:  Unobtainable secondary to cognitive impairment.     Vitals:  /69   Pulse 69   Temp 97.1  F (36.2  C)   Resp 18   Ht 1.727 m (5' 8\")   Wt 55.3 kg (122 lb)   SpO2 96%   BMI 18.55 kg/m   Body mass index is 18.55 kg/m .  Exam:  GENERAL APPEARANCE:  Alert,  anxious with worried look at times  ENT:  Mouth and posterior oropharynx normal, dry mucous membranes, normal hearing acuity  EYES: lids, pupils and irises normal  RESP:  respiratory effort and palpation of chest normal, lungs clear to auscultation but diminished   CV:  Palpation and auscultation of heart done , regular rate and rhythm, no murmur, rub, or gallop, trace edema, pacer  ABDOMEN: BS hypoactive,  Abdominal area is rounded  and distended, slightly firm  M/S:   Gait and station at baseline-ambulates w/o assistive device-mostly steady    SKIN:  Inspection of skin and subcutaneous tissue-baseline to visualized areas  NEURO:   Cranial nerves 2-12 are normal tested and grossly at patient's baseline  PSYCH:  insight and judgement impaired, memory impaired     Lab/Diagnostic data:   CBC RESULTS: Recent Labs   Lab Test 06/17/22  0934 01/27/22  0905   WBC 6.3 5.3   RBC 3.89 3.84   HGB 12.5 12.6   HCT 39.0 42.3    110*   MCH 32.1 32.8   MCHC 32.1 29.8*   RDW 14.0 13.7    184       Last Basic Metabolic Panel:  Recent Labs   Lab Test 06/17/22  0934 01/27/22  0905    141   POTASSIUM 4.1 3.9   CHLORIDE 110* 111*   AMERICO 9.0 9.1   CO2 27 25   BUN 22 17   CR 1.11* 0.86   GLC 96 56*       Liver Function Studies -   Recent Labs "   Lab Test 06/17/22  0934 10/21/21  0708   PROTTOTAL 6.9 6.1*   ALBUMIN 3.8 3.2*   BILITOTAL 0.5 0.4   ALKPHOS 73 63   AST 20 15   ALT 16 12       TSH   Date Value Ref Range Status   07/28/2022 2.49 0.40 - 4.00 mU/L Final   10/21/2021 3.99 0.40 - 4.00 mU/L Final   12/23/2020 4.80 (H) 0.40 - 4.00 mU/L Final   11/25/2020 8.30 (H) 0.40 - 4.00 mU/L Final       No results found for: A1C    ASSESSMENT/PLAN  (K59.01) Slow transit constipation  (primary encounter diagnosis)  (K56.49) Impaction of the bowels (H)  (R14.0) Abdominal distention  (F41.9) Anxiety  Comment: chronic-previous bladder scan negative for urine retention- Ativan prn for enema administration has been helpful. Could use adaptive clothing with hx of bowel movements in odd places   Plan:  -Enema daily prn AND weekly  -supp daily prn and q3 days if no BM   -lactulose 30mL daily   -continue Senna S 2 tabs po BID   -Miralax to BID  -ativan prior to administration of enema . Increased to 1 mg on 10/13    (I10) Benign essential hypertension  (I48.20) Chronic atrial fibrillation (H)  (Z95.0) Cardiac pacemaker in situ  Comment: stable   Plan:  -metoprolol 25 mg po BID  -eliquis 2.5 mg po BID  -BMP periodically     (E03.9) Acquired hypothyroidism  Comment: stable   Plan:   -TSH annually  -levothyroxine 88 mcg po daily     (F03.918) Dementia with behavioral disturbance  (G47.00) Insomnia, unspecified type  Comment: ongoing  Plan:   -continue current plan of care        Electronically signed by:  ZAFAR Bae CNP

## 2022-12-21 NOTE — LETTER
12/21/2022        RE: Annabel May  C/o Annabel May  [address] [facility] [address]        [facility] [facility]  Chief Complaint   Patient presents with     Annual Comprehensive Nursing Home     [facility] Record Number:  [id number]  Place of Service where encounter took place:  [facility] [[id number]]    HPI:    Annabel May  is a 88 year old  (5/12/1934), who is being seen today for an annual comprehensive visit. HPI information obtained from: facility chart records, facility staff and [facility] Epic chart review.  Today's concerns are:    Seen today for chronic disease management. Pill load was also reduced at previous visit as hardship and resistant to take medications. She is up wandering around. Tearful and nonsensical with advanced dementia. Doesn't always know her family per son Kelvin. Has a worried look on her face which is not new. Now using increase dose of prn ativan prior to administering suppository as she is combative with this but unable to move bowel without assistance. PMH of impaction of bowels, abdominal distension, redundant colon.  Staff reporting that weekly enema has been helping to empty her bowels and improve her comfort. Lactulose 20g po daily started 8/24 now on 30g daily. Had improved results with MOM and prune juice but cautioned to discontinue MOM from pharmacy med review with hx of CKD.  Facility chart review and bowel movements every 1-3 days.       ALLERGIES: Midazolam and Vecuronium  PAST MEDICAL HISTORY:  has a past medical history of Arrhythmia, Arthritis, Breast cancer (H), Hyperlipidaemia, Hypertension, Hypothyroid, Migraines, PONV (postoperative nausea and vomiting), and Syncope.    She has no past medical history of Malignant neoplasm (H).  PAST SURGICAL HISTORY:  has a past surgical history that includes Breast surgery; Head and neck surgery; ENT surgery; Hysterectomy; GYN surgery; appendectomy; Cholecystectomy;  Phacoemulsification clear cornea with standard intraocular lens implant, endoscopic cyclophotocoagulation, combined (6/28/2012); and Glaucoma surgery (Left, 10/16/2017).  IMMUNIZATIONS:  Immunization History   Administered Date(s) Administered     COVID-19 Vaccine 18+ (Moderna) 01/15/2021, 02/12/2021, 11/23/2021     Flu, Unspecified 10/23/2003, 10/20/2004, 10/17/2005, 10/23/2006, 09/24/2010     [id number] Novel Flu 12/15/2009     Influenza (H1N1) 12/15/2009     Influenza (High Dose) 3 valent vaccine 09/01/2015, 10/17/2015, 09/29/2016, 10/05/2017, 10/04/2018, 10/02/2019     Influenza (IIV3) PF 12/06/2000, 10/26/2001, 10/28/2002, 10/23/2003, 10/20/2004, 10/17/2005, 10/23/2006, 10/04/2008, 09/24/2010, 09/11/2012, 10/18/2013, 10/05/2017, 10/04/2018     Influenza Vaccine 50-64 or 18-64 w/egg allergy (Flublok) 09/23/2021     Influenza Vaccine 65+ (Fluzone HD) 09/23/2020, 10/05/2022     Pneumo Conj 13-V (2010&after) 08/02/2016     Pneumococcal 23 valent 05/21/2004, 07/10/2014     TDAP Vaccine (Adacel) 01/22/2020     Td (Adult), Adsorbed 05/16/2008     Zoster vaccine recombinant adjuvanted (SHINGRIX) 12/13/2018, 02/28/2019     Zoster vaccine, live 07/30/2007     Above immunizations pulled from [facility]. [facility] and facility records also reconciled. Outstanding information sent to  to update [facility].  Future immunizations are not needed at this point as all recommended immunizations are up to date.     Current Outpatient Medications   Medication Sig Dispense Refill     acetaminophen (ACETAMINOPHEN EXTRA STRENGTH) 500 MG tablet TAKE TWO TABLETS (1000MG) BY MOUTH TWICE DAILY;& TAKE (1000MG)  BY MOUTH DAILY AS NEEDED FOR PAIN 186 tablet 97     bisacodyl (DULCOLAX) 10 MG suppository UNWRAP AND INSERT ONE SUPPOSITORY RECTALLY EVERY THREE DAYS AND DAILY AS NEEDED FOR CONSTIPATION 10 suppository 97     ELIQUIS ANTICOAGULANT 2.5 MG tablet TAKE 1 TABLET BY MOUTH TWICE DAILY 56 tablet 97     lactulose 20 GM/30ML  SOLN Take 30 mLs (20 g) by mouth daily 473 mL 11     latanoprost (XALATAN) 0.005 % ophthalmic solution INSTILL ONE DROP IN EACH EYE AT BEDTIME 2.5 mL 97     levothyroxine (SYNTHROID/LEVOTHROID) 88 MCG tablet TAKE 1 TABLET BY MOUTH ONCE DAILY 90 tablet 3     LORazepam (ATIVAN) 0.5 MG tablet Take 2 tablets (1 mg) by mouth daily as needed for agitation or anxiety 30 tablet 3     MELATONIN MAXIMUM STRENGTH 5 MG tablet TAKE 1 TABLET BY MOUTH AT BEDTIME 28 tablet 97     metoprolol tartrate (LOPRESSOR) 25 MG tablet TAKE ONE TABLET (25MG) BY MOUTH TWICE DAILY 180 tablet 97     mirtazapine (REMERON) 15 MG tablet TAKE 1 TABLET BY MOUTH AT BEDTIME  90 tablet 3     polyethylene glycol (MIRALAX) 17 GM/Dose powder MIX 17GM OF POWDER IN 8OZ OF WATER UNTIL COMPLETELY DISSOLVED. DRINK SOLUTION TWICE DAILY 510 g 97     QUEtiapine (SEROQUEL) 25 MG tablet Take 1.5 tablets (37.5 mg) by mouth 3 times daily. May also take 1 tablet (25 mg) 2 times daily as needed (aggression/dementia related psychosis). 84 tablet PRN     senna-docusate (SENEXON-S) 8.6-50 MG tablet TAKE TWO TABLETS BY MOUTH TWICE DAILY. DO NOT HOLD FOR LOOSE STOOL AND UPDATE PROVIDER FOR POSSIBLEBOWEL IMPACTION. 112 tablet 97     sertraline (ZOLOFT) 25 MG tablet Take 1 tablet (25 mg) by mouth daily 25 mg po daily x 14 days then increase to 50 mg po daily 30 tablet 11     Sodium Phosphates (ENEMA) 7-19 GM/118ML ENEM ONCE WEEKLY AND DAILY AS NEEDED FOR STOOL IMPACTION 399 mL PRN     traZODone (DESYREL) 50 MG tablet TAKE 1 TABLET BY MOUTH AT BEDTIME 90 tablet 3     Case Management:  I have reviewed the Assisted Living care plan, current immunizations and preventive care/cancer screening. .Future cancer screening is not clinically indicated secondary to age/goals of care Patient's desire to return to the community is not assessible due to cognitive impairment. Current Level of Care is appropriate.    Advance Directive Discussion:    I reviewed the current advanced directives as  "reflected in EPIC, the POLST and the facility chart, and verified the congruency of orders. I contacted the first party son and discussed the plan of Care.  I did not due to cognitive impairment review the advance directives with the resident.     Team Discussion:  I communicated with the appropriate disciplines involved with the Plan of Care:   Nursing    Patient's goal is pain control and comfort. Treat reversible conditions   Information reviewed:  Medications, vital signs, orders, and nursing notes.    ROS:  Unobtainable secondary to cognitive impairment.     Vitals:  /69   Pulse 69   Temp 97.1  F (36.2  C)   Resp 18   Ht 1.727 m (5' 8\")   Wt 55.3 kg (122 lb)   SpO2 96%   BMI 18.55 kg/m   Body mass index is 18.55 kg/m .  Exam:  GENERAL APPEARANCE:  Alert,  anxious with worried look at times  ENT:  Mouth and posterior oropharynx normal, dry mucous membranes, normal hearing acuity  EYES: lids, pupils and irises normal  RESP:  respiratory effort and palpation of chest normal, lungs clear to auscultation but diminished   CV:  Palpation and auscultation of heart done , regular rate and rhythm, no murmur, rub, or gallop, trace edema, pacer  ABDOMEN: BS hypoactive,  Abdominal area is rounded  and distended, slightly firm  M/S:   Gait and station at baseline-ambulates w/o assistive device-mostly steady    SKIN:  Inspection of skin and subcutaneous tissue-baseline to visualized areas  NEURO:   Cranial nerves 2-12 are normal tested and grossly at patient's baseline  PSYCH:  insight and judgement impaired, memory impaired     Lab/Diagnostic data:   CBC RESULTS: Recent Labs   Lab Test 06/17/22 0934 01/27/22  0905   WBC 6.3 5.3   RBC 3.89 3.84   HGB 12.5 12.6   HCT 39.0 42.3    110*   MCH 32.1 32.8   MCHC 32.1 29.8*   RDW 14.0 13.7    184       Last Basic Metabolic Panel:  Recent Labs   Lab Test 06/17/22 0934 01/27/22  0905    141   POTASSIUM 4.1 3.9   CHLORIDE 110* 111*   AMERICO 9.0 9.1   CO2 " 27 25   BUN 22 17   CR 1.11* 0.86   GLC 96 56*       Liver Function Studies -   Recent Labs   Lab Test 06/17/22  0934 10/21/21  0708   PROTTOTAL 6.9 6.1*   ALBUMIN 3.8 3.2*   BILITOTAL 0.5 0.4   ALKPHOS 73 63   AST 20 15   ALT 16 12       TSH   Date Value Ref Range Status   07/28/2022 2.49 0.40 - 4.00 mU/L Final   10/21/2021 3.99 0.40 - 4.00 mU/L Final   12/23/2020 4.80 (H) 0.40 - 4.00 mU/L Final   11/25/2020 8.30 (H) 0.40 - 4.00 mU/L Final       No results found for: A1C    ASSESSMENT/PLAN  (K59.01) Slow transit constipation  (primary encounter diagnosis)  (K56.49) Impaction of the bowels (H)  (R14.0) Abdominal distention  (F41.9) Anxiety  Comment: chronic-previous bladder scan negative for urine retention- Ativan prn for enema administration has been helpful. Could use adaptive clothing with hx of bowel movements in odd places   Plan:  -Enema daily prn AND weekly  -supp daily prn and q3 days if no BM   -lactulose 30mL daily   -continue Senna S 2 tabs po BID   -Miralax to BID  -ativan prior to administration of enema . Increased to 1 mg on 10/13    (I10) Benign essential hypertension  (I48.20) Chronic atrial fibrillation (H)  (Z95.0) Cardiac pacemaker in situ  Comment: stable   Plan:  -metoprolol 25 mg po BID  -eliquis 2.5 mg po BID  -BMP periodically     (E03.9) Acquired hypothyroidism  Comment: stable   Plan:   -TSH annually  -levothyroxine 88 mcg po daily     (F03.918) Dementia with behavioral disturbance  (G47.00) Insomnia, unspecified type  Comment: ongoing  Plan:   -continue current plan of care        Electronically signed by:  ZAFAR Bae CNP             Sincerely,        ZAFAR Bae CNP

## 2023-01-01 ENCOUNTER — TELEPHONE (OUTPATIENT)
Dept: GERIATRICS | Facility: CLINIC | Age: 88
End: 2023-01-01
Payer: MEDICARE

## 2023-01-01 ENCOUNTER — ASSISTED LIVING VISIT (OUTPATIENT)
Dept: GERIATRICS | Facility: CLINIC | Age: 88
End: 2023-01-01
Payer: MEDICARE

## 2023-01-01 ENCOUNTER — DOCUMENTATION ONLY (OUTPATIENT)
Dept: GERIATRICS | Facility: CLINIC | Age: 88
End: 2023-01-01
Payer: MEDICARE

## 2023-01-01 VITALS
HEIGHT: 68 IN | TEMPERATURE: 97.4 F | BODY MASS INDEX: 17.46 KG/M2 | RESPIRATION RATE: 18 BRPM | DIASTOLIC BLOOD PRESSURE: 126 MMHG | WEIGHT: 115.2 LBS | OXYGEN SATURATION: 94 % | HEART RATE: 97 BPM | SYSTOLIC BLOOD PRESSURE: 178 MMHG

## 2023-01-01 VITALS
BODY MASS INDEX: 17.46 KG/M2 | RESPIRATION RATE: 18 BRPM | HEIGHT: 68 IN | DIASTOLIC BLOOD PRESSURE: 55 MMHG | SYSTOLIC BLOOD PRESSURE: 132 MMHG | HEART RATE: 70 BPM | TEMPERATURE: 97.5 F | WEIGHT: 115.2 LBS | OXYGEN SATURATION: 95 %

## 2023-01-01 DIAGNOSIS — F03.918 DEMENTIA WITH BEHAVIORAL DISTURBANCE (H): Primary | ICD-10-CM

## 2023-01-01 DIAGNOSIS — F41.9 ANXIETY: ICD-10-CM

## 2023-01-01 DIAGNOSIS — I10 BENIGN ESSENTIAL HYPERTENSION: ICD-10-CM

## 2023-01-01 DIAGNOSIS — N18.31 STAGE 3A CHRONIC KIDNEY DISEASE (H): ICD-10-CM

## 2023-01-01 DIAGNOSIS — I48.20 CHRONIC ATRIAL FIBRILLATION (H): ICD-10-CM

## 2023-01-01 DIAGNOSIS — R45.1 AGITATION: ICD-10-CM

## 2023-01-01 DIAGNOSIS — E46 PROTEIN-CALORIE MALNUTRITION, UNSPECIFIED SEVERITY (H): ICD-10-CM

## 2023-01-01 DIAGNOSIS — K59.01 SLOW TRANSIT CONSTIPATION: ICD-10-CM

## 2023-01-01 DIAGNOSIS — Z51.5 HOSPICE CARE PATIENT: ICD-10-CM

## 2023-01-01 DIAGNOSIS — R52 GENERALIZED PAIN: ICD-10-CM

## 2023-01-01 DIAGNOSIS — F03.918 DEMENTIA WITH BEHAVIORAL DISTURBANCE (H): ICD-10-CM

## 2023-01-01 DIAGNOSIS — F33.1 MODERATE EPISODE OF RECURRENT MAJOR DEPRESSIVE DISORDER (H): ICD-10-CM

## 2023-01-01 LAB
ALBUMIN SERPL-MCNC: 3.6 G/DL (ref 3.4–5)
ALP SERPL-CCNC: 65 U/L (ref 40–150)
ALT SERPL W P-5'-P-CCNC: 14 U/L (ref 0–50)
ANION GAP SERPL CALCULATED.3IONS-SCNC: 8 MMOL/L (ref 3–14)
AST SERPL W P-5'-P-CCNC: 18 U/L (ref 0–45)
BILIRUB DIRECT SERPL-MCNC: 0.1 MG/DL (ref 0–0.2)
BILIRUB SERPL-MCNC: 0.5 MG/DL (ref 0.2–1.3)
BUN SERPL-MCNC: 28 MG/DL (ref 7–30)
CALCIUM SERPL-MCNC: 9 MG/DL (ref 8.5–10.1)
CHLORIDE BLD-SCNC: 111 MMOL/L (ref 94–109)
CO2 SERPL-SCNC: 21 MMOL/L (ref 20–32)
CREAT SERPL-MCNC: 1.13 MG/DL (ref 0.52–1.04)
ERYTHROCYTE [DISTWIDTH] IN BLOOD BY AUTOMATED COUNT: 13.2 % (ref 10–15)
GFR SERPL CREATININE-BSD FRML MDRD: 47 ML/MIN/1.73M2
GLUCOSE BLD-MCNC: 135 MG/DL (ref 70–99)
HCT VFR BLD AUTO: 35.3 % (ref 35–47)
HGB BLD-MCNC: 11.4 G/DL (ref 11.7–15.7)
MCH RBC QN AUTO: 32.5 PG (ref 26.5–33)
MCHC RBC AUTO-ENTMCNC: 32.3 G/DL (ref 31.5–36.5)
MCV RBC AUTO: 101 FL (ref 78–100)
PLATELET # BLD AUTO: 234 10E3/UL (ref 150–450)
POTASSIUM BLD-SCNC: 4 MMOL/L (ref 3.4–5.3)
PROT SERPL-MCNC: 6.7 G/DL (ref 6.8–8.8)
RBC # BLD AUTO: 3.51 10E6/UL (ref 3.8–5.2)
SODIUM SERPL-SCNC: 140 MMOL/L (ref 133–144)
TSH SERPL DL<=0.005 MIU/L-ACNC: 3.08 MU/L (ref 0.4–4)
WBC # BLD AUTO: 6.9 10E3/UL (ref 4–11)

## 2023-01-01 PROCEDURE — 82248 BILIRUBIN DIRECT: CPT | Mod: ORL | Performed by: NURSE PRACTITIONER

## 2023-01-01 PROCEDURE — 99349 HOME/RES VST EST MOD MDM 40: CPT | Mod: GV | Performed by: NURSE PRACTITIONER

## 2023-01-01 PROCEDURE — P9604 ONE-WAY ALLOW PRORATED TRIP: HCPCS | Mod: ORL | Performed by: NURSE PRACTITIONER

## 2023-01-01 PROCEDURE — 84443 ASSAY THYROID STIM HORMONE: CPT | Mod: ORL | Performed by: NURSE PRACTITIONER

## 2023-01-01 PROCEDURE — 80053 COMPREHEN METABOLIC PANEL: CPT | Mod: ORL | Performed by: NURSE PRACTITIONER

## 2023-01-01 PROCEDURE — 36415 COLL VENOUS BLD VENIPUNCTURE: CPT | Mod: ORL | Performed by: NURSE PRACTITIONER

## 2023-01-01 PROCEDURE — 85027 COMPLETE CBC AUTOMATED: CPT | Mod: ORL | Performed by: NURSE PRACTITIONER

## 2023-01-01 RX ORDER — QUETIAPINE FUMARATE 50 MG/1
50 TABLET, FILM COATED ORAL 3 TIMES DAILY
Qty: 90 TABLET | Refills: 3 | Status: SHIPPED | OUTPATIENT
Start: 2023-01-01 | End: 2023-01-01

## 2023-01-01 RX ORDER — GABAPENTIN 100 MG/1
100 CAPSULE ORAL 2 TIMES DAILY
COMMUNITY
Start: 2023-01-01

## 2023-01-01 RX ORDER — HYOSCYAMINE SULFATE 0.125 MG
0.12 TABLET ORAL EVERY 4 HOURS PRN
COMMUNITY
Start: 2023-01-01

## 2023-01-01 RX ORDER — PROCHLORPERAZINE MALEATE 10 MG
10 TABLET ORAL EVERY 6 HOURS PRN
COMMUNITY
Start: 2023-01-01

## 2023-01-01 RX ORDER — LORAZEPAM 0.5 MG/1
0.5 TABLET ORAL 4 TIMES DAILY
Qty: 90 TABLET | Refills: 3 | Status: SHIPPED | OUTPATIENT
Start: 2023-01-01

## 2023-01-01 RX ORDER — ACETAMINOPHEN 500 MG
TABLET ORAL
Qty: 186 TABLET | Refills: 97 | Status: SHIPPED | OUTPATIENT
Start: 2023-01-01

## 2023-01-01 RX ORDER — LORAZEPAM 0.5 MG/1
0.5 TABLET ORAL 3 TIMES DAILY
Qty: 90 TABLET | Refills: 3 | Status: SHIPPED | OUTPATIENT
Start: 2023-01-01 | End: 2023-01-01

## 2023-01-01 RX ORDER — BISACODYL 10 MG
SUPPOSITORY, RECTAL RECTAL
Qty: 12 SUPPOSITORY | Refills: 11 | Status: SHIPPED | OUTPATIENT
Start: 2023-01-01

## 2023-01-01 RX ORDER — LORAZEPAM 0.5 MG/1
TABLET ORAL
Qty: 90 TABLET | Refills: 3 | Status: SHIPPED | OUTPATIENT
Start: 2023-01-01 | End: 2023-01-01

## 2023-01-01 RX ORDER — MORPHINE SULFATE 30 MG/1
2.5 TABLET ORAL
COMMUNITY
Start: 2023-01-01

## 2023-01-01 RX ORDER — QUETIAPINE FUMARATE 50 MG/1
75 TABLET, FILM COATED ORAL 2 TIMES DAILY
Qty: 90 TABLET | Refills: 3 | Status: SHIPPED | OUTPATIENT
Start: 2023-01-01

## 2023-01-01 RX ORDER — QUETIAPINE FUMARATE 25 MG/1
TABLET, FILM COATED ORAL
Qty: 126 TABLET | Refills: 97 | Status: SHIPPED | OUTPATIENT
Start: 2023-01-01 | End: 2023-01-01

## 2023-01-01 RX ORDER — HALOPERIDOL 1 MG/1
1 TABLET ORAL EVERY 4 HOURS PRN
COMMUNITY
Start: 2023-01-01

## 2023-01-13 NOTE — PROGRESS NOTES
"Continues with crying throughout the day. Ativan prior to enema is effective in allowing staff to administer and calm her down. Her chronic bowel constipation is best relieved now with this per nursing but overall continuously upset. She does have some falls but staff reporting related to the fact \"she can't see\" and is misjudging distance of a chair not sedation.    Plan:  1. Ativan 0.5 mg po BID and Ativan 1 mg po daily prn on days prior to enema    Reese Morfin, ZAFAR CNP on 1/13/2023 at 5:15 PM    "

## 2023-02-01 NOTE — PROGRESS NOTES
Increased agitation noticed from staff and NP. Ativan has been helpful. She is loosing weight, nonsensical, ongoing acute on chronic bowel impaction.    Orders:  1. Increase Ativan to 0.5 mg po TID  2. Ok to eval for hospice     ZAFAR Bae CNP on 2/1/2023 at 5:32 PM

## 2023-02-06 NOTE — TELEPHONE ENCOUNTER
[facility] NON-EMER[facility] FAX ENCOUNTER    Annabel May is a 88 year old  (5/12/1934), Voicemail Message received today regarding:           Electronically signed by:   Jadyn Gates RN

## 2023-02-08 NOTE — PROGRESS NOTES
[facility]  [facility] Record Number:  [id number]  Place of Service where encounter took place:  [facility] [[id number]]  Chief Complaint   Patient presents with     RECHECK     Newly on hospice       HPI:    Annabel May  is a 88 year old (5/12/1934), who is being seen today for an episodic care visit.  HPI information obtained from: facility chart records, [facility] chart review and hospice. Today's concern is:    Seen today for chronic disease management. Pill load was also reduced at previous visit as hardship and resistant to take medications. She is up wandering around. Tearful and nonsensical with advanced dementia. Doesn't always know her family per son Kelvin. Has a worried look on her face which is not new. Now using increase dose of prn ativan prior to administering suppository as she is combative with this but unable to move bowel without assistance. PMH of impaction of bowels, abdominal distension, redundant colon.  Staff reporting that weekly enema has been helping to empty her bowels and improve her comfort. Lactulose 20g po daily started 8/24 now on 30g daily. Had improved results with MOM and prune juice but cautioned to discontinue MOM from pharmacy med review with hx of CKD.  Facility chart review and bowel movements every 1-3 days. Under hospice care and asked to increase medication for agitation, anxiety. She is in the common area crying, nonsensical and agitated. She is ripping up a napkin and is hard to redirect. Frequently episodes of yelling, crying.     Past Medical and Surgical History reviewed in Epic today.    MEDICATIONS:    Current Outpatient Medications   Medication Sig Dispense Refill     acetaminophen (ACETAMINOPHEN EXTRA STRENGTH) 500 MG tablet TAKE TWO TABLETS (1000MG) BY MOUTH TWICE DAILY;& TAKE (1000MG)  BY MOUTH DAILY AS NEEDED FOR PAIN 186 tablet 97     bisacodyl (DULCOLAX) 10 MG suppository UNWRAP AND INSERT ONE  "SUPPOSITORY RECTALLY EVERY THREE DAYS AND DAILY AS NEEDED FOR CONSTIPATION 10 suppository 97     ELIQUIS ANTICOAGULANT 2.5 MG tablet TAKE 1 TABLET BY MOUTH TWICE DAILY 56 tablet 97     lactulose 20 GM/30ML SOLN Take 30 mLs (20 g) by mouth daily 473 mL 11     latanoprost (XALATAN) 0.005 % ophthalmic solution INSTILL ONE DROP IN EACH EYE AT BEDTIME 2.5 mL 97     levothyroxine (SYNTHROID/LEVOTHROID) 88 MCG tablet TAKE 1 TABLET BY MOUTH ONCE DAILY 90 tablet 97     LORazepam (ATIVAN) 0.5 MG tablet Take 1 tablet (0.5 mg) by mouth 3 times daily 90 tablet 3     MELATONIN MAXIMUM STRENGTH 5 MG tablet TAKE 1 TABLET BY MOUTH AT BEDTIME 28 tablet 97     metoprolol tartrate (LOPRESSOR) 25 MG tablet TAKE ONE TABLET (25MG) BY MOUTH TWICE DAILY 180 tablet 97     mirtazapine (REMERON) 15 MG tablet TAKE 1 TABLET BY MOUTH AT BEDTIME  90 tablet 3     polyethylene glycol (MIRALAX) 17 GM/Dose powder MIX 17GM OF POWDER IN 8OZ OF WATER UNTIL COMPLETELY DISSOLVED. DRINK SOLUTION TWICE DAILY 510 g 97     QUEtiapine (SEROQUEL) 25 MG tablet TAKE ONE AND ONE-HALF TABLETS (37.5MG) BY MOUTH THREE TIMES DAILY;& TAKE 1 TABLET BY MOUTH TWICE DAILY AS NEEDED 126 tablet 97     senna-docusate (SENEXON-S) 8.6-50 MG tablet TAKE TWO TABLETS BY MOUTH TWICE DAILY. DO NOT HOLD FOR LOOSE STOOL AND UPDATE PROVIDER FOR POSSIBLEBOWEL IMPACTION. 112 tablet 97     sertraline (ZOLOFT) 25 MG tablet Take 1 tablet (25 mg) by mouth daily 25 mg po daily x 14 days then increase to 50 mg po daily 30 tablet 11     Sodium Phosphates (ENEMA) 7-19 GM/118ML ENEM ONCE WEEKLY AND DAILY AS NEEDED FOR STOOL IMPACTION 399 mL PRN     traZODone (DESYREL) 50 MG tablet TAKE 1 TABLET BY MOUTH AT BEDTIME 90 tablet 97     REVIEW OF SYSTEMS:  Unobtainable secondary to cognitive impairment.     Objective:  /55   Pulse 70   Temp 97.5  F (36.4  C)   Resp 18   Ht 1.727 m (5' 8\")   Wt 52.3 kg (115 lb 3.2 oz)   SpO2 95%   BMI 17.52 kg/m    Exam:  GENERAL APPEARANCE:  Alert, "  anxious, crying and upset  ENT:  Mouth and posterior oropharynx normal, dry mucous membranes, normal hearing acuity  EYES: lids, pupils and irises normal  RESP:  respiratory effort and palpation of chest normal, lungs clear to auscultation but diminished   CV:  Palpation and auscultation of heart done , regular rate and rhythm, no murmur, rub, or gallop, trace edema, pacer  ABDOMEN: BS hypoactive,  Abdominal area is rounded  and distended, slightly firm at baseline   M/S:   Gait and station at baseline-ambulates w/o assistive device-mostly steady    SKIN:  Inspection of skin and subcutaneous tissue-baseline to visualized areas  NEURO:   Cranial nerves 2-12 are normal tested and grossly at patient's baseline  PSYCH:  insight and judgement impaired, memory impaired     Labs:   CBC RESULTS: Recent Labs   Lab Test 01/05/23  1023 06/17/22  0934   WBC 6.9 6.3   RBC 3.51* 3.89   HGB 11.4* 12.5   HCT 35.3 39.0   * 100   MCH 32.5 32.1   MCHC 32.3 32.1   RDW 13.2 14.0    174       Last Basic Metabolic Panel:  Recent Labs   Lab Test 01/05/23  1023 06/17/22  0934    143   POTASSIUM 4.0 4.1   CHLORIDE 111* 110*   AMERICO 9.0 9.0   CO2 21 27   BUN 28 22   CR 1.13* 1.11*   * 96       Liver Function Studies -   Recent Labs   Lab Test 01/05/23  1023 06/17/22  0934   PROTTOTAL 6.7* 6.9   ALBUMIN 3.6 3.8   BILITOTAL 0.5 0.5   ALKPHOS 65 73   AST 18 20   ALT 14 16       TSH   Date Value Ref Range Status   01/05/2023 3.08 0.40 - 4.00 mU/L Final   07/28/2022 2.49 0.40 - 4.00 mU/L Final   12/23/2020 4.80 (H) 0.40 - 4.00 mU/L Final   11/25/2020 8.30 (H) 0.40 - 4.00 mU/L Final       No results found for: A1C    ASSESSMENT/PLAN:  (F03.918) Dementia with behavioral disturbance  (primary encounter diagnosis)  (F33.1) Moderate episode of recurrent major depressive disorder (H)  (F41.9) Anxiety  (R45.1) Agitation  Comment: ongoing behaviors- no longer recognizes her son. Asking for her mother and father  Plan:    -Escitalopram 10 mg po daily discontinued as not effective   -Sertraline 50 mg po daily   -Seroquel to 37.5 mg po TID and 25 mg po BID prn to 50 mg po TID  -remind staff that matching her tone escalates her behavior      (E46) Protein-calorie malnutrition, unspecified severity (H)  Comment: weights variable in chart review but with ongoing loss  Plan:   -facility continue to monitor   -mirtazapine 15 mg po daily at HS     (I48.20) Chronic atrial fibrillation (H)  (N18.31) Stage 3a chronic kidney disease (H)  (I10) Benign essential hypertension  Comment: ongoing   Plan:   -Lisinopril 2.5 mg po daily at HS discontinued with BP WNL and need to reduce pill load  -eliquis 2.5 mg po BID   -metoprolol 12.5 mg po BID      (K59.01) Slow transit constipation  Comment: hx of bowel impaction, ongoing soft stool 2-3 daily  Plan:  -continue current plan of care       Electronically signed by:  ZAFAR Bae CNP

## 2023-02-08 NOTE — LETTER
2/8/2023        RE: Annabel May  C/o Annabel May  [address] [facility] [address]        [facility]  [place] Medical Record Number:  [id number]  Place of Service where encounter took place:  [facility] [[id number]]  Chief Complaint   Patient presents with     RECHECK     Newly on hospice       HPI:    Annabel May  is a 88 year old (5/12/1934), who is being seen today for an episodic care visit.  HPI information obtained from: facility chart records, [facility] chart review and hospice. Today's concern is:    Seen today for chronic disease management. Pill load was also reduced at previous visit as hardship and resistant to take medications. She is up wandering around. Tearful and nonsensical with advanced dementia. Doesn't always know her family per son Kelvin. Has a worried look on her face which is not new. Now using increase dose of prn ativan prior to administering suppository as she is combative with this but unable to move bowel without assistance. PMH of impaction of bowels, abdominal distension, redundant colon.  Staff reporting that weekly enema has been helping to empty her bowels and improve her comfort. Lactulose 20g po daily started 8/24 now on 30g daily. Had improved results with MOM and prune juice but cautioned to discontinue MOM from pharmacy med review with hx of CKD.  Facility chart review and bowel movements every 1-3 days. Under hospice care and asked to increase medication for agitation, anxiety. She is in the common area crying, nonsensical and agitated. She is ripping up a napkin and is hard to redirect. Frequently episodes of yelling, crying.     Past Medical and Surgical History reviewed in Epic today.    MEDICATIONS:    Current Outpatient Medications   Medication Sig Dispense Refill     acetaminophen (ACETAMINOPHEN EXTRA STRENGTH) 500 MG tablet TAKE TWO TABLETS (1000MG) BY MOUTH TWICE DAILY;& TAKE (1000MG)  BY MOUTH  "DAILY AS NEEDED FOR PAIN 186 tablet 97     bisacodyl (DULCOLAX) 10 MG suppository UNWRAP AND INSERT ONE SUPPOSITORY RECTALLY EVERY THREE DAYS AND DAILY AS NEEDED FOR CONSTIPATION 10 suppository 97     ELIQUIS ANTICOAGULANT 2.5 MG tablet TAKE 1 TABLET BY MOUTH TWICE DAILY 56 tablet 97     lactulose 20 GM/30ML SOLN Take 30 mLs (20 g) by mouth daily 473 mL 11     latanoprost (XALATAN) 0.005 % ophthalmic solution INSTILL ONE DROP IN EACH EYE AT BEDTIME 2.5 mL 97     levothyroxine (SYNTHROID/LEVOTHROID) 88 MCG tablet TAKE 1 TABLET BY MOUTH ONCE DAILY 90 tablet 97     LORazepam (ATIVAN) 0.5 MG tablet Take 1 tablet (0.5 mg) by mouth 3 times daily 90 tablet 3     MELATONIN MAXIMUM STRENGTH 5 MG tablet TAKE 1 TABLET BY MOUTH AT BEDTIME 28 tablet 97     metoprolol tartrate (LOPRESSOR) 25 MG tablet TAKE ONE TABLET (25MG) BY MOUTH TWICE DAILY 180 tablet 97     mirtazapine (REMERON) 15 MG tablet TAKE 1 TABLET BY MOUTH AT BEDTIME  90 tablet 3     polyethylene glycol (MIRALAX) 17 GM/Dose powder MIX 17GM OF POWDER IN 8OZ OF WATER UNTIL COMPLETELY DISSOLVED. DRINK SOLUTION TWICE DAILY 510 g 97     QUEtiapine (SEROQUEL) 25 MG tablet TAKE ONE AND ONE-HALF TABLETS (37.5MG) BY MOUTH THREE TIMES DAILY;& TAKE 1 TABLET BY MOUTH TWICE DAILY AS NEEDED 126 tablet 97     senna-docusate (SENEXON-S) 8.6-50 MG tablet TAKE TWO TABLETS BY MOUTH TWICE DAILY. DO NOT HOLD FOR LOOSE STOOL AND UPDATE PROVIDER FOR POSSIBLEBOWEL IMPACTION. 112 tablet 97     sertraline (ZOLOFT) 25 MG tablet Take 1 tablet (25 mg) by mouth daily 25 mg po daily x 14 days then increase to 50 mg po daily 30 tablet 11     Sodium Phosphates (ENEMA) 7-19 GM/118ML ENEM ONCE WEEKLY AND DAILY AS NEEDED FOR STOOL IMPACTION 399 mL PRN     traZODone (DESYREL) 50 MG tablet TAKE 1 TABLET BY MOUTH AT BEDTIME 90 tablet 97     REVIEW OF SYSTEMS:  Unobtainable secondary to cognitive impairment.     Objective:  /55   Pulse 70   Temp 97.5  F (36.4  C)   Resp 18   Ht 1.727 m (5' 8\")   " Wt 52.3 kg (115 lb 3.2 oz)   SpO2 95%   BMI 17.52 kg/m    Exam:  GENERAL APPEARANCE:  Alert,  anxious, crying and upset  ENT:  Mouth and posterior oropharynx normal, dry mucous membranes, normal hearing acuity  EYES: lids, pupils and irises normal  RESP:  respiratory effort and palpation of chest normal, lungs clear to auscultation but diminished   CV:  Palpation and auscultation of heart done , regular rate and rhythm, no murmur, rub, or gallop, trace edema, pacer  ABDOMEN: BS hypoactive,  Abdominal area is rounded  and distended, slightly firm at baseline   M/S:   Gait and station at baseline-ambulates w/o assistive device-mostly steady    SKIN:  Inspection of skin and subcutaneous tissue-baseline to visualized areas  NEURO:   Cranial nerves 2-12 are normal tested and grossly at patient's baseline  PSYCH:  insight and judgement impaired, memory impaired     Labs:   CBC RESULTS: Recent Labs   Lab Test 01/05/23  1023 06/17/22  0934   WBC 6.9 6.3   RBC 3.51* 3.89   HGB 11.4* 12.5   HCT 35.3 39.0   * 100   MCH 32.5 32.1   MCHC 32.3 32.1   RDW 13.2 14.0    174       Last Basic Metabolic Panel:  Recent Labs   Lab Test 01/05/23  1023 06/17/22  0934    143   POTASSIUM 4.0 4.1   CHLORIDE 111* 110*   AMERICO 9.0 9.0   CO2 21 27   BUN 28 22   CR 1.13* 1.11*   * 96       Liver Function Studies -   Recent Labs   Lab Test 01/05/23  1023 06/17/22  0934   PROTTOTAL 6.7* 6.9   ALBUMIN 3.6 3.8   BILITOTAL 0.5 0.5   ALKPHOS 65 73   AST 18 20   ALT 14 16       TSH   Date Value Ref Range Status   01/05/2023 3.08 0.40 - 4.00 mU/L Final   07/28/2022 2.49 0.40 - 4.00 mU/L Final   12/23/2020 4.80 (H) 0.40 - 4.00 mU/L Final   11/25/2020 8.30 (H) 0.40 - 4.00 mU/L Final       No results found for: A1C    ASSESSMENT/PLAN:  (F03.918) Dementia with behavioral disturbance  (primary encounter diagnosis)  (F33.1) Moderate episode of recurrent major depressive disorder (H)  (F41.9) Anxiety  (R45.1) Agitation  Comment:  ongoing behaviors- no longer recognizes her son. Asking for her mother and father  Plan:   -Escitalopram 10 mg po daily discontinued as not effective   -Sertraline 50 mg po daily   -Seroquel to 37.5 mg po TID and 25 mg po BID prn to 50 mg po TID  -remind staff that matching her tone escalates her behavior      (E46) Protein-calorie malnutrition, unspecified severity (H)  Comment: weights variable in chart review but with ongoing loss  Plan:   -facility continue to monitor   -mirtazapine 15 mg po daily at HS     (I48.20) Chronic atrial fibrillation (H)  (N18.31) Stage 3a chronic kidney disease (H)  (I10) Benign essential hypertension  Comment: ongoing   Plan:   -Lisinopril 2.5 mg po daily at HS discontinued with BP WNL and need to reduce pill load  -eliquis 2.5 mg po BID   -metoprolol 12.5 mg po BID      (K59.01) Slow transit constipation  Comment: hx of bowel impaction, ongoing soft stool 2-3 daily  Plan:  -continue current plan of care       Electronically signed by:  ZAFAR Bae CNP                 Sincerely,        ZAFAR Bae CNP

## 2023-02-08 NOTE — LETTER
Annabel May orders    1. Discontinue current orders and begin LORazepam (ATIVAN) 0.5 MG tablet  Take 1 tablet (0.5 mg) by mouth 4 times daily And once daily as needed     2. Increase to Seroquel 50 mg po TID. Discontinue previous order    3.  Reduce to    acetaminophen (ACETAMINOPHEN EXTRA STRENGTH) 500 MG tablet TAKE TWO TABLETS (1000MG) BY MOUTH TWICE DAILY AS NEEDED FOR PAIN       4. Discontinue melatonin      ZAFAR Bae CNP on 2/9/2023 at 7:30 AM

## 2023-02-09 PROBLEM — F03.90 DEMENTIA (H): Status: RESOLVED | Noted: 2020-01-09 | Resolved: 2023-01-01

## 2023-02-13 NOTE — TELEPHONE ENCOUNTER
[facility] FAX ENCOUNTER    Annabel May is a 88 year old  (5/12/1934), FAX Message received today regarding:           Electronically signed by:   Jadyn Gates RN

## 2023-02-22 NOTE — LETTER
Annabel May orders    1. Restart Mirtazapine 15 mg po daily at HS  2. Reduce Sertraline to 75 mg po daily every morning  3. Discontinue current orders for Seroquel and begin Seroquel 75 mg po daily every afternoon and evening     ZAFAR Bae CNP on 2/22/2023 at 1:26 PM

## 2023-02-22 NOTE — PROGRESS NOTES
"[facility]  [place] Medical Record Number:  [id number]  Place of Service where encounter took place:  [facility] [[id number]]  Chief Complaint   Patient presents with     RECHECK     Home Care/Hospice       HPI:    Annabel May  is a 88 year old (5/12/1934), who is being seen today for an episodic care visit.  HPI information obtained from: facility chart records, facility staff and [facility] chart review. Today's concern is:    Seen today for follow up to ongoing agitation, restlessness. She is in a broda chair, is crying and upset which unfortunately is not new. She is nonsensical, continues to say \"my son, my veronica\" and \"I want my mommy\". She looks thin, cachectic. Notes says poor oral intake since last weekend. Mirtazapine was discontinued from hospice and nursing would like it restarted. Thinks it helped with overnight restlessness.  Bowel movement today.    Past Medical and Surgical History reviewed in Epic today.    MEDICATIONS:    Current Outpatient Medications   Medication Sig Dispense Refill     QUEtiapine (SEROQUEL) 50 MG tablet Take 1.5 tablets (75 mg) by mouth 2 times daily 90 tablet 3     sertraline (ZOLOFT) 50 MG tablet Take 1.5 tablets (75 mg) by mouth daily 30 tablet 3     acetaminophen (ACETAMINOPHEN EXTRA STRENGTH) 500 MG tablet TAKE TWO TABLETS (1000MG) BY MOUTH TWICE DAILY AS NEEDED FOR PAIN 186 tablet 97     bisacodyl (DULCOLAX) 10 MG suppository UNWRAP AND INSERT ONE SUPPOSITORY RECTALLY EVERY 3 DAYS AND DAILY AS NEEDED FOR CONSTIPATION 12 suppository 11     hyoscyamine (LEVSIN) 0.125 MG tablet Take 1 tablet (125 mcg) by mouth every 4 hours as needed for cramping       lactulose 20 GM/30ML SOLN Take 30 mLs (20 g) by mouth daily 473 mL 11     latanoprost (XALATAN) 0.005 % ophthalmic solution INSTILL ONE DROP IN EACH EYE AT BEDTIME 2.5 mL 97     levothyroxine (SYNTHROID/LEVOTHROID) 88 MCG tablet TAKE 1 TABLET BY MOUTH ONCE DAILY 90 tablet " "97     LORazepam (ATIVAN) 0.5 MG tablet Take 1 tablet (0.5 mg) by mouth 4 times daily And once daily as needed 90 tablet 3     metoprolol tartrate (LOPRESSOR) 25 MG tablet TAKE ONE TABLET (25MG) BY MOUTH TWICE DAILY 180 tablet 97     mirtazapine (REMERON) 15 MG tablet TAKE 1 TABLET BY MOUTH AT BEDTIME  90 tablet 3     morphine 2.5 MG solu-tab Take 1 tablet (2.5 mg) by mouth every hour as needed for shortness of breath or moderate to severe pain       polyethylene glycol (MIRALAX) 17 GM/Dose powder MIX 17GM OF POWDER IN 8OZ OF WATER UNTIL COMPLETELY DISSOLVED. DRINK SOLUTION TWICE DAILY 510 g 97     prochlorperazine (COMPAZINE) 10 MG tablet Take 1 tablet (10 mg) by mouth every 6 hours as needed for nausea or vomiting       senna-docusate (SENEXON-S) 8.6-50 MG tablet TAKE TWO TABLETS BY MOUTH TWICE DAILY. DO NOT HOLD FOR LOOSE STOOL AND UPDATE PROVIDER FOR POSSIBLEBOWEL IMPACTION. 112 tablet 97     Sodium Phosphates (ENEMA) 7-19 GM/118ML ENEM ONCE WEEKLY AND DAILY AS NEEDED FOR STOOL IMPACTION 399 mL PRN     traZODone (DESYREL) 50 MG tablet TAKE 1 TABLET BY MOUTH AT BEDTIME 90 tablet 97     REVIEW OF SYSTEMS:  Unobtainable secondary to cognitive impairment.     Objective:  BP (!) 178/126   Pulse 97   Temp 97.4  F (36.3  C)   Resp 18   Ht 1.727 m (5' 8\")   Wt 52.3 kg (115 lb 3.2 oz)   SpO2 94%   BMI 17.52 kg/m    Exam:  GENERAL APPEARANCE:  Alert,  anxious, crying and upset, cachetic   ENT:  Mouth and posterior oropharynx normal, dry mucous membranes, normal hearing acuity  EYES: lids, pupils and irises normal  RESP:  respiratory effort and palpation of chest normal, lungs clear to auscultation but diminished   CV:  Palpation and auscultation of heart done , regular rate and rhythm, no murmur, rub, or gallop, trace edema, pacer  ABDOMEN: BS,  Abdominal area is rounded  and distended, slightly firm at baseline   M/S: Broda chair   SKIN:  Inspection of skin and subcutaneous tissue-baseline to visualized " areas  NEURO:   Cranial nerves 2-12 are normal tested and grossly at patient's baseline  PSYCH:  insight and judgement impaired, memory impaired     Labs:   CBC RESULTS: Recent Labs   Lab Test 01/05/23  1023 06/17/22  0934   WBC 6.9 6.3   RBC 3.51* 3.89   HGB 11.4* 12.5   HCT 35.3 39.0   * 100   MCH 32.5 32.1   MCHC 32.3 32.1   RDW 13.2 14.0    174       Last Basic Metabolic Panel:  Recent Labs   Lab Test 01/05/23  1023 06/17/22  0934    143   POTASSIUM 4.0 4.1   CHLORIDE 111* 110*   AMERICO 9.0 9.0   CO2 21 27   BUN 28 22   CR 1.13* 1.11*   * 96       Liver Function Studies -   Recent Labs   Lab Test 01/05/23  1023 06/17/22  0934   PROTTOTAL 6.7* 6.9   ALBUMIN 3.6 3.8   BILITOTAL 0.5 0.5   ALKPHOS 65 73   AST 18 20   ALT 14 16       TSH   Date Value Ref Range Status   01/05/2023 3.08 0.40 - 4.00 mU/L Final   07/28/2022 2.49 0.40 - 4.00 mU/L Final   12/23/2020 4.80 (H) 0.40 - 4.00 mU/L Final   11/25/2020 8.30 (H) 0.40 - 4.00 mU/L Final       No results found for: A1C    ASSESSMENT/PLAN:  (F03.918) Dementia with behavioral disturbance  (primary encounter diagnosis)  (F41.9) Anxiety  (R45.1) Agitation  (Z51.5) Hospice care patient  Comment: ongoing anxiety with agitation-unable to obtain updated VS today secondary to agitation.   Plan:   1. Restart Mirtazapine 15 mg po daily at HS  2. Reduce Sertraline to 75 mg po daily every morning  3. Discontinue current orders for Seroquel and begin Seroquel 75 mg po daily every afternoon and evening   4. Continue with additional scheduled and  prn medication from hospice for anxiety    Electronically signed by:  Reese Morfin, ZAFAR QUINTERO

## 2023-02-22 NOTE — LETTER
"    2/22/2023        RE: Annabel May  C/o Annabel May  [address] [facility] [facility] [address]        [facility] [facility]  [place] Medical Record Number:  [id number]  Place of Service where encounter took place:  [facility] [[id number]]  Chief Complaint   Patient presents with     RECHECK     Home Care/Hospice       HPI:    Annabel May  is a 88 year old (5/12/1934), who is being seen today for an episodic care visit.  HPI information obtained from: facility chart records, facility staff and [facility] chart review. Today's concern is:    Seen today for follow up to ongoing agitation, restlessness. She is in a broda chair, is crying and upset which unfortunately is not new. She is nonsensical, continues to say \"my son, my veronica\" and \"I want my mommy\". She looks thin, cachectic. Notes says poor oral intake since last weekend. Mirtazapine was discontinued from hospice and nursing would like it restarted. Thinks it helped with overnight restlessness.  Bowel movement today.    Past Medical and Surgical History reviewed in Epic today.    MEDICATIONS:    Current Outpatient Medications   Medication Sig Dispense Refill     QUEtiapine (SEROQUEL) 50 MG tablet Take 1.5 tablets (75 mg) by mouth 2 times daily 90 tablet 3     sertraline (ZOLOFT) 50 MG tablet Take 1.5 tablets (75 mg) by mouth daily 30 tablet 3     acetaminophen (ACETAMINOPHEN EXTRA STRENGTH) 500 MG tablet TAKE TWO TABLETS (1000MG) BY MOUTH TWICE DAILY AS NEEDED FOR PAIN 186 tablet 97     bisacodyl (DULCOLAX) 10 MG suppository UNWRAP AND INSERT ONE SUPPOSITORY RECTALLY EVERY 3 DAYS AND DAILY AS NEEDED FOR CONSTIPATION 12 suppository 11     hyoscyamine (LEVSIN) 0.125 MG tablet Take 1 tablet (125 mcg) by mouth every 4 hours as needed for cramping       lactulose 20 GM/30ML SOLN Take 30 mLs (20 g) by mouth daily 473 mL 11     latanoprost (XALATAN) 0.005 % ophthalmic solution INSTILL ONE DROP IN EACH EYE AT BEDTIME " "2.5 mL 97     levothyroxine (SYNTHROID/LEVOTHROID) 88 MCG tablet TAKE 1 TABLET BY MOUTH ONCE DAILY 90 tablet 97     LORazepam (ATIVAN) 0.5 MG tablet Take 1 tablet (0.5 mg) by mouth 4 times daily And once daily as needed 90 tablet 3     metoprolol tartrate (LOPRESSOR) 25 MG tablet TAKE ONE TABLET (25MG) BY MOUTH TWICE DAILY 180 tablet 97     mirtazapine (REMERON) 15 MG tablet TAKE 1 TABLET BY MOUTH AT BEDTIME  90 tablet 3     morphine 2.5 MG solu-tab Take 1 tablet (2.5 mg) by mouth every hour as needed for shortness of breath or moderate to severe pain       polyethylene glycol (MIRALAX) 17 GM/Dose powder MIX 17GM OF POWDER IN 8OZ OF WATER UNTIL COMPLETELY DISSOLVED. DRINK SOLUTION TWICE DAILY 510 g 97     prochlorperazine (COMPAZINE) 10 MG tablet Take 1 tablet (10 mg) by mouth every 6 hours as needed for nausea or vomiting       senna-docusate (SENEXON-S) 8.6-50 MG tablet TAKE TWO TABLETS BY MOUTH TWICE DAILY. DO NOT HOLD FOR LOOSE STOOL AND UPDATE PROVIDER FOR POSSIBLEBOWEL IMPACTION. 112 tablet 97     Sodium Phosphates (ENEMA) 7-19 GM/118ML ENEM ONCE WEEKLY AND DAILY AS NEEDED FOR STOOL IMPACTION 399 mL PRN     traZODone (DESYREL) 50 MG tablet TAKE 1 TABLET BY MOUTH AT BEDTIME 90 tablet 97     REVIEW OF SYSTEMS:  Unobtainable secondary to cognitive impairment.     Objective:  BP (!) 178/126   Pulse 97   Temp 97.4  F (36.3  C)   Resp 18   Ht 1.727 m (5' 8\")   Wt 52.3 kg (115 lb 3.2 oz)   SpO2 94%   BMI 17.52 kg/m    Exam:  GENERAL APPEARANCE:  Alert,  anxious, crying and upset, cachetic   ENT:  Mouth and posterior oropharynx normal, dry mucous membranes, normal hearing acuity  EYES: lids, pupils and irises normal  RESP:  respiratory effort and palpation of chest normal, lungs clear to auscultation but diminished   CV:  Palpation and auscultation of heart done , regular rate and rhythm, no murmur, rub, or gallop, trace edema, pacer  ABDOMEN: BS,  Abdominal area is rounded  and distended, slightly firm at " baseline   M/S: Broda chair   SKIN:  Inspection of skin and subcutaneous tissue-baseline to visualized areas  NEURO:   Cranial nerves 2-12 are normal tested and grossly at patient's baseline  PSYCH:  insight and judgement impaired, memory impaired     Labs:   CBC RESULTS: Recent Labs   Lab Test 01/05/23  1023 06/17/22  0934   WBC 6.9 6.3   RBC 3.51* 3.89   HGB 11.4* 12.5   HCT 35.3 39.0   * 100   MCH 32.5 32.1   MCHC 32.3 32.1   RDW 13.2 14.0    174       Last Basic Metabolic Panel:  Recent Labs   Lab Test 01/05/23  1023 06/17/22  0934    143   POTASSIUM 4.0 4.1   CHLORIDE 111* 110*   AMERICO 9.0 9.0   CO2 21 27   BUN 28 22   CR 1.13* 1.11*   * 96       Liver Function Studies -   Recent Labs   Lab Test 01/05/23  1023 06/17/22  0934   PROTTOTAL 6.7* 6.9   ALBUMIN 3.6 3.8   BILITOTAL 0.5 0.5   ALKPHOS 65 73   AST 18 20   ALT 14 16       TSH   Date Value Ref Range Status   01/05/2023 3.08 0.40 - 4.00 mU/L Final   07/28/2022 2.49 0.40 - 4.00 mU/L Final   12/23/2020 4.80 (H) 0.40 - 4.00 mU/L Final   11/25/2020 8.30 (H) 0.40 - 4.00 mU/L Final       No results found for: A1C    ASSESSMENT/PLAN:  (F03.918) Dementia with behavioral disturbance  (primary encounter diagnosis)  (F41.9) Anxiety  (R45.1) Agitation  (Z51.5) Hospice care patient  Comment: ongoing anxiety with agitation-unable to obtain updated VS today secondary to agitation.   Plan:   1. Restart Mirtazapine 15 mg po daily at HS  2. Reduce Sertraline to 75 mg po daily every morning  3. Discontinue current orders for Seroquel and begin Seroquel 75 mg po daily every afternoon and evening   4. Continue with additional scheduled and  prn medication from hospice for anxiety    Electronically signed by:  ZAFAR Bae CNP                 Sincerely,        ZAFAR Bae CNP

## 2023-02-27 NOTE — PROGRESS NOTES
Geriatrics Notification of Patient Death    Provider: ZAFAR Helms CNP  Place of death: [facility] [address]  Facility Type:  [place]    Caller: Fax from [facility]  Date of Death: 02/27/2023 at 1000    Patient was on Hospice care: Yes; please explain: [facility]  Patient was seen by [facility] Geriatrics provider: Yes; please explain: on 2/22/2023 by MIGUELANGEL Helms RN

## 2023-03-10 NOTE — DISCHARGE INSTRUCTIONS
Fortunately, your CT scan of your head does not show any major internal injury, and the rest of your tests (xrays, blood tests, and EKG) also look fine.  No medication changes are recommended at this time, and you should continue your current regimen.   36.9

## 2024-04-26 NOTE — PROGRESS NOTES
[facility]    Medicine Progress Note - Hospitalist Service       Date of Admission:  1/6/2020  Assessment & Plan   Annabel May is an 85-year-old female with severe dementia, hypertension, hypothyroidism, glaucoma, and atrial fibrillation who was brought to ER today for dementia with agitation.     Dementia with behavioral disturbances and agitation  It seems her memory issues have been worsening and she has been getting more agitated, getting difficult for family to control at the assisted living facility.  Suspected UTI may also be contributing  - PT/OT evaluation and  for disposition planning  - Scheduled Seroquel and PRN   - Psychiatry consulted to help with agitation     Abnormal urinalysis, likely urinary tract infection  Difficult to assess her symptoms.  UA does look slightly abnormal.  Urine culture growing 10-50k Gram negative rods  - Continue to follow urine culture  - Continue Ceftriaxone     Constipation  Noted on CT scan with large stool.  It is possible this could be worsening her agitation.    - Continue scheduled laxatives along with PRN for now     Hypertension  - PTA Lisinopril and metoprolol    Atrial fibrillation  - Continue metoprolol  - Continue Eliquis    Hypothyroidism  - Continue Synthroid    Anxiety, depression  - Continue trazodone    Glaucoma  We will hold off on her eyedrops while under observation status     Diet: Regular Diet Adult    DVT Prophylaxis: Eliquis  Garcia Catheter: not present  Code Status: DNR/DNI      Disposition Plan   Expected discharge: 1-2 days once bed found.  Agitation will need to be improved   Entered: Jesus Meyers DO 01/07/2020, 12:43 PM       The patient's care was discussed with the Bedside Nurse, Care Coordinator/ and Patient.    Jesus Meyers DO  Hospitalist Service  [facility]    ______________________________________________________________________    Interval History   Patient seen and  Procedure: Ear Lavage     Irrigated right ear(s) using 400 ml of water and peroxide. Significant observations if any: none. Removal took a short amount of time, approximately 3 minutes, and was not difficult at all.  Patient tolerated the procedure well.     examined.  Patient is still agitated. It appears last night she was combative and yelling.  She is obviously confused on my evaluation and has no idea why she is in the hospital.  No pain or difficulty breathing though     Data reviewed today: I reviewed all medications, new labs and imaging results over the last 24 hours. I personally reviewed no images or EKG's today.    Physical Exam   Vital Signs: Temp: 97.4  F (36.3  C) Temp src: Oral BP: 120/63 Pulse: 64   Resp: 16 SpO2: 97 % O2 Device: None (Room air)    Weight: 0 lbs 0 oz  General Appearance: Anxious.  Pacing.  Agitated  Respiratory: Clear to auscultation.  No respiratory distress   Cardiovascular: RRR.  No obvious murmurs  GI: Non-distended.  Non-tender  Skin: No rashes.  No cyanosis  Other: No edema.  No calf tenderness.  Oriented to person only      Data   Recent Labs   Lab 01/06/20  1530   WBC 7.9   HGB 14.7   MCV 94         POTASSIUM 3.8   CHLORIDE 111*   CO2 23   BUN 22   CR 1.02   ANIONGAP 7   AMERICO 9.9   GLC 93   ALBUMIN 4.4   PROTTOTAL 7.7   BILITOTAL 0.9   ALKPHOS 66   ALT 29   AST 31   LIPASE 244     Recent Results (from the past 24 hour(s))   CT Abdomen Pelvis w Contrast    Narrative    CT ABDOMEN PELVIS WITH CONTRAST   1/6/2020 4:54 PM     HISTORY: Abdominal pain left lower quadrant.      TECHNIQUE: CT abdomen and pelvis with 65 mL Isovue-370 IV. Radiation  dose for this scan was reduced using automated exposure control,  adjustment of the mA and/or kV according to patient size, or iterative  reconstruction technique.    COMPARISON: None available    FINDINGS:   Lower chest: Right basilar atelectasis.    Abdomen/pelvis: No suspicious focal hepatic lesion. The gallbladder is  unremarkable. No splenomegaly. No adrenal nodules. No pancreatic  ductal dilatation or solid pancreatic mass. No radiodense kidney  stones or hydronephrosis. Right renal cyst.    No abnormally dilated bowel loops. No significant free fluid in the  abdomen or  pelvis. No free peritoneal or portal venous gas. Large  amount of stool throughout the colon. The uterus is not visualized,  likely surgically absent. Scattered predominantly aortobiiliac  atherosclerotic vascular calcification.    Bones and soft tissues: Multilevel degenerative changes of the  visualized spine. Sclerotic foci in the spine, for example in the  posterior aspect of superior endplate of L5 (series 4 image 94),  indeterminate, could represent bony island, although direct comparison  between different modalities is difficult, but appear similar in size  as compared to 6/17/2012 MRI.      Impression    IMPRESSION:  1. No acute pathology in the abdomen or pelvis.  2. Large amount of stool throughout the colon, nonspecific, can be  seen with constipation.    ALEXANDREA SCOTT MD   CT Head w/o Contrast    Narrative    CT OF THE HEAD WITHOUT CONTRAST 1/6/2020 4:55 PM     COMPARISON: None.    HISTORY: Possible head injury, MS change    TECHNIQUE: 5 mm thick axial CT images of the head were acquired  without IV contrast material.    FINDINGS: There is moderate diffuse cerebral volume loss. There are  subtle patchy areas of decreased density in the cerebral white matter  bilaterally that are consistent with sequela of chronic small vessel  ischemic disease. There is a small area of chronic encephalomalacia at  the whitney-opercular aspect of the right frontal lobe and in the  anterior aspect of the right insular cortex consistent with a chronic  ischemic infarct.    The ventricles and basal cisterns are within normal limits in  configuration given the degree of cerebral volume loss. There is no  midline shift. There are no extra-axial fluid collections.    No intracranial hemorrhage, mass or recent infarct.    The visualized paranasal sinuses are well-aerated. There is no  mastoiditis. There are no fractures of the visualized bones.      Impression    IMPRESSION: Diffuse cerebral volume loss and cerebral white  matter  changes consistent with chronic small vessel ischemic disease. No  evidence for acute intracranial pathology. Probable chronic ischemic  infarct involving the right frontal lobe and right insular cortex.      Radiation dose for this scan was reduced using automated exposure  control, adjustment of the mA and/or kV according to patient size, or  iterative reconstruction technique    DOMONIQUE DUBOIS MD   XR Hand Right G/E 3 Views    Narrative    XR RIGHT HAND THREE OR MORE VIEWS 1/6/2020 4:56 PM     HISTORY: Hand pain after a possible fall.    COMPARISON: None.      Impression    IMPRESSION: No acute-appearing bony abnormality. Generalized bony  demineralization. Advanced osteoarthritis at the first carpometacarpal  joint with milder osteoarthritis at the triscaphe joint.  Osteoarthritis at multiple metacarpophalangeal and interphalangeal  joints is also seen. The mid diaphysis of the proximal phalanx of the  fourth and fifth fingers are obscured by overlying metallic rings.    JETT DEJESUS MD